# Patient Record
Sex: FEMALE | Race: BLACK OR AFRICAN AMERICAN | NOT HISPANIC OR LATINO
[De-identification: names, ages, dates, MRNs, and addresses within clinical notes are randomized per-mention and may not be internally consistent; named-entity substitution may affect disease eponyms.]

---

## 2017-01-02 ENCOUNTER — RESULT REVIEW (OUTPATIENT)
Age: 32
End: 2017-01-02

## 2017-01-03 ENCOUNTER — APPOINTMENT (OUTPATIENT)
Dept: HEMATOLOGY ONCOLOGY | Facility: CLINIC | Age: 32
End: 2017-01-03

## 2017-01-03 VITALS
DIASTOLIC BLOOD PRESSURE: 75 MMHG | SYSTOLIC BLOOD PRESSURE: 119 MMHG | WEIGHT: 187.39 LBS | BODY MASS INDEX: 33.62 KG/M2 | HEIGHT: 62.6 IN | OXYGEN SATURATION: 100 % | RESPIRATION RATE: 16 BRPM | TEMPERATURE: 98.3 F | HEART RATE: 98 BPM

## 2017-01-03 DIAGNOSIS — D47.3 ESSENTIAL (HEMORRHAGIC) THROMBOCYTHEMIA: ICD-10-CM

## 2017-01-25 ENCOUNTER — APPOINTMENT (OUTPATIENT)
Dept: OBGYN | Facility: CLINIC | Age: 32
End: 2017-01-25

## 2017-01-25 ENCOUNTER — APPOINTMENT (OUTPATIENT)
Dept: ANTEPARTUM | Facility: CLINIC | Age: 32
End: 2017-01-25

## 2017-01-25 ENCOUNTER — LABORATORY RESULT (OUTPATIENT)
Age: 32
End: 2017-01-25

## 2017-01-25 ENCOUNTER — ASOB RESULT (OUTPATIENT)
Age: 32
End: 2017-01-25

## 2017-01-25 VITALS
DIASTOLIC BLOOD PRESSURE: 70 MMHG | BODY MASS INDEX: 34.6 KG/M2 | SYSTOLIC BLOOD PRESSURE: 120 MMHG | HEIGHT: 62 IN | WEIGHT: 188 LBS

## 2017-02-01 ENCOUNTER — CHART COPY (OUTPATIENT)
Age: 32
End: 2017-02-01

## 2017-02-01 LAB — BACTERIA UR CULT: ABNORMAL

## 2017-02-02 ENCOUNTER — ASOB RESULT (OUTPATIENT)
Age: 32
End: 2017-02-02

## 2017-02-02 ENCOUNTER — APPOINTMENT (OUTPATIENT)
Dept: ANTEPARTUM | Facility: CLINIC | Age: 32
End: 2017-02-02

## 2017-02-08 ENCOUNTER — OUTPATIENT (OUTPATIENT)
Dept: OUTPATIENT SERVICES | Facility: HOSPITAL | Age: 32
LOS: 1 days | Discharge: HOME | End: 2017-02-08

## 2017-02-08 DIAGNOSIS — K08.499 PARTIAL LOSS OF TEETH DUE TO OTHER SPECIFIED CAUSE, UNSPECIFIED CLASS: Chronic | ICD-10-CM

## 2017-02-08 DIAGNOSIS — D47.3 ESSENTIAL (HEMORRHAGIC) THROMBOCYTHEMIA: ICD-10-CM

## 2017-02-09 ENCOUNTER — RESULT REVIEW (OUTPATIENT)
Age: 32
End: 2017-02-09

## 2017-02-10 ENCOUNTER — APPOINTMENT (OUTPATIENT)
Dept: HEMATOLOGY ONCOLOGY | Facility: CLINIC | Age: 32
End: 2017-02-10

## 2017-02-10 VITALS
TEMPERATURE: 98.3 F | SYSTOLIC BLOOD PRESSURE: 120 MMHG | BODY MASS INDEX: 34.88 KG/M2 | OXYGEN SATURATION: 100 % | RESPIRATION RATE: 16 BRPM | HEART RATE: 98 BPM | DIASTOLIC BLOOD PRESSURE: 76 MMHG | WEIGHT: 190.7 LBS

## 2017-02-10 LAB
BASOPHILS # BLD AUTO: 0.1 K/UL — SIGNIFICANT CHANGE UP (ref 0–0.2)
BASOPHILS NFR BLD AUTO: 0.7 % — SIGNIFICANT CHANGE UP (ref 0–2)
EOSINOPHIL # BLD AUTO: 0.1 K/UL — SIGNIFICANT CHANGE UP (ref 0–0.5)
EOSINOPHIL NFR BLD AUTO: 0.7 % — SIGNIFICANT CHANGE UP (ref 0–6)
HCT VFR BLD CALC: 32.5 % — LOW (ref 34.5–45)
HGB BLD-MCNC: 10.3 G/DL — LOW (ref 11.5–15.5)
LYMPHOCYTES # BLD AUTO: 28 % — SIGNIFICANT CHANGE UP (ref 13–44)
LYMPHOCYTES # BLD AUTO: 3.6 K/UL — HIGH (ref 1–3.3)
MCHC RBC-ENTMCNC: 21.9 PG — LOW (ref 27–34)
MCHC RBC-ENTMCNC: 31.7 G/DL — LOW (ref 32–36)
MCV RBC AUTO: 69.1 FL — LOW (ref 80–100)
MONOCYTES # BLD AUTO: 1.4 K/UL — HIGH (ref 0–0.9)
MONOCYTES NFR BLD AUTO: 10.8 % — SIGNIFICANT CHANGE UP (ref 2–14)
NEUTROPHILS # BLD AUTO: 7.7 K/UL — HIGH (ref 1.8–7.4)
NEUTROPHILS NFR BLD AUTO: 59.8 % — SIGNIFICANT CHANGE UP (ref 43–77)
PLATELET # BLD AUTO: 333 K/UL — SIGNIFICANT CHANGE UP (ref 150–400)
RBC # BLD: 4.71 M/UL — SIGNIFICANT CHANGE UP (ref 3.8–5.2)
RBC # FLD: 14.1 % — SIGNIFICANT CHANGE UP (ref 10.3–14.5)
WBC # BLD: 12.9 K/UL — HIGH (ref 3.8–10.5)
WBC # FLD AUTO: 12.9 K/UL — HIGH (ref 3.8–10.5)

## 2017-02-15 ENCOUNTER — APPOINTMENT (OUTPATIENT)
Dept: ENDOCRINOLOGY | Facility: CLINIC | Age: 32
End: 2017-02-15

## 2017-02-15 ENCOUNTER — LABORATORY RESULT (OUTPATIENT)
Age: 32
End: 2017-02-15

## 2017-02-15 ENCOUNTER — MEDICATION RENEWAL (OUTPATIENT)
Age: 32
End: 2017-02-15

## 2017-02-15 VITALS
HEIGHT: 62 IN | SYSTOLIC BLOOD PRESSURE: 102 MMHG | DIASTOLIC BLOOD PRESSURE: 64 MMHG | BODY MASS INDEX: 35 KG/M2 | WEIGHT: 190.2 LBS | OXYGEN SATURATION: 98 % | HEART RATE: 118 BPM

## 2017-02-15 PROBLEM — Z34.93 ENCOUNTER FOR SUPERVISION OF LOW-RISK PREGNANCY IN THIRD TRIMESTER: Status: RESOLVED | Noted: 2017-01-25 | Resolved: 2017-02-15

## 2017-02-15 LAB
FERRITIN SERPL-MCNC: 12.8 NG/ML
IRON SATN MFR SERPL: 11 %
IRON SERPL-MCNC: 48 UG/DL
TIBC SERPL-MCNC: 441 UG/DL
UIBC SERPL-MCNC: 393 UG/DL

## 2017-02-16 ENCOUNTER — APPOINTMENT (OUTPATIENT)
Dept: OBGYN | Facility: CLINIC | Age: 32
End: 2017-02-16

## 2017-02-16 VITALS
BODY MASS INDEX: 34.96 KG/M2 | SYSTOLIC BLOOD PRESSURE: 117 MMHG | HEIGHT: 62 IN | DIASTOLIC BLOOD PRESSURE: 71 MMHG | WEIGHT: 190 LBS

## 2017-02-16 DIAGNOSIS — Z34.93 ENCOUNTER FOR SUPERVISION OF NORMAL PREGNANCY, UNSPECIFIED, THIRD TRIMESTER: ICD-10-CM

## 2017-02-16 DIAGNOSIS — O20.0 THREATENED ABORTION: ICD-10-CM

## 2017-02-17 LAB
BASOPHILS # BLD AUTO: 0 K/UL
BASOPHILS NFR BLD AUTO: 0 %
EOSINOPHIL # BLD AUTO: 0.12 K/UL
EOSINOPHIL NFR BLD AUTO: 0.9 %
GLUCOSE 1H P 50 G GLC PO SERPL-MCNC: 98 MG/DL
HCT VFR BLD CALC: 30.7 %
HGB BLD-MCNC: 9.8 G/DL
HIV1+2 AB SPEC QL IA.RAPID: NONREACTIVE
HIVABINT: NORMAL
LYMPHOCYTES # BLD AUTO: 1.53 K/UL
LYMPHOCYTES NFR BLD AUTO: 11.8 %
MAN DIFF?: NORMAL
MCHC RBC-ENTMCNC: 21.4 PG
MCHC RBC-ENTMCNC: 31.9 GM/DL
MCV RBC AUTO: 67.2 FL
MONOCYTES # BLD AUTO: 1.06 K/UL
MONOCYTES NFR BLD AUTO: 8.2 %
NEUTROPHILS # BLD AUTO: 9.92 K/UL
NEUTROPHILS NFR BLD AUTO: 75.5 %
PLATELET # BLD AUTO: 360 K/UL
RBC # BLD: 4.57 M/UL
RBC # FLD: 15.6 %
WBC # FLD AUTO: 12.98 K/UL

## 2017-02-23 ENCOUNTER — OUTPATIENT (OUTPATIENT)
Dept: OUTPATIENT SERVICES | Facility: HOSPITAL | Age: 32
LOS: 1 days | End: 2017-02-23
Payer: COMMERCIAL

## 2017-02-23 ENCOUNTER — EMERGENCY (EMERGENCY)
Facility: HOSPITAL | Age: 32
LOS: 1 days | Discharge: HOME | End: 2017-02-23
Attending: EMERGENCY MEDICINE | Admitting: EMERGENCY MEDICINE
Payer: COMMERCIAL

## 2017-02-23 VITALS
DIASTOLIC BLOOD PRESSURE: 80 MMHG | TEMPERATURE: 98 F | RESPIRATION RATE: 20 BRPM | SYSTOLIC BLOOD PRESSURE: 130 MMHG | HEART RATE: 109 BPM | OXYGEN SATURATION: 98 %

## 2017-02-23 VITALS
HEART RATE: 96 BPM | OXYGEN SATURATION: 99 % | SYSTOLIC BLOOD PRESSURE: 114 MMHG | DIASTOLIC BLOOD PRESSURE: 67 MMHG | TEMPERATURE: 99 F | RESPIRATION RATE: 18 BRPM

## 2017-02-23 DIAGNOSIS — K08.499 PARTIAL LOSS OF TEETH DUE TO OTHER SPECIFIED CAUSE, UNSPECIFIED CLASS: Chronic | ICD-10-CM

## 2017-02-23 DIAGNOSIS — O47.1 FALSE LABOR AT OR AFTER 37 COMPLETED WEEKS OF GESTATION: ICD-10-CM

## 2017-02-23 DIAGNOSIS — O12.02 GESTATIONAL EDEMA, SECOND TRIMESTER: ICD-10-CM

## 2017-02-23 LAB
ALBUMIN SERPL ELPH-MCNC: 3.7 G/DL — SIGNIFICANT CHANGE UP (ref 3.3–5)
ALP SERPL-CCNC: 62 U/L — SIGNIFICANT CHANGE UP (ref 40–120)
ALT FLD-CCNC: 24 U/L RC — SIGNIFICANT CHANGE UP (ref 10–45)
ANION GAP SERPL CALC-SCNC: 14 MMOL/L — SIGNIFICANT CHANGE UP (ref 5–17)
APPEARANCE UR: ABNORMAL
APTT BLD: 28.3 SEC — SIGNIFICANT CHANGE UP (ref 27.5–37.4)
AST SERPL-CCNC: 44 U/L — HIGH (ref 10–40)
BACTERIA # UR AUTO: ABNORMAL /HPF
BASOPHILS # BLD AUTO: 0 K/UL — SIGNIFICANT CHANGE UP (ref 0–0.2)
BASOPHILS NFR BLD AUTO: 0.4 % — SIGNIFICANT CHANGE UP (ref 0–2)
BILIRUB SERPL-MCNC: 0.3 MG/DL — SIGNIFICANT CHANGE UP (ref 0.2–1.2)
BILIRUB UR-MCNC: NEGATIVE — SIGNIFICANT CHANGE UP
BUN SERPL-MCNC: 5 MG/DL — LOW (ref 7–23)
CALCIUM SERPL-MCNC: 9.1 MG/DL — SIGNIFICANT CHANGE UP (ref 8.4–10.5)
CHLORIDE SERPL-SCNC: 101 MMOL/L — SIGNIFICANT CHANGE UP (ref 96–108)
CO2 SERPL-SCNC: 23 MMOL/L — SIGNIFICANT CHANGE UP (ref 22–31)
COLOR SPEC: YELLOW — SIGNIFICANT CHANGE UP
CREAT SERPL-MCNC: 0.49 MG/DL — LOW (ref 0.5–1.3)
DIFF PNL FLD: NEGATIVE — SIGNIFICANT CHANGE UP
EOSINOPHIL # BLD AUTO: 0.1 K/UL — SIGNIFICANT CHANGE UP (ref 0–0.5)
EOSINOPHIL NFR BLD AUTO: 0.5 % — SIGNIFICANT CHANGE UP (ref 0–6)
EPI CELLS # UR: ABNORMAL /HPF
FSP PPP-MCNC: < 5 — SIGNIFICANT CHANGE UP
GLUCOSE SERPL-MCNC: 69 MG/DL — LOW (ref 70–99)
GLUCOSE UR QL: NEGATIVE — SIGNIFICANT CHANGE UP
HCT VFR BLD CALC: 32.4 % — LOW (ref 34.5–45)
HGB BLD-MCNC: 10.4 G/DL — LOW (ref 11.5–15.5)
INR BLD: 1.09 RATIO — SIGNIFICANT CHANGE UP (ref 0.88–1.16)
KETONES UR-MCNC: NEGATIVE — SIGNIFICANT CHANGE UP
LDH SERPL L TO P-CCNC: 302 U/L — HIGH (ref 50–242)
LEUKOCYTE ESTERASE UR-ACNC: NEGATIVE — SIGNIFICANT CHANGE UP
LYMPHOCYTES # BLD AUTO: 22.3 % — SIGNIFICANT CHANGE UP (ref 13–44)
LYMPHOCYTES # BLD AUTO: 3 K/UL — SIGNIFICANT CHANGE UP (ref 1–3.3)
MCHC RBC-ENTMCNC: 22.1 PG — LOW (ref 27–34)
MCHC RBC-ENTMCNC: 32 GM/DL — SIGNIFICANT CHANGE UP (ref 32–36)
MCV RBC AUTO: 68.9 FL — LOW (ref 80–100)
MONOCYTES # BLD AUTO: 1.2 K/UL — HIGH (ref 0–0.9)
MONOCYTES NFR BLD AUTO: 9 % — SIGNIFICANT CHANGE UP (ref 2–14)
NEUTROPHILS # BLD AUTO: 9.1 K/UL — HIGH (ref 1.8–7.4)
NEUTROPHILS NFR BLD AUTO: 67.8 % — SIGNIFICANT CHANGE UP (ref 43–77)
NITRITE UR-MCNC: NEGATIVE — SIGNIFICANT CHANGE UP
PH UR: 6.5 — SIGNIFICANT CHANGE UP (ref 4.8–8)
PLATELET # BLD AUTO: 338 K/UL — SIGNIFICANT CHANGE UP (ref 150–400)
POTASSIUM SERPL-MCNC: 4.6 MMOL/L — SIGNIFICANT CHANGE UP (ref 3.5–5.3)
POTASSIUM SERPL-SCNC: 4.6 MMOL/L — SIGNIFICANT CHANGE UP (ref 3.5–5.3)
PROT SERPL-MCNC: 7.2 G/DL — SIGNIFICANT CHANGE UP (ref 6–8.3)
PROT UR-MCNC: SIGNIFICANT CHANGE UP
PROTHROM AB SERPL-ACNC: 11.8 SEC — SIGNIFICANT CHANGE UP (ref 10–13.1)
RBC # BLD: 4.7 M/UL — SIGNIFICANT CHANGE UP (ref 3.8–5.2)
RBC # FLD: 15 % — HIGH (ref 10.3–14.5)
RBC CASTS # UR COMP ASSIST: SIGNIFICANT CHANGE UP /HPF (ref 0–2)
SODIUM SERPL-SCNC: 138 MMOL/L — SIGNIFICANT CHANGE UP (ref 135–145)
SP GR SPEC: 1.01 — SIGNIFICANT CHANGE UP (ref 1.01–1.02)
URATE SERPL-MCNC: 3.7 MG/DL — SIGNIFICANT CHANGE UP (ref 2.5–7)
UROBILINOGEN FLD QL: NEGATIVE — SIGNIFICANT CHANGE UP
WBC # BLD: 13.4 K/UL — HIGH (ref 3.8–10.5)
WBC # FLD AUTO: 13.4 K/UL — HIGH (ref 3.8–10.5)
WBC UR QL: SIGNIFICANT CHANGE UP /HPF (ref 0–5)

## 2017-02-23 PROCEDURE — 80053 COMPREHEN METABOLIC PANEL: CPT

## 2017-02-23 PROCEDURE — 85384 FIBRINOGEN ACTIVITY: CPT

## 2017-02-23 PROCEDURE — 83615 LACTATE (LD) (LDH) ENZYME: CPT

## 2017-02-23 PROCEDURE — 59025 FETAL NON-STRESS TEST: CPT | Mod: 26

## 2017-02-23 PROCEDURE — 85610 PROTHROMBIN TIME: CPT

## 2017-02-23 PROCEDURE — 85027 COMPLETE CBC AUTOMATED: CPT

## 2017-02-23 PROCEDURE — 85362 FIBRIN DEGRADATION PRODUCTS: CPT

## 2017-02-23 PROCEDURE — 84550 ASSAY OF BLOOD/URIC ACID: CPT

## 2017-02-23 PROCEDURE — 99283 EMERGENCY DEPT VISIT LOW MDM: CPT

## 2017-02-23 PROCEDURE — 81001 URINALYSIS AUTO W/SCOPE: CPT

## 2017-02-23 PROCEDURE — 99284 EMERGENCY DEPT VISIT MOD MDM: CPT

## 2017-02-23 PROCEDURE — 85730 THROMBOPLASTIN TIME PARTIAL: CPT

## 2017-02-23 PROCEDURE — G0463: CPT

## 2017-02-23 NOTE — ED ADULT NURSE NOTE - ADDITIONAL PRINTED INSTRUCTIONS GIVEN
Report given to labor and delivery RN Arcelia. One of the nurses in labor and delivery siaid to leave the IV in case they need it.

## 2017-02-23 NOTE — ED PROVIDER NOTE - MUSCULOSKELETAL, MLM
Spine appears normal, range of motion is not limited, no muscle or joint tenderness. No pitting edema noted.

## 2017-02-23 NOTE — ED PROVIDER NOTE - OBJECTIVE STATEMENT
31 year old pregnant female (, 25 weeks) presents with b/l pedal edema since last night. Pt called her OB today and was advised to present to the ED. Endorses HA yesterday and Monday described as 6/10 in severity. Denies any HA currently. Denies abdominal pain, vaginal bleeding, CP, palpitations, SOB. No recent travel or extended periods of immobilizations. Reports family hx of HTN.    OB: Dr. Mcguire       OB: Dr. Mcguire

## 2017-02-23 NOTE — ED PROVIDER NOTE - NS ED MD SCRIBE ATTENDING SCRIBE SECTIONS
INTAKE ASSESSMENT/SCREENINGS/PAST MEDICAL/SURGICAL/SOCIAL HISTORY/PHYSICAL EXAM/HIV/VITAL SIGNS( Pullset)/HISTORY OF PRESENT ILLNESS/REVIEW OF SYSTEMS

## 2017-02-23 NOTE — ED ADULT NURSE NOTE - OBJECTIVE STATEMENT
Received pt alert and orientedX4. No distress. Breathing easy and non labored. Pt c/o swelling non pitting edema of lower extremities. no other complaints. Pt stated no other complications from pregnancy which is the first pregnancy for pt. Mildly anxious. Emotional support offered.  at bedside.

## 2017-02-23 NOTE — ED PROVIDER NOTE - PROGRESS NOTE DETAILS
GYN resident aware, will speak w GYN attendnig D/w GYN resident, will monitor upstairs in L&D, will f/u on labs and VS for possible preeclampsia

## 2017-03-01 LAB
CREAT 24H UR-MCNC: 1.2 G/24 H
CREAT 24H UR-MCNC: 1.2 G/24 H
CREAT ?TM UR-MCNC: 83 MG/DL
CREAT ?TM UR-MCNC: 83 MG/DL
PROT 24H UR-MRATE: 7 MG/DL
PROT ?TM UR-MCNC: 24 HR
PROT UR-MCNC: 105 MG/24 H
SPECIMEN VOL 24H UR: 1500 ML

## 2017-03-15 ENCOUNTER — APPOINTMENT (OUTPATIENT)
Dept: ANTEPARTUM | Facility: CLINIC | Age: 32
End: 2017-03-15

## 2017-03-15 ENCOUNTER — ASOB RESULT (OUTPATIENT)
Age: 32
End: 2017-03-15

## 2017-03-15 ENCOUNTER — APPOINTMENT (OUTPATIENT)
Dept: OBGYN | Facility: CLINIC | Age: 32
End: 2017-03-15

## 2017-03-15 ENCOUNTER — LABORATORY RESULT (OUTPATIENT)
Age: 32
End: 2017-03-15

## 2017-03-15 VITALS
SYSTOLIC BLOOD PRESSURE: 138 MMHG | WEIGHT: 191 LBS | HEIGHT: 62 IN | DIASTOLIC BLOOD PRESSURE: 81 MMHG | BODY MASS INDEX: 35.15 KG/M2

## 2017-03-16 LAB
ALBUMIN SERPL ELPH-MCNC: 3.4 G/DL
ALP BLD-CCNC: 81 U/L
ALT SERPL-CCNC: 24 U/L
ANION GAP SERPL CALC-SCNC: 16 MMOL/L
AST SERPL-CCNC: 21 U/L
BASOPHILS # BLD AUTO: 0 K/UL
BASOPHILS NFR BLD AUTO: 0 %
BILIRUB SERPL-MCNC: 0.2 MG/DL
BUN SERPL-MCNC: 3 MG/DL
CALCIUM SERPL-MCNC: 9.9 MG/DL
CHLORIDE SERPL-SCNC: 104 MMOL/L
CO2 SERPL-SCNC: 19 MMOL/L
CREAT SERPL-MCNC: 0.42 MG/DL
CREAT SPEC-SCNC: 63 MG/DL
CREAT/PROT UR: 0.1 RATIO
EOSINOPHIL # BLD AUTO: 0.11 K/UL
EOSINOPHIL NFR BLD AUTO: 0.9 %
GLUCOSE SERPL-MCNC: 92 MG/DL
HCT VFR BLD CALC: 31.7 %
HGB BLD-MCNC: 10.1 G/DL
LYMPHOCYTES # BLD AUTO: 1.54 K/UL
LYMPHOCYTES NFR BLD AUTO: 12.6 %
MAN DIFF?: NORMAL
MCHC RBC-ENTMCNC: 21.5 PG
MCHC RBC-ENTMCNC: 31.9 GM/DL
MCV RBC AUTO: 67.4 FL
MONOCYTES # BLD AUTO: 0.88 K/UL
MONOCYTES NFR BLD AUTO: 7.2 %
NEUTROPHILS # BLD AUTO: 9.34 K/UL
NEUTROPHILS NFR BLD AUTO: 76.6 %
PLATELET # BLD AUTO: 377 K/UL
POTASSIUM SERPL-SCNC: 4.3 MMOL/L
PROT SERPL-MCNC: 6.3 G/DL
PROT UR-MCNC: 8 MG/DL
RBC # BLD: 4.7 M/UL
RBC # FLD: 15.6 %
SODIUM SERPL-SCNC: 139 MMOL/L
WBC # FLD AUTO: 12.19 K/UL

## 2017-03-17 LAB — BACTERIA UR CULT: NORMAL

## 2017-04-05 ENCOUNTER — APPOINTMENT (OUTPATIENT)
Dept: OBGYN | Facility: CLINIC | Age: 32
End: 2017-04-05

## 2017-04-11 ENCOUNTER — APPOINTMENT (OUTPATIENT)
Dept: OBGYN | Facility: CLINIC | Age: 32
End: 2017-04-11

## 2017-04-11 VITALS
DIASTOLIC BLOOD PRESSURE: 81 MMHG | SYSTOLIC BLOOD PRESSURE: 135 MMHG | HEIGHT: 62 IN | WEIGHT: 191 LBS | BODY MASS INDEX: 35.15 KG/M2

## 2017-04-11 RX ORDER — NITROFURANTOIN MACROCRYSTALS 100 MG/1
100 CAPSULE ORAL
Qty: 10 | Refills: 0 | Status: COMPLETED | COMMUNITY
Start: 2017-02-01 | End: 2017-04-11

## 2017-04-26 ENCOUNTER — APPOINTMENT (OUTPATIENT)
Dept: ANTEPARTUM | Facility: CLINIC | Age: 32
End: 2017-04-26

## 2017-04-26 ENCOUNTER — ASOB RESULT (OUTPATIENT)
Age: 32
End: 2017-04-26

## 2017-04-26 ENCOUNTER — APPOINTMENT (OUTPATIENT)
Dept: OBGYN | Facility: CLINIC | Age: 32
End: 2017-04-26

## 2017-04-26 VITALS
DIASTOLIC BLOOD PRESSURE: 75 MMHG | BODY MASS INDEX: 35.7 KG/M2 | HEIGHT: 62 IN | SYSTOLIC BLOOD PRESSURE: 125 MMHG | WEIGHT: 194 LBS

## 2017-05-10 ENCOUNTER — APPOINTMENT (OUTPATIENT)
Dept: OBGYN | Facility: CLINIC | Age: 32
End: 2017-05-10

## 2017-05-10 VITALS
BODY MASS INDEX: 35.51 KG/M2 | SYSTOLIC BLOOD PRESSURE: 112 MMHG | HEIGHT: 62 IN | DIASTOLIC BLOOD PRESSURE: 75 MMHG | WEIGHT: 193 LBS

## 2017-05-11 LAB
GP B STREP DNA SPEC QL NAA+PROBE: NORMAL
GP B STREP DNA SPEC QL NAA+PROBE: NOT DETECTED
SOURCE GBS: NORMAL

## 2017-05-17 PROBLEM — Z34.02 ENCOUNTER FOR SUPERVISION OF NORMAL FIRST PREGNANCY IN SECOND TRIMESTER: Status: RESOLVED | Noted: 2017-01-25 | Resolved: 2017-05-17

## 2017-05-18 ENCOUNTER — APPOINTMENT (OUTPATIENT)
Dept: ANTEPARTUM | Facility: CLINIC | Age: 32
End: 2017-05-18

## 2017-05-18 ENCOUNTER — APPOINTMENT (OUTPATIENT)
Dept: OBGYN | Facility: CLINIC | Age: 32
End: 2017-05-18

## 2017-05-18 ENCOUNTER — ASOB RESULT (OUTPATIENT)
Age: 32
End: 2017-05-18

## 2017-05-18 VITALS — WEIGHT: 191 LBS | DIASTOLIC BLOOD PRESSURE: 80 MMHG | SYSTOLIC BLOOD PRESSURE: 124 MMHG | BODY MASS INDEX: 34.93 KG/M2

## 2017-05-18 DIAGNOSIS — Z34.02 ENCOUNTER FOR SUPERVISION OF NORMAL FIRST PREGNANCY, SECOND TRIMESTER: ICD-10-CM

## 2017-05-18 DIAGNOSIS — Z34.01 ENCOUNTER FOR SUPERVISION OF NORMAL FIRST PREGNANCY, FIRST TRIMESTER: ICD-10-CM

## 2017-05-25 ENCOUNTER — APPOINTMENT (OUTPATIENT)
Dept: OBGYN | Facility: CLINIC | Age: 32
End: 2017-05-25

## 2017-05-25 VITALS
WEIGHT: 192 LBS | BODY MASS INDEX: 35.33 KG/M2 | DIASTOLIC BLOOD PRESSURE: 72 MMHG | HEIGHT: 62 IN | SYSTOLIC BLOOD PRESSURE: 130 MMHG

## 2017-06-01 ENCOUNTER — APPOINTMENT (OUTPATIENT)
Dept: OBGYN | Facility: CLINIC | Age: 32
End: 2017-06-01

## 2017-06-01 VITALS
HEIGHT: 62 IN | BODY MASS INDEX: 36.32 KG/M2 | WEIGHT: 197.38 LBS | DIASTOLIC BLOOD PRESSURE: 70 MMHG | SYSTOLIC BLOOD PRESSURE: 120 MMHG

## 2017-06-08 ENCOUNTER — INPATIENT (INPATIENT)
Facility: HOSPITAL | Age: 32
LOS: 2 days | Discharge: HOME | End: 2017-06-11
Attending: OBSTETRICS & GYNECOLOGY | Admitting: OBSTETRICS & GYNECOLOGY
Payer: COMMERCIAL

## 2017-06-08 ENCOUNTER — RESULT REVIEW (OUTPATIENT)
Age: 32
End: 2017-06-08

## 2017-06-08 ENCOUNTER — APPOINTMENT (OUTPATIENT)
Dept: ANTEPARTUM | Facility: CLINIC | Age: 32
End: 2017-06-08

## 2017-06-08 ENCOUNTER — TRANSCRIPTION ENCOUNTER (OUTPATIENT)
Age: 32
End: 2017-06-08

## 2017-06-08 ENCOUNTER — ASOB RESULT (OUTPATIENT)
Age: 32
End: 2017-06-08

## 2017-06-08 ENCOUNTER — APPOINTMENT (OUTPATIENT)
Dept: OBGYN | Facility: CLINIC | Age: 32
End: 2017-06-08

## 2017-06-08 VITALS
SYSTOLIC BLOOD PRESSURE: 125 MMHG | RESPIRATION RATE: 18 BRPM | DIASTOLIC BLOOD PRESSURE: 82 MMHG | TEMPERATURE: 99 F | OXYGEN SATURATION: 92 % | HEART RATE: 108 BPM

## 2017-06-08 VITALS — BODY MASS INDEX: 35.67 KG/M2 | DIASTOLIC BLOOD PRESSURE: 60 MMHG | WEIGHT: 195 LBS | SYSTOLIC BLOOD PRESSURE: 120 MMHG

## 2017-06-08 DIAGNOSIS — Z3A.00 WEEKS OF GESTATION OF PREGNANCY NOT SPECIFIED: ICD-10-CM

## 2017-06-08 DIAGNOSIS — O47.1 FALSE LABOR AT OR AFTER 37 COMPLETED WEEKS OF GESTATION: ICD-10-CM

## 2017-06-08 DIAGNOSIS — O26.899 OTHER SPECIFIED PREGNANCY RELATED CONDITIONS, UNSPECIFIED TRIMESTER: ICD-10-CM

## 2017-06-08 DIAGNOSIS — K08.499 PARTIAL LOSS OF TEETH DUE TO OTHER SPECIFIED CAUSE, UNSPECIFIED CLASS: Chronic | ICD-10-CM

## 2017-06-08 DIAGNOSIS — D56.3 THALASSEMIA MINOR: ICD-10-CM

## 2017-06-08 DIAGNOSIS — Z34.80 ENCOUNTER FOR SUPERVISION OF OTHER NORMAL PREGNANCY, UNSPECIFIED TRIMESTER: ICD-10-CM

## 2017-06-08 DIAGNOSIS — R76.12 NONSPECIFIC REACTION TO CELL MEDIATED IMMUNITY MEASUREMENT OF GAMMA INTERFERON ANTIGEN RESPONSE W/OUT ACTIVE TUBERCULOSIS: ICD-10-CM

## 2017-06-08 LAB
BASOPHILS # BLD AUTO: 0 K/UL — SIGNIFICANT CHANGE UP (ref 0–0.2)
BASOPHILS NFR BLD AUTO: 0.5 % — SIGNIFICANT CHANGE UP (ref 0–2)
BLD GP AB SCN SERPL QL: NEGATIVE — SIGNIFICANT CHANGE UP
EOSINOPHIL # BLD AUTO: 0.1 K/UL — SIGNIFICANT CHANGE UP (ref 0–0.5)
EOSINOPHIL NFR BLD AUTO: 0.7 % — SIGNIFICANT CHANGE UP (ref 0–6)
HCT VFR BLD CALC: 37.6 % — SIGNIFICANT CHANGE UP (ref 34.5–45)
HGB BLD-MCNC: 12.2 G/DL — SIGNIFICANT CHANGE UP (ref 11.5–15.5)
LYMPHOCYTES # BLD AUTO: 2.4 K/UL — SIGNIFICANT CHANGE UP (ref 1–3.3)
LYMPHOCYTES # BLD AUTO: 22.7 % — SIGNIFICANT CHANGE UP (ref 13–44)
MCHC RBC-ENTMCNC: 22.9 PG — LOW (ref 27–34)
MCHC RBC-ENTMCNC: 32.5 GM/DL — SIGNIFICANT CHANGE UP (ref 32–36)
MCV RBC AUTO: 70.5 FL — LOW (ref 80–100)
MONOCYTES # BLD AUTO: 0.9 K/UL — SIGNIFICANT CHANGE UP (ref 0–0.9)
MONOCYTES NFR BLD AUTO: 8.9 % — SIGNIFICANT CHANGE UP (ref 2–14)
NEUTROPHILS # BLD AUTO: 7.1 K/UL — SIGNIFICANT CHANGE UP (ref 1.8–7.4)
NEUTROPHILS NFR BLD AUTO: 67.2 % — SIGNIFICANT CHANGE UP (ref 43–77)
PLATELET # BLD AUTO: 325 K/UL — SIGNIFICANT CHANGE UP (ref 150–400)
RBC # BLD: 5.34 M/UL — HIGH (ref 3.8–5.2)
RBC # FLD: 14.8 % — HIGH (ref 10.3–14.5)
RH IG SCN BLD-IMP: POSITIVE — SIGNIFICANT CHANGE UP
WBC # BLD: 10.6 K/UL — HIGH (ref 3.8–10.5)
WBC # FLD AUTO: 10.6 K/UL — HIGH (ref 3.8–10.5)

## 2017-06-08 PROCEDURE — 59510 CESAREAN DELIVERY: CPT

## 2017-06-08 PROCEDURE — 88307 TISSUE EXAM BY PATHOLOGIST: CPT | Mod: 26

## 2017-06-08 PROCEDURE — 93010 ELECTROCARDIOGRAM REPORT: CPT

## 2017-06-08 RX ORDER — OXYCODONE HYDROCHLORIDE 5 MG/1
5 TABLET ORAL
Qty: 0 | Refills: 0 | Status: COMPLETED | OUTPATIENT
Start: 2017-06-10 | End: 2017-06-17

## 2017-06-08 RX ORDER — CITRIC ACID/SODIUM CITRATE 300-500 MG
15 SOLUTION, ORAL ORAL EVERY 4 HOURS
Qty: 0 | Refills: 0 | Status: DISCONTINUED | OUTPATIENT
Start: 2017-06-08 | End: 2017-06-08

## 2017-06-08 RX ORDER — SODIUM CHLORIDE 9 MG/ML
1000 INJECTION, SOLUTION INTRAVENOUS
Qty: 0 | Refills: 0 | Status: DISCONTINUED | OUTPATIENT
Start: 2017-06-08 | End: 2017-06-08

## 2017-06-08 RX ORDER — ONDANSETRON 8 MG/1
4 TABLET, FILM COATED ORAL EVERY 6 HOURS
Qty: 0 | Refills: 0 | Status: DISCONTINUED | OUTPATIENT
Start: 2017-06-08 | End: 2017-06-10

## 2017-06-08 RX ORDER — OXYTOCIN 10 UNIT/ML
41.67 VIAL (ML) INJECTION
Qty: 20 | Refills: 0 | Status: DISCONTINUED | OUTPATIENT
Start: 2017-06-09 | End: 2017-06-11

## 2017-06-08 RX ORDER — GLYCERIN ADULT
1 SUPPOSITORY, RECTAL RECTAL AT BEDTIME
Qty: 0 | Refills: 0 | Status: DISCONTINUED | OUTPATIENT
Start: 2017-06-09 | End: 2017-06-11

## 2017-06-08 RX ORDER — KETOROLAC TROMETHAMINE 30 MG/ML
30 SYRINGE (ML) INJECTION EVERY 6 HOURS
Qty: 0 | Refills: 0 | Status: DISCONTINUED | OUTPATIENT
Start: 2017-06-08 | End: 2017-06-10

## 2017-06-08 RX ORDER — NALOXONE HYDROCHLORIDE 4 MG/.1ML
0.1 SPRAY NASAL
Qty: 0 | Refills: 0 | Status: DISCONTINUED | OUTPATIENT
Start: 2017-06-08 | End: 2017-06-10

## 2017-06-08 RX ORDER — DEXAMETHASONE 0.5 MG/5ML
4 ELIXIR ORAL EVERY 6 HOURS
Qty: 0 | Refills: 0 | Status: DISCONTINUED | OUTPATIENT
Start: 2017-06-08 | End: 2017-06-10

## 2017-06-08 RX ORDER — ACETAMINOPHEN 500 MG
975 TABLET ORAL EVERY 6 HOURS
Qty: 0 | Refills: 0 | Status: COMPLETED | OUTPATIENT
Start: 2017-06-09 | End: 2018-05-08

## 2017-06-08 RX ORDER — TETANUS TOXOID, REDUCED DIPHTHERIA TOXOID AND ACELLULAR PERTUSSIS VACCINE, ADSORBED 5; 2.5; 8; 8; 2.5 [IU]/.5ML; [IU]/.5ML; UG/.5ML; UG/.5ML; UG/.5ML
0.5 SUSPENSION INTRAMUSCULAR ONCE
Qty: 0 | Refills: 0 | Status: DISCONTINUED | OUTPATIENT
Start: 2017-06-09 | End: 2017-06-11

## 2017-06-08 RX ORDER — LANOLIN
1 OINTMENT (GRAM) TOPICAL
Qty: 0 | Refills: 0 | Status: DISCONTINUED | OUTPATIENT
Start: 2017-06-09 | End: 2017-06-11

## 2017-06-08 RX ORDER — SIMETHICONE 80 MG/1
80 TABLET, CHEWABLE ORAL EVERY 4 HOURS
Qty: 0 | Refills: 0 | Status: DISCONTINUED | OUTPATIENT
Start: 2017-06-09 | End: 2017-06-11

## 2017-06-08 RX ORDER — IBUPROFEN 200 MG
600 TABLET ORAL EVERY 6 HOURS
Qty: 0 | Refills: 0 | Status: DISCONTINUED | OUTPATIENT
Start: 2017-06-10 | End: 2017-06-10

## 2017-06-08 RX ORDER — FAMOTIDINE 10 MG/ML
20 INJECTION INTRAVENOUS ONCE
Qty: 0 | Refills: 0 | Status: COMPLETED | OUTPATIENT
Start: 2017-06-08 | End: 2017-06-08

## 2017-06-08 RX ORDER — DIPHENHYDRAMINE HCL 50 MG
25 CAPSULE ORAL EVERY 6 HOURS
Qty: 0 | Refills: 0 | Status: DISCONTINUED | OUTPATIENT
Start: 2017-06-09 | End: 2017-06-11

## 2017-06-08 RX ORDER — HEPARIN SODIUM 5000 [USP'U]/ML
5000 INJECTION INTRAVENOUS; SUBCUTANEOUS EVERY 12 HOURS
Qty: 0 | Refills: 0 | Status: DISCONTINUED | OUTPATIENT
Start: 2017-06-09 | End: 2017-06-11

## 2017-06-08 RX ORDER — OXYTOCIN 10 UNIT/ML
333.33 VIAL (ML) INJECTION
Qty: 20 | Refills: 0 | Status: DISCONTINUED | OUTPATIENT
Start: 2017-06-08 | End: 2017-06-08

## 2017-06-08 RX ORDER — OXYCODONE HYDROCHLORIDE 5 MG/1
5 TABLET ORAL EVERY 4 HOURS
Qty: 0 | Refills: 0 | Status: DISCONTINUED | OUTPATIENT
Start: 2017-06-10 | End: 2017-06-11

## 2017-06-08 RX ORDER — SODIUM CHLORIDE 9 MG/ML
1000 INJECTION, SOLUTION INTRAVENOUS
Qty: 0 | Refills: 0 | Status: DISCONTINUED | OUTPATIENT
Start: 2017-06-09 | End: 2017-06-11

## 2017-06-08 RX ORDER — SODIUM CHLORIDE 9 MG/ML
500 INJECTION, SOLUTION INTRAVENOUS ONCE
Qty: 0 | Refills: 0 | Status: DISCONTINUED | OUTPATIENT
Start: 2017-06-08 | End: 2017-06-08

## 2017-06-08 RX ORDER — METOCLOPRAMIDE HCL 10 MG
10 TABLET ORAL ONCE
Qty: 0 | Refills: 0 | Status: DISCONTINUED | OUTPATIENT
Start: 2017-06-08 | End: 2017-06-11

## 2017-06-08 RX ORDER — OXYTOCIN 10 UNIT/ML
41.67 VIAL (ML) INJECTION
Qty: 20 | Refills: 0 | Status: DISCONTINUED | OUTPATIENT
Start: 2017-06-08 | End: 2017-06-11

## 2017-06-08 RX ADMIN — Medication 30 MILLIGRAM(S): at 19:20

## 2017-06-08 RX ADMIN — FAMOTIDINE 20 MILLIGRAM(S): 10 INJECTION INTRAVENOUS at 14:51

## 2017-06-08 RX ADMIN — Medication 15 MILLILITER(S): at 14:52

## 2017-06-08 NOTE — PROVIDER CONTACT NOTE (OTHER) - ACTION/TREATMENT ORDERED:
MD mcdaniel and dr. hebert at bedside @ 2043; dr hebert performing bimanual exam; as per dr. hebert, bous pitocin and perform EKG, followed by pt to be transferred to Postpartum unit as per dr hebert.
continue to monitor at this time as per MD Cornejo, no further action needed at this time as per dr. cornejo.

## 2017-06-08 NOTE — PROVIDER CONTACT NOTE (OTHER) - ASSESSMENT
handful of grape sized clots expressed; fundus firm, light to moderate bleeding noted. bp 137/68, pulse 116, o2 sat 985, 22bpm resp. pt states 5/10 pain and "it has gotten better since toradol."
pt states pain 8/10 in incision area; toradol 30mg IVP given @ 1922. pulse 114; bp 139/71; resp 21 bpm, o2 sat 98%; fundus firm, midline, light bleeding noted.

## 2017-06-09 LAB
HCT VFR BLD CALC: 28.3 % — LOW (ref 34.5–45)
HGB BLD-MCNC: 9 G/DL — LOW (ref 11.5–15.5)
MCHC RBC-ENTMCNC: 22.5 PG — LOW (ref 27–34)
MCHC RBC-ENTMCNC: 31.7 GM/DL — LOW (ref 32–36)
MCV RBC AUTO: 71 FL — LOW (ref 80–100)
PLATELET # BLD AUTO: 264 K/UL — SIGNIFICANT CHANGE UP (ref 150–400)
RBC # BLD: 3.99 M/UL — SIGNIFICANT CHANGE UP (ref 3.8–5.2)
RBC # FLD: 14.5 % — SIGNIFICANT CHANGE UP (ref 10.3–14.5)
T PALLIDUM AB TITR SER: NEGATIVE — SIGNIFICANT CHANGE UP
WBC # BLD: 15.8 K/UL — HIGH (ref 3.8–10.5)
WBC # FLD AUTO: 15.8 K/UL — HIGH (ref 3.8–10.5)

## 2017-06-09 RX ORDER — DOCUSATE SODIUM 100 MG
100 CAPSULE ORAL THREE TIMES A DAY
Qty: 0 | Refills: 0 | Status: DISCONTINUED | OUTPATIENT
Start: 2017-06-09 | End: 2017-06-11

## 2017-06-09 RX ORDER — DOCUSATE SODIUM 100 MG
100 CAPSULE ORAL
Qty: 0 | Refills: 0 | Status: DISCONTINUED | OUTPATIENT
Start: 2017-06-09 | End: 2017-06-09

## 2017-06-09 RX ORDER — FERROUS SULFATE 325(65) MG
325 TABLET ORAL
Qty: 0 | Refills: 0 | Status: DISCONTINUED | OUTPATIENT
Start: 2017-06-09 | End: 2017-06-11

## 2017-06-09 RX ORDER — FERROUS SULFATE 325(65) MG
325 TABLET ORAL DAILY
Qty: 0 | Refills: 0 | Status: DISCONTINUED | OUTPATIENT
Start: 2017-06-09 | End: 2017-06-09

## 2017-06-09 RX ORDER — ASCORBIC ACID 60 MG
250 TABLET,CHEWABLE ORAL THREE TIMES A DAY
Qty: 0 | Refills: 0 | Status: DISCONTINUED | OUTPATIENT
Start: 2017-06-09 | End: 2017-06-11

## 2017-06-09 RX ORDER — ACETAMINOPHEN 500 MG
975 TABLET ORAL EVERY 6 HOURS
Qty: 0 | Refills: 0 | Status: DISCONTINUED | OUTPATIENT
Start: 2017-06-09 | End: 2017-06-11

## 2017-06-09 RX ADMIN — HEPARIN SODIUM 5000 UNIT(S): 5000 INJECTION INTRAVENOUS; SUBCUTANEOUS at 18:02

## 2017-06-09 RX ADMIN — Medication 30 MILLIGRAM(S): at 23:49

## 2017-06-09 RX ADMIN — Medication 30 MILLIGRAM(S): at 05:28

## 2017-06-09 RX ADMIN — Medication 30 MILLIGRAM(S): at 12:25

## 2017-06-09 RX ADMIN — Medication 30 MILLIGRAM(S): at 04:58

## 2017-06-09 RX ADMIN — Medication 100 MILLIGRAM(S): at 11:55

## 2017-06-09 RX ADMIN — Medication 30 MILLIGRAM(S): at 18:32

## 2017-06-09 RX ADMIN — Medication 250 MILLIGRAM(S): at 18:02

## 2017-06-09 RX ADMIN — Medication 30 MILLIGRAM(S): at 11:55

## 2017-06-09 RX ADMIN — HEPARIN SODIUM 5000 UNIT(S): 5000 INJECTION INTRAVENOUS; SUBCUTANEOUS at 04:57

## 2017-06-09 RX ADMIN — SODIUM CHLORIDE 125 MILLILITER(S): 9 INJECTION, SOLUTION INTRAVENOUS at 07:50

## 2017-06-09 RX ADMIN — Medication 1 TABLET(S): at 11:55

## 2017-06-09 RX ADMIN — Medication 325 MILLIGRAM(S): at 11:55

## 2017-06-09 RX ADMIN — Medication 250 MILLIGRAM(S): at 11:55

## 2017-06-09 RX ADMIN — Medication 30 MILLIGRAM(S): at 18:02

## 2017-06-09 RX ADMIN — Medication 325 MILLIGRAM(S): at 18:02

## 2017-06-09 NOTE — PROGRESS NOTE ADULT - SUBJECTIVE AND OBJECTIVE BOX
PA NOTE    POD#1     Vital Signs Last 24 Hrs  T(C): 36.8, Max: 37.1 ( @ 17:10)  T(F): 98.2, Max: 98.8 ( @ 17:10)  HR: 117 (108 - 128)  BP: 128/76 (105/59 - 150/65)  BP(mean): 91 (85 - 99)  RR: 18 (18 - 27)  SpO2: 99% (92% - 100%)                          9.0    15.8  )-----------( 264      ( 2017 06:39 )             28.3     9.0/28.3  12.2/37.6      Plan:  - Ferrous Sulfate, Colace, Vitamin C supplementation.  - Monitor for signs/symptoms of anemia.

## 2017-06-09 NOTE — PROGRESS NOTE ADULT - SUBJECTIVE AND OBJECTIVE BOX
30y/o  POD#1 from pLTCS for NRFHT in stable condition. EBL: 1L    Patient seen and examined at bedside, no acute overnight events. No acute complaints, pain well controlled.  Patient is ambulating and tolerating regular diet. Has not yet passed flatus. Simpson is still in place.     Vital Signs Last 24 Hours  T(C): 36.6, Max: 37.1 (06-08 @ 17:10)  HR: 127 (108 - 128)  BP: 130/80 (105/59 - 150/65)  RR: 18 (18 - 27)  SpO2: 99% (92% - 100%)  Wt(kg): --    I&O's Summary    I & Os for current day (as of 2017 02:24)  =============================================  IN: 2000 ml / OUT: 1975 ml / NET: 25 ml      Physical Exam:  General: NAD  Abdomen: Soft, non-tender, non-distended, fundus firm  Incision: Pfannenstiel incision CDI, subcuticular suture closure  Pelvic: Lochia wnl    Labs:    Blood Type: O Positive  RPR: Negative  Rubella immune               12.2   10.6  )-----------( 325      ( -08 @ 13:34 )             37.6         MEDICATIONS  (STANDING):  metoclopramide Injectable 10milliGRAM(s) IV Push once  oxytocin Infusion 41.667milliUNIT(s)/Min IV Continuous <Continuous>  ketorolac   Injectable 30milliGRAM(s) IV Push every 6 hours  fentaNYL (3 MICROgram(s)/mL) + BUpivacaine 0.01% in 0.9% Sodium Chloride PCEA 250milliLiter(s) Epidural PCA Continuous    MEDICATIONS  (PRN):  fentaNYL (3 MICROgram(s)/mL) + BUpivacaine 0.01% in 0.9% Sodium Chloride PCEA Rescue Clinician Bolus 5milliLiter(s) Epidural every 15 minutes PRN Moderate Pain (4 - 6)  naloxone Injectable 0.1milliGRAM(s) IV Push every 3 minutes PRN For ANY of the following changes in patient status:  A. RR LESS THAN 10 breaths per minute, B. Oxygen saturation LESS THAN 90%, C. Sedation score of 6  ondansetron Injectable 4milliGRAM(s) IV Push every 6 hours PRN Nausea  dexamethasone  Injectable 4milliGRAM(s) IV Push every 6 hours PRN Nausea, IF ondansetron is ineffective after 30 - 60 minutes

## 2017-06-09 NOTE — PROGRESS NOTE ADULT - SUBJECTIVE AND OBJECTIVE BOX
Pain Management Attending Addendum    SUBJECTIVE:    Therapy:	  [ ] IV PCA	   [x ] Epidural           [ ] s/p Spinal Opoid              [ ] Postpartum infusion	  [ ] Patient controlled regional anesthesia (PCRA)    [ ] prn Analgesics    OBJECTIVE:   [x ] No new signs     [ ] Other:    Side Effects:  [x ] None			[ ] Other:    Assessment of Catheter Site:		[x ] Intact		[ ] Other:    ASSESSMENT/PLAN  [ x] Continue current therapy    [ ] Therapy changed to:    [ ] IV PCA       [ ] Epidural     [ ] prn Analgesics     [ ] post partum infusion    Comments:

## 2017-06-09 NOTE — PROGRESS NOTE ADULT - SUBJECTIVE AND OBJECTIVE BOX
Day 1 of Anesthesia Pain Management Service    SUBJECTIVE:  Pain Scale Score	At rest: ___ 	With Activity: ___ 	[ X ] Refer to charted pain scores    THERAPY:  [ ] Epidural Bupivacaine 0.0625% and Hydromorphone 10 micrograms/mL  [ ] Epidural Ropivacaine 0.2% plain   [X ] Epidural Bupivacaine 0.01 % and Fentanyl 3 micrograms/mL  (OB)    Demand dose 3mL lockout 15 (minutes) 10mL Continuous Rate       MEDICATIONS  (STANDING):  metoclopramide Injectable 10milliGRAM(s) IV Push once  lactated ringers. 1000milliLiter(s) IV Continuous <Continuous>  oxytocin Infusion 41.667milliUNIT(s)/Min IV Continuous <Continuous>  lactated ringers. 1000milliLiter(s) IV Continuous <Continuous>  diphtheria/tetanus/pertussis (acellular) Vaccine (ADAcel) 0.5milliLiter(s) IntraMuscular once  oxytocin Infusion 41.667milliUNIT(s)/Min IV Continuous <Continuous>  prenatal multivitamin 1Tablet(s) Oral daily  heparin  Injectable 5000Unit(s) SubCutaneous every 12 hours  ketorolac   Injectable 30milliGRAM(s) IV Push every 6 hours  acetaminophen   Tablet. 975milliGRAM(s) Oral every 6 hours  fentaNYL (3 MICROgram(s)/mL) + BUpivacaine 0.01% in 0.9% Sodium Chloride PCEA 250milliLiter(s) Epidural PCA Continuous  ferrous    sulfate 325milliGRAM(s) Oral three times a day with meals  ascorbic acid 250milliGRAM(s) Oral three times a day    MEDICATIONS  (PRN):  simethicone 80milliGRAM(s) Chew every 4 hours PRN Gas  diphenhydrAMINE   Capsule 25milliGRAM(s) Oral every 6 hours PRN Itching  glycerin Suppository - Adult 1Suppository(s) Rectal at bedtime PRN Constipation  lanolin Ointment 1Application(s) Topical every 3 hours PRN Sore Nipples  fentaNYL (3 MICROgram(s)/mL) + BUpivacaine 0.01% in 0.9% Sodium Chloride PCEA Rescue Clinician Bolus 5milliLiter(s) Epidural every 15 minutes PRN Moderate Pain (4 - 6)  naloxone Injectable 0.1milliGRAM(s) IV Push every 3 minutes PRN For ANY of the following changes in patient status:  A. RR LESS THAN 10 breaths per minute, B. Oxygen saturation LESS THAN 90%, C. Sedation score of 6  ondansetron Injectable 4milliGRAM(s) IV Push every 6 hours PRN Nausea  dexamethasone  Injectable 4milliGRAM(s) IV Push every 6 hours PRN Nausea, IF ondansetron is ineffective after 30 - 60 minutes  docusate sodium 100milliGRAM(s) Oral three times a day PRN Stool Softening      OBJECTIVE:    Assessment of Epidural Catheter Site: 	    [x ] Dressing intact	[ x] Site non-tender	[x ] Site without erythema, discharge, edema  [ ] Epidural tubing and connection checked	[ x] Gross neurological exam within normal limits  [ ] Catheter removed – tip intact		                          9.0    15.8  )-----------( 264      ( 09 Jun 2017 06:39 )             28.3     Vital Signs Last 24 Hrs  T(C): 36.8, Max: 37.1 (06-08 @ 17:10)  T(F): 98.2, Max: 98.8 (06-08 @ 17:10)  HR: 117 (108 - 128)  BP: 128/76 (105/59 - 150/65)  BP(mean): 91 (85 - 99)  RR: 18 (18 - 27)  SpO2: 99% (92% - 100%)      Sedation Score:	[x ] Alert	[ ] Drowsy	[ ] Arousable  [ ] Asleep  [ ] Unresponsive    Side Effects:	[x ] None	[ ] Nausea	[ ] Vomiting  [ ] Pruritus  		[ ] Weakness  [ ] Numbness  [ ] Other:    ASSESSMENT/ PLAN:    Therapy to  be:	[x ] Continue   [ ] Discontinued   [ ] Change to prn Analgesics    Documentation and Verification of current medications:  [ X ] Done	[ ] Not done, not eligible, reason:    Comments:

## 2017-06-10 RX ORDER — OXYCODONE HYDROCHLORIDE 5 MG/1
5 TABLET ORAL
Qty: 0 | Refills: 0 | Status: DISCONTINUED | OUTPATIENT
Start: 2017-06-10 | End: 2017-06-11

## 2017-06-10 RX ORDER — IBUPROFEN 200 MG
600 TABLET ORAL EVERY 6 HOURS
Qty: 0 | Refills: 0 | Status: DISCONTINUED | OUTPATIENT
Start: 2017-06-10 | End: 2017-06-11

## 2017-06-10 RX ORDER — HYDROMORPHONE HYDROCHLORIDE 2 MG/ML
2 INJECTION INTRAMUSCULAR; INTRAVENOUS; SUBCUTANEOUS ONCE
Qty: 0 | Refills: 0 | Status: DISCONTINUED | OUTPATIENT
Start: 2017-06-10 | End: 2017-06-10

## 2017-06-10 RX ADMIN — Medication 30 MILLIGRAM(S): at 00:15

## 2017-06-10 RX ADMIN — Medication 250 MILLIGRAM(S): at 12:33

## 2017-06-10 RX ADMIN — Medication 30 MILLIGRAM(S): at 05:06

## 2017-06-10 RX ADMIN — Medication 600 MILLIGRAM(S): at 20:20

## 2017-06-10 RX ADMIN — HEPARIN SODIUM 5000 UNIT(S): 5000 INJECTION INTRAVENOUS; SUBCUTANEOUS at 05:07

## 2017-06-10 RX ADMIN — Medication 600 MILLIGRAM(S): at 19:49

## 2017-06-10 RX ADMIN — Medication 250 MILLIGRAM(S): at 17:40

## 2017-06-10 RX ADMIN — HEPARIN SODIUM 5000 UNIT(S): 5000 INJECTION INTRAVENOUS; SUBCUTANEOUS at 17:39

## 2017-06-10 RX ADMIN — ONDANSETRON 4 MILLIGRAM(S): 8 TABLET, FILM COATED ORAL at 05:20

## 2017-06-10 RX ADMIN — Medication 975 MILLIGRAM(S): at 20:20

## 2017-06-10 RX ADMIN — Medication 975 MILLIGRAM(S): at 13:35

## 2017-06-10 RX ADMIN — Medication 975 MILLIGRAM(S): at 09:33

## 2017-06-10 RX ADMIN — Medication 325 MILLIGRAM(S): at 08:42

## 2017-06-10 RX ADMIN — Medication 975 MILLIGRAM(S): at 14:10

## 2017-06-10 RX ADMIN — Medication 1 TABLET(S): at 12:33

## 2017-06-10 RX ADMIN — Medication 600 MILLIGRAM(S): at 14:10

## 2017-06-10 RX ADMIN — Medication 30 MILLIGRAM(S): at 05:56

## 2017-06-10 RX ADMIN — Medication 600 MILLIGRAM(S): at 09:33

## 2017-06-10 RX ADMIN — Medication 325 MILLIGRAM(S): at 12:33

## 2017-06-10 RX ADMIN — Medication 975 MILLIGRAM(S): at 08:43

## 2017-06-10 RX ADMIN — Medication 325 MILLIGRAM(S): at 17:40

## 2017-06-10 RX ADMIN — Medication 975 MILLIGRAM(S): at 19:49

## 2017-06-10 RX ADMIN — Medication 600 MILLIGRAM(S): at 08:42

## 2017-06-10 RX ADMIN — Medication 600 MILLIGRAM(S): at 13:34

## 2017-06-10 NOTE — PROGRESS NOTE ADULT - SUBJECTIVE AND OBJECTIVE BOX
OB Progress Note: pTLCS, POD#2    S: 32yo  POD#2 s/p pLTCS. Her pain is well controlled. She is tolerating a regular diet and passing flatus. Voiding spontaneously. Denies N/V. Denies CP/SOB/lightheadedness/dizziness.     O:  Vitals:  Vital Signs Last 24 Hrs  T(C): 36.6, Max: 36.8 (06-09 @ 10:00)  T(F): 97.9, Max: 98.2 (06-09 @ 10:00)  HR: 105 (98 - 109)  BP: 117/76 (116/70 - 135/74)  BP(mean): --  RR: 18 (18 - 18)  SpO2: 100% (98% - 100%)    MEDICATIONS  (STANDING):  metoclopramide Injectable 10milliGRAM(s) IV Push once  lactated ringers. 1000milliLiter(s) IV Continuous <Continuous>  oxytocin Infusion 41.667milliUNIT(s)/Min IV Continuous <Continuous>  lactated ringers. 1000milliLiter(s) IV Continuous <Continuous>  diphtheria/tetanus/pertussis (acellular) Vaccine (ADAcel) 0.5milliLiter(s) IntraMuscular once  oxytocin Infusion 41.667milliUNIT(s)/Min IV Continuous <Continuous>  prenatal multivitamin 1Tablet(s) Oral daily  heparin  Injectable 5000Unit(s) SubCutaneous every 12 hours  ketorolac   Injectable 30milliGRAM(s) IV Push every 6 hours  oxyCODONE IR 5milliGRAM(s) Oral every 3 hours  ibuprofen  Tablet 600milliGRAM(s) Oral every 6 hours  fentaNYL (3 MICROgram(s)/mL) + BUpivacaine 0.01% in 0.9% Sodium Chloride PCEA 250milliLiter(s) Epidural PCA Continuous  ferrous    sulfate 325milliGRAM(s) Oral three times a day with meals  ascorbic acid 250milliGRAM(s) Oral three times a day  acetaminophen   Tablet. 975milliGRAM(s) Oral every 6 hours      MEDICATIONS  (PRN):  simethicone 80milliGRAM(s) Chew every 4 hours PRN Gas  diphenhydrAMINE   Capsule 25milliGRAM(s) Oral every 6 hours PRN Itching  glycerin Suppository - Adult 1Suppository(s) Rectal at bedtime PRN Constipation  lanolin Ointment 1Application(s) Topical every 3 hours PRN Sore Nipples  oxyCODONE IR 5milliGRAM(s) Oral every 4 hours PRN Severe Pain (7 - 10)  fentaNYL (3 MICROgram(s)/mL) + BUpivacaine 0.01% in 0.9% Sodium Chloride PCEA Rescue Clinician Bolus 5milliLiter(s) Epidural every 15 minutes PRN Moderate Pain (4 - 6)  naloxone Injectable 0.1milliGRAM(s) IV Push every 3 minutes PRN For ANY of the following changes in patient status:  A. RR LESS THAN 10 breaths per minute, B. Oxygen saturation LESS THAN 90%, C. Sedation score of 6  ondansetron Injectable 4milliGRAM(s) IV Push every 6 hours PRN Nausea  dexamethasone  Injectable 4milliGRAM(s) IV Push every 6 hours PRN Nausea, IF ondansetron is ineffective after 30 - 60 minutes  docusate sodium 100milliGRAM(s) Oral three times a day PRN Stool Softening      Labs:  Blood type: O Positive  Rubella IgG: RPR: Negative                          9.0<L>   15.8<H> >-----------< 264    (  @ 06:39 )             28.3<L>                        12.2   10.6<H> >-----------< 325    (  @ 13:34 )             37.6                  PE:  General: NAD  Abdomen: Soft, appropriately tender, incision c/d/i.  Extremities: No erythema, no pitting edema    A/P: 32yo G 1 P 1  POD#2 s/p pLTCS.   - Continue regular diet.  - Increase ambulation.  - Continue motrin, tylenol, oxycodone PRN for pain control.    Opal Matamoros PGY-2

## 2017-06-10 NOTE — PROGRESS NOTE ADULT - SUBJECTIVE AND OBJECTIVE BOX
Postpartum Note,  Section   ATTENDING NOTE Post-operative day 2    Subjective:  The patient feels well.  She is ambulating.   She is tolerating regular diet.  She denies nausea and vomiting.  She is voiding.  Her pain is controlled.  She reports normal postpartum bleeding    Physical exam:    Vital Signs Last 24 Hrs  T(C): 36.6, Max: 36.8 (-09 @ 10:00)  T(F): 97.9, Max: 98.2 (06-09 @ 10:00)  HR: 105 (98 - 109)  BP: 117/76 (116/70 - 135/74)  BP(mean): --  RR: 18 (18 - 18)  SpO2: 100% (98% - 100%)    Gen: NAD  Breast: Soft, nontender, not engorged.  Abdomen: Soft, nontender, no distension , firm uterine fundus at umbilicus.  Incision: Clean, dry, and intact  Pelvic: Normal lochia noted  Ext: No calf tenderness    LABS:                        9.0    15.8  )-----------( 264      ( 2017 06:39 )             28.3                         12.2   10.6  )-----------( 325      ( 2017 13:34 )             37.6                   Allergies    No Known Allergies    Intolerances      MEDICATIONS  (STANDING):  metoclopramide Injectable 10milliGRAM(s) IV Push once  lactated ringers. 1000milliLiter(s) IV Continuous <Continuous>  oxytocin Infusion 41.667milliUNIT(s)/Min IV Continuous <Continuous>  lactated ringers. 1000milliLiter(s) IV Continuous <Continuous>  diphtheria/tetanus/pertussis (acellular) Vaccine (ADAcel) 0.5milliLiter(s) IntraMuscular once  oxytocin Infusion 41.667milliUNIT(s)/Min IV Continuous <Continuous>  prenatal multivitamin 1Tablet(s) Oral daily  heparin  Injectable 5000Unit(s) SubCutaneous every 12 hours  oxyCODONE IR 5milliGRAM(s) Oral every 3 hours  fentaNYL (3 MICROgram(s)/mL) + BUpivacaine 0.01% in 0.9% Sodium Chloride PCEA 250milliLiter(s) Epidural PCA Continuous  ferrous    sulfate 325milliGRAM(s) Oral three times a day with meals  ascorbic acid 250milliGRAM(s) Oral three times a day  acetaminophen   Tablet. 975milliGRAM(s) Oral every 6 hours  ibuprofen  Tablet 600milliGRAM(s) Oral every 6 hours    MEDICATIONS  (PRN):  simethicone 80milliGRAM(s) Chew every 4 hours PRN Gas  diphenhydrAMINE   Capsule 25milliGRAM(s) Oral every 6 hours PRN Itching  glycerin Suppository - Adult 1Suppository(s) Rectal at bedtime PRN Constipation  lanolin Ointment 1Application(s) Topical every 3 hours PRN Sore Nipples  oxyCODONE IR 5milliGRAM(s) Oral every 4 hours PRN Severe Pain (7 - 10)  fentaNYL (3 MICROgram(s)/mL) + BUpivacaine 0.01% in 0.9% Sodium Chloride PCEA Rescue Clinician Bolus 5milliLiter(s) Epidural every 15 minutes PRN Moderate Pain (4 - 6)  naloxone Injectable 0.1milliGRAM(s) IV Push every 3 minutes PRN For ANY of the following changes in patient status:  A. RR LESS THAN 10 breaths per minute, B. Oxygen saturation LESS THAN 90%, C. Sedation score of 6  ondansetron Injectable 4milliGRAM(s) IV Push every 6 hours PRN Nausea  dexamethasone  Injectable 4milliGRAM(s) IV Push every 6 hours PRN Nausea, IF ondansetron is ineffective after 30 - 60 minutes  docusate sodium 100milliGRAM(s) Oral three times a day PRN Stool Softening        Assessment and Plan  POD # 2  s/p  section. Stable.  Encourage ambulation  Analgesia prn  Regular diet as tolerated    Physician contact/office  number [phone]

## 2017-06-10 NOTE — PROGRESS NOTE ADULT - SUBJECTIVE AND OBJECTIVE BOX
Day __2_ of Anesthesia Pain Management Service    SUBJECTIVE:  Pain Scale Score	At rest: __2_ 	With Activity: _2__ 	[  ] Refer to charted pain scores    THERAPY:  [ ] Epidural Bupivacaine 0.0625% and Hydromorphone 10 micrograms/mL  [ ] Epidural Ropivacaine 0.2% plain   [x ] Epidural Bupivacaine 0.01 % and Fentanyl 3 micrograms/mL  (OB)    Demand dose ___ lockout ___ (minutes) Continuous Rate ___       MEDICATIONS  (STANDING):  metoclopramide Injectable 10milliGRAM(s) IV Push once  lactated ringers. 1000milliLiter(s) IV Continuous <Continuous>  oxytocin Infusion 41.667milliUNIT(s)/Min IV Continuous <Continuous>  lactated ringers. 1000milliLiter(s) IV Continuous <Continuous>  diphtheria/tetanus/pertussis (acellular) Vaccine (ADAcel) 0.5milliLiter(s) IntraMuscular once  oxytocin Infusion 41.667milliUNIT(s)/Min IV Continuous <Continuous>  prenatal multivitamin 1Tablet(s) Oral daily  heparin  Injectable 5000Unit(s) SubCutaneous every 12 hours  oxyCODONE IR 5milliGRAM(s) Oral every 3 hours  fentaNYL (3 MICROgram(s)/mL) + BUpivacaine 0.01% in 0.9% Sodium Chloride PCEA 250milliLiter(s) Epidural PCA Continuous  ferrous    sulfate 325milliGRAM(s) Oral three times a day with meals  ascorbic acid 250milliGRAM(s) Oral three times a day  acetaminophen   Tablet. 975milliGRAM(s) Oral every 6 hours  ibuprofen  Tablet 600milliGRAM(s) Oral every 6 hours    MEDICATIONS  (PRN):  simethicone 80milliGRAM(s) Chew every 4 hours PRN Gas  diphenhydrAMINE   Capsule 25milliGRAM(s) Oral every 6 hours PRN Itching  glycerin Suppository - Adult 1Suppository(s) Rectal at bedtime PRN Constipation  lanolin Ointment 1Application(s) Topical every 3 hours PRN Sore Nipples  oxyCODONE IR 5milliGRAM(s) Oral every 4 hours PRN Severe Pain (7 - 10)  fentaNYL (3 MICROgram(s)/mL) + BUpivacaine 0.01% in 0.9% Sodium Chloride PCEA Rescue Clinician Bolus 5milliLiter(s) Epidural every 15 minutes PRN Moderate Pain (4 - 6)  naloxone Injectable 0.1milliGRAM(s) IV Push every 3 minutes PRN For ANY of the following changes in patient status:  A. RR LESS THAN 10 breaths per minute, B. Oxygen saturation LESS THAN 90%, C. Sedation score of 6  ondansetron Injectable 4milliGRAM(s) IV Push every 6 hours PRN Nausea  dexamethasone  Injectable 4milliGRAM(s) IV Push every 6 hours PRN Nausea, IF ondansetron is ineffective after 30 - 60 minutes  docusate sodium 100milliGRAM(s) Oral three times a day PRN Stool Softening      OBJECTIVE:    Assessment of Epidural Catheter Site: 	    [ x] Dressing intact	[ ] Site non-tender	[ ] Site without erythema, discharge, edema  [ ] Epidural tubing and connection checked	[ [ Gross neurological exam within normal limits  [ ] Catheter removed – tip intact		                          9.0    15.8  )-----------( 264      ( 09 Jun 2017 06:39 )             28.3     Vital Signs Last 24 Hrs  T(C): 36.6, Max: 36.8 (06-09 @ 13:00)  T(F): 97.9, Max: 98.2 (06-09 @ 13:00)  HR: 105 (98 - 109)  BP: 117/76 (116/70 - 135/74)  BP(mean): --  RR: 18 (18 - 18)  SpO2: 100% (99% - 100%)      Sedation Score:	[x ] Alert	[ ] Drowsy	[ ] Arousable  [ ] Asleep  [ ] Unresponsive    Side Effects:	[x ] None	[ ] Nausea	[ ] Vomiting  [ ] Pruritus  		[ ] Weakness  [ ] Numbness  [ ] Other:    ASSESSMENT/ PLAN:    Therapy to  be:	[ ] Continue   [x ] Discontinued   [ ] Change to prn Analgesics    Documentation and Verification of current medications:  [ X ] Done	[ ] Not done, not eligible, reason:    Comments:

## 2017-06-11 VITALS
OXYGEN SATURATION: 99 % | DIASTOLIC BLOOD PRESSURE: 58 MMHG | TEMPERATURE: 98 F | RESPIRATION RATE: 18 BRPM | SYSTOLIC BLOOD PRESSURE: 111 MMHG | HEART RATE: 88 BPM

## 2017-06-11 LAB
HCT VFR BLD CALC: 28.2 % — LOW (ref 34.5–45)
HGB BLD-MCNC: 8.8 G/DL — LOW (ref 11.5–15.5)
MCHC RBC-ENTMCNC: 22.5 PG — LOW (ref 27–34)
MCHC RBC-ENTMCNC: 31.2 GM/DL — LOW (ref 32–36)
MCV RBC AUTO: 72.1 FL — LOW (ref 80–100)
PLATELET # BLD AUTO: 289 K/UL — SIGNIFICANT CHANGE UP (ref 150–400)
RBC # BLD: 3.91 M/UL — SIGNIFICANT CHANGE UP (ref 3.8–5.2)
RBC # FLD: 14.3 % — SIGNIFICANT CHANGE UP (ref 10.3–14.5)
WBC # BLD: 10.8 K/UL — HIGH (ref 3.8–10.5)
WBC # FLD AUTO: 10.8 K/UL — HIGH (ref 3.8–10.5)

## 2017-06-11 PROCEDURE — 86850 RBC ANTIBODY SCREEN: CPT

## 2017-06-11 PROCEDURE — G0463: CPT

## 2017-06-11 PROCEDURE — 86900 BLOOD TYPING SEROLOGIC ABO: CPT

## 2017-06-11 PROCEDURE — 85027 COMPLETE CBC AUTOMATED: CPT

## 2017-06-11 PROCEDURE — 88307 TISSUE EXAM BY PATHOLOGIST: CPT

## 2017-06-11 PROCEDURE — 93005 ELECTROCARDIOGRAM TRACING: CPT

## 2017-06-11 PROCEDURE — 59025 FETAL NON-STRESS TEST: CPT

## 2017-06-11 PROCEDURE — 86901 BLOOD TYPING SEROLOGIC RH(D): CPT

## 2017-06-11 PROCEDURE — 59050 FETAL MONITOR W/REPORT: CPT

## 2017-06-11 PROCEDURE — 86780 TREPONEMA PALLIDUM: CPT

## 2017-06-11 RX ORDER — DOCUSATE SODIUM 100 MG
1 CAPSULE ORAL
Qty: 0 | Refills: 0 | DISCHARGE
Start: 2017-06-11

## 2017-06-11 RX ORDER — ACETAMINOPHEN 500 MG
3 TABLET ORAL
Qty: 0 | Refills: 0 | DISCHARGE
Start: 2017-06-11

## 2017-06-11 RX ORDER — IBUPROFEN 200 MG
1 TABLET ORAL
Qty: 0 | Refills: 0 | DISCHARGE
Start: 2017-06-11

## 2017-06-11 RX ORDER — ASCORBIC ACID 60 MG
1 TABLET,CHEWABLE ORAL
Qty: 0 | Refills: 0 | DISCHARGE
Start: 2017-06-11

## 2017-06-11 RX ORDER — LANOLIN
1 OINTMENT (GRAM) TOPICAL
Qty: 0 | Refills: 0 | DISCHARGE
Start: 2017-06-11

## 2017-06-11 RX ADMIN — Medication 600 MILLIGRAM(S): at 08:09

## 2017-06-11 RX ADMIN — HEPARIN SODIUM 5000 UNIT(S): 5000 INJECTION INTRAVENOUS; SUBCUTANEOUS at 06:11

## 2017-06-11 RX ADMIN — Medication 975 MILLIGRAM(S): at 08:30

## 2017-06-11 RX ADMIN — Medication 250 MILLIGRAM(S): at 08:09

## 2017-06-11 RX ADMIN — Medication 600 MILLIGRAM(S): at 08:40

## 2017-06-11 RX ADMIN — Medication 975 MILLIGRAM(S): at 01:51

## 2017-06-11 RX ADMIN — Medication 600 MILLIGRAM(S): at 02:20

## 2017-06-11 RX ADMIN — Medication 975 MILLIGRAM(S): at 02:20

## 2017-06-11 RX ADMIN — Medication 975 MILLIGRAM(S): at 08:09

## 2017-06-11 RX ADMIN — Medication 325 MILLIGRAM(S): at 08:09

## 2017-06-11 RX ADMIN — Medication 600 MILLIGRAM(S): at 01:51

## 2017-06-11 NOTE — DISCHARGE NOTE OB - CARE PROVIDER_API CALL
Chana Mcguire), OBSGYN  General  [address] [place] [id number]  Phone: [phone]  Fax: [phone]

## 2017-06-11 NOTE — DISCHARGE NOTE OB - MATERIALS PROVIDED
Breastfeeding Guide and Packet/Breastfeeding Mother’s Support Group Information/Guide to Postpartum Care/Back To Sleep Handout/Breastfeeding Log/[facility]  Screening Program/Bottle Feeding Log/Shaken Baby Prevention Handout/[facility] Hearing Screen Program

## 2017-06-11 NOTE — DISCHARGE NOTE OB - HOSPITAL COURSE
32 y/o G1 at 40wks 3 days presented to L/D for prolonged monitoring due to a NRNST. S/P Repeat C/S, Patient delivered a live female infant. Postpartum course was unremarkable. Pt stable and discharged home

## 2017-06-11 NOTE — DISCHARGE NOTE OB - CARE PLAN
Principal Discharge DX:	 delivery, delivered, current hospitalization  Goal:	postpartum care  Instructions for follow-up, activity and diet:	pelvic rest

## 2017-06-11 NOTE — PROGRESS NOTE ADULT - SUBJECTIVE AND OBJECTIVE BOX
OB Postpartum Note: Primary  Delivery, POD#3    S: 32yo  POD#3 s/p pLTCS. The patient feels well. Her pain is controlled. She is ambulating, tolerating a regular diet, denies nausea and vomiting.  +voiding. +flatus.      O:  Vitals:  Vital Signs Last 24 Hrs  T(C): 36.9, Max: 37 (06-10 @ 18:00)  T(F): 98.4, Max: 98.6 (06-10 @ 18:00)  HR: 88 (88 - 100)  BP: 111/58 (111/58 - 129/84)  BP(mean): --  RR: 18 (18 - 18)  SpO2: 99% (97% - 99%)    MEDICATIONS  (STANDING):  metoclopramide Injectable 10milliGRAM(s) IV Push once  lactated ringers. 1000milliLiter(s) IV Continuous <Continuous>  oxytocin Infusion 41.667milliUNIT(s)/Min IV Continuous <Continuous>  lactated ringers. 1000milliLiter(s) IV Continuous <Continuous>  diphtheria/tetanus/pertussis (acellular) Vaccine (ADAcel) 0.5milliLiter(s) IntraMuscular once  oxytocin Infusion 41.667milliUNIT(s)/Min IV Continuous <Continuous>  prenatal multivitamin 1Tablet(s) Oral daily  heparin  Injectable 5000Unit(s) SubCutaneous every 12 hours  ferrous    sulfate 325milliGRAM(s) Oral three times a day with meals  ascorbic acid 250milliGRAM(s) Oral three times a day  acetaminophen   Tablet. 975milliGRAM(s) Oral every 6 hours  ibuprofen  Tablet 600milliGRAM(s) Oral every 6 hours  oxyCODONE IR 5milliGRAM(s) Oral every 3 hours    MEDICATIONS  (PRN):  simethicone 80milliGRAM(s) Chew every 4 hours PRN Gas  diphenhydrAMINE   Capsule 25milliGRAM(s) Oral every 6 hours PRN Itching  glycerin Suppository - Adult 1Suppository(s) Rectal at bedtime PRN Constipation  lanolin Ointment 1Application(s) Topical every 3 hours PRN Sore Nipples  oxyCODONE IR 5milliGRAM(s) Oral every 4 hours PRN Severe Pain (7 - 10)  docusate sodium 100milliGRAM(s) Oral three times a day PRN Stool Softening      LABS:  Blood type: O Positive  RPR: Negative                          8.8<L>   10.8<H> >-----------< 289    (  @ 06:21 )             28.2<L>                        9.0<L>   15.8<H> >-----------< 264    (  @ 06:39 )             28.3<L>                        12.2   10.6<H> >-----------< 325    (  @ 13:34 )             37.6                  Physical exam:  Gen: NAD  Abdomen: Soft, nontender, no distension , firm uterine fundus at umbilicus.  Incision: Clean, dry, and intact   Pelvic: Normal lochia noted  Ext: No calf tenderness    A/P: 32yo  POD#3 s/p pLTCS.  - Continue motrin, tylenol, oxycodone PRN for pain control.  - Increase ambulation  - Continue regular diet  -Fe/Colace/Vit C TID for anemia  - Discharge planning    Opal [place] PGY-1 OB Postpartum Note: Primary  Delivery, POD#3    S: 32yo  POD#3 s/p pLTCS. The patient feels well. Her pain is controlled. She is ambulating, tolerating a regular diet, denies nausea and vomiting.  +voiding. +flatus.      O:  Vitals:  Vital Signs Last 24 Hrs  T(C): 36.9, Max: 37 (06-10 @ 18:00)  T(F): 98.4, Max: 98.6 (06-10 @ 18:00)  HR: 88 (88 - 100)  BP: 111/58 (111/58 - 129/84)  BP(mean): --  RR: 18 (18 - 18)  SpO2: 99% (97% - 99%)    MEDICATIONS  (STANDING):  metoclopramide Injectable 10milliGRAM(s) IV Push once  lactated ringers. 1000milliLiter(s) IV Continuous <Continuous>  oxytocin Infusion 41.667milliUNIT(s)/Min IV Continuous <Continuous>  lactated ringers. 1000milliLiter(s) IV Continuous <Continuous>  diphtheria/tetanus/pertussis (acellular) Vaccine (ADAcel) 0.5milliLiter(s) IntraMuscular once  oxytocin Infusion 41.667milliUNIT(s)/Min IV Continuous <Continuous>  prenatal multivitamin 1Tablet(s) Oral daily  heparin  Injectable 5000Unit(s) SubCutaneous every 12 hours  ferrous    sulfate 325milliGRAM(s) Oral three times a day with meals  ascorbic acid 250milliGRAM(s) Oral three times a day  acetaminophen   Tablet. 975milliGRAM(s) Oral every 6 hours  ibuprofen  Tablet 600milliGRAM(s) Oral every 6 hours  oxyCODONE IR 5milliGRAM(s) Oral every 3 hours    MEDICATIONS  (PRN):  simethicone 80milliGRAM(s) Chew every 4 hours PRN Gas  diphenhydrAMINE   Capsule 25milliGRAM(s) Oral every 6 hours PRN Itching  glycerin Suppository - Adult 1Suppository(s) Rectal at bedtime PRN Constipation  lanolin Ointment 1Application(s) Topical every 3 hours PRN Sore Nipples  oxyCODONE IR 5milliGRAM(s) Oral every 4 hours PRN Severe Pain (7 - 10)  docusate sodium 100milliGRAM(s) Oral three times a day PRN Stool Softening      LABS:  Blood type: O Positive  RPR: Negative                          8.8<L>   10.8<H> >-----------< 289    (  @ 06:21 )             28.2<L>                        9.0<L>   15.8<H> >-----------< 264    (  @ 06:39 )             28.3<L>                        12.2   10.6<H> >-----------< 325    (  @ 13:34 )             37.6                  Physical exam:  Gen: NAD  Abdomen: Soft, nontender, no distension , firm uterine fundus at umbilicus.  Incision: Clean, dry, and intact   Pelvic: Normal lochia noted  Ext: No calf tenderness

## 2017-06-11 NOTE — DISCHARGE NOTE OB - PATIENT PORTAL LINK FT
“You can access the [employer] Patient Portal, offered by [facility], by registering with the following website: [email or web address]”

## 2017-06-11 NOTE — PROGRESS NOTE ADULT - SUBJECTIVE AND OBJECTIVE BOX
Postpartum Note,  Section    ATTENDING NOTE Post-operative day 3    Subjective:  The patient feels well.  She is ambulating.   She is tolerating regular diet.  She denies nausea and vomiting.  She is voiding.  Her pain is controlled.  She reports normal postpartum bleeding    Physical exam:    Vital Signs Last 24 Hrs  T(C): 36.9, Max: 37 (06-10 @ 18:00)  T(F): 98.4, Max: 98.6 (06-10 @ 18:00)  HR: 88 (88 - 100)  BP: 111/58 (111/58 - 129/84)  BP(mean): --  RR: 18 (18 - 18)  SpO2: 99% (98% - 99%)    Gen: NAD  Breast: Soft, nontender, not engorged.  Abdomen: Soft, nontender, no distension , firm uterine fundus at umbilicus.  Incision: Clean, dry, and intact  Pelvic: Normal lochia noted  Ext: No calf tenderness    LABS:                        8.8    10.8  )-----------( 289      ( 2017 06:21 )             28.2                   Allergies    No Known Allergies    Intolerances      MEDICATIONS  (STANDING):  metoclopramide Injectable 10milliGRAM(s) IV Push once  lactated ringers. 1000milliLiter(s) IV Continuous <Continuous>  oxytocin Infusion 41.667milliUNIT(s)/Min IV Continuous <Continuous>  lactated ringers. 1000milliLiter(s) IV Continuous <Continuous>  diphtheria/tetanus/pertussis (acellular) Vaccine (ADAcel) 0.5milliLiter(s) IntraMuscular once  oxytocin Infusion 41.667milliUNIT(s)/Min IV Continuous <Continuous>  prenatal multivitamin 1Tablet(s) Oral daily  heparin  Injectable 5000Unit(s) SubCutaneous every 12 hours  ferrous    sulfate 325milliGRAM(s) Oral three times a day with meals  ascorbic acid 250milliGRAM(s) Oral three times a day  acetaminophen   Tablet. 975milliGRAM(s) Oral every 6 hours  ibuprofen  Tablet 600milliGRAM(s) Oral every 6 hours  oxyCODONE IR 5milliGRAM(s) Oral every 3 hours    MEDICATIONS  (PRN):  simethicone 80milliGRAM(s) Chew every 4 hours PRN Gas  diphenhydrAMINE   Capsule 25milliGRAM(s) Oral every 6 hours PRN Itching  glycerin Suppository - Adult 1Suppository(s) Rectal at bedtime PRN Constipation  lanolin Ointment 1Application(s) Topical every 3 hours PRN Sore Nipples  oxyCODONE IR 5milliGRAM(s) Oral every 4 hours PRN Severe Pain (7 - 10)  docusate sodium 100milliGRAM(s) Oral three times a day PRN Stool Softening        Assessment and Plan  POD # 3  s/p  section. Stable.  Encourage ambulation  Analgesia prn< Motrin and Tylenol  Regular diet   Discharge instructions given  F/U in office in 2 weeks with Dr Mcguire for incision check      Physician contact/office  number [phone]

## 2017-06-12 ENCOUNTER — APPOINTMENT (OUTPATIENT)
Dept: ANTEPARTUM | Facility: CLINIC | Age: 32
End: 2017-06-12

## 2017-06-13 LAB — SURGICAL PATHOLOGY STUDY: SIGNIFICANT CHANGE UP

## 2017-06-22 ENCOUNTER — APPOINTMENT (OUTPATIENT)
Dept: OBGYN | Facility: CLINIC | Age: 32
End: 2017-06-22

## 2017-06-22 VITALS
SYSTOLIC BLOOD PRESSURE: 120 MMHG | HEIGHT: 62 IN | BODY MASS INDEX: 32.94 KG/M2 | DIASTOLIC BLOOD PRESSURE: 80 MMHG | WEIGHT: 179 LBS

## 2017-06-22 DIAGNOSIS — O13.9 GESTATIONAL [PREGNANCY-INDUCED] HYPERTENSION W/OUT SIGNIFICANT PROTEINURIA, UNSPECIFIED TRIMESTER: ICD-10-CM

## 2017-06-22 DIAGNOSIS — Z34.03 ENCOUNTER FOR SUPERVISION OF NORMAL FIRST PREGNANCY, THIRD TRIMESTER: ICD-10-CM

## 2017-06-22 DIAGNOSIS — Z86.2 PERSONAL HISTORY OF DISEASES OF THE BLOOD AND BLOOD-FORMING ORGANS AND CERTAIN DISORDERS INVOLVING THE IMMUNE MECHANISM: ICD-10-CM

## 2017-06-22 DIAGNOSIS — O23.40 UNSPECIFIED INFECTION OF URINARY TRACT IN PREGNANCY, UNSPECIFIED TRIMESTER: ICD-10-CM

## 2017-06-23 PROBLEM — O13.9 ELEVATED BLOOD PRESSURE AFFECTING PREGNANCY, ANTEPARTUM: Status: RESOLVED | Noted: 2017-02-27 | Resolved: 2017-06-23

## 2017-06-23 PROBLEM — Z34.03 ENCOUNTER FOR PRENATAL CARE IN THIRD TRIMESTER OF FIRST PREGNANCY: Status: RESOLVED | Noted: 2017-05-17 | Resolved: 2017-06-23

## 2017-06-23 PROBLEM — O23.40 UTI IN PREGNANCY, ANTEPARTUM: Status: RESOLVED | Noted: 2017-02-01 | Resolved: 2017-06-23

## 2017-07-20 ENCOUNTER — APPOINTMENT (OUTPATIENT)
Dept: OBGYN | Facility: CLINIC | Age: 32
End: 2017-07-20

## 2017-07-20 VITALS
HEART RATE: 78 BPM | SYSTOLIC BLOOD PRESSURE: 132 MMHG | DIASTOLIC BLOOD PRESSURE: 78 MMHG | WEIGHT: 179 LBS | HEIGHT: 62 IN | BODY MASS INDEX: 32.94 KG/M2

## 2017-07-24 ENCOUNTER — RX RENEWAL (OUTPATIENT)
Age: 32
End: 2017-07-24

## 2017-08-15 ENCOUNTER — APPOINTMENT (OUTPATIENT)
Dept: ORTHOPEDIC SURGERY | Facility: CLINIC | Age: 32
End: 2017-08-15
Payer: COMMERCIAL

## 2017-08-15 VITALS
HEART RATE: 88 BPM | BODY MASS INDEX: 32.94 KG/M2 | SYSTOLIC BLOOD PRESSURE: 106 MMHG | DIASTOLIC BLOOD PRESSURE: 68 MMHG | WEIGHT: 179 LBS | HEIGHT: 62 IN

## 2017-08-15 PROCEDURE — 99214 OFFICE O/P EST MOD 30 MIN: CPT

## 2017-08-15 PROCEDURE — 73110 X-RAY EXAM OF WRIST: CPT | Mod: RT

## 2017-11-27 ENCOUNTER — APPOINTMENT (OUTPATIENT)
Dept: ORTHOPEDIC SURGERY | Facility: CLINIC | Age: 32
End: 2017-11-27
Payer: COMMERCIAL

## 2017-11-27 VITALS
DIASTOLIC BLOOD PRESSURE: 84 MMHG | BODY MASS INDEX: 32.94 KG/M2 | WEIGHT: 179 LBS | HEART RATE: 96 BPM | SYSTOLIC BLOOD PRESSURE: 130 MMHG | HEIGHT: 62 IN

## 2017-11-27 DIAGNOSIS — M65.4 RADIAL STYLOID TENOSYNOVITIS [DE QUERVAIN]: ICD-10-CM

## 2017-11-27 PROCEDURE — 99213 OFFICE O/P EST LOW 20 MIN: CPT

## 2018-04-11 ENCOUNTER — APPOINTMENT (OUTPATIENT)
Dept: ENDOCRINOLOGY | Facility: CLINIC | Age: 33
End: 2018-04-11
Payer: COMMERCIAL

## 2018-04-11 ENCOUNTER — LABORATORY RESULT (OUTPATIENT)
Age: 33
End: 2018-04-11

## 2018-04-11 VITALS
HEIGHT: 62 IN | DIASTOLIC BLOOD PRESSURE: 72 MMHG | SYSTOLIC BLOOD PRESSURE: 115 MMHG | HEART RATE: 101 BPM | BODY MASS INDEX: 35.33 KG/M2 | WEIGHT: 192 LBS | OXYGEN SATURATION: 98 %

## 2018-04-11 PROCEDURE — 99214 OFFICE O/P EST MOD 30 MIN: CPT | Mod: GC

## 2018-04-12 LAB
25(OH)D3 SERPL-MCNC: 16.6 NG/ML
ALBUMIN SERPL ELPH-MCNC: 4.2 G/DL
ALP BLD-CCNC: 77 U/L
ALT SERPL-CCNC: 16 U/L
ANION GAP SERPL CALC-SCNC: 13 MMOL/L
AST SERPL-CCNC: 18 U/L
BASOPHILS # BLD AUTO: 0.07 K/UL
BASOPHILS NFR BLD AUTO: 0.9 %
BILIRUB SERPL-MCNC: 0.5 MG/DL
BUN SERPL-MCNC: 12 MG/DL
CALCIUM SERPL-MCNC: 9.6 MG/DL
CHLORIDE SERPL-SCNC: 105 MMOL/L
CHOLEST SERPL-MCNC: 170 MG/DL
CHOLEST/HDLC SERPL: 2.6 RATIO
CO2 SERPL-SCNC: 22 MMOL/L
CREAT SERPL-MCNC: 0.72 MG/DL
EOSINOPHIL # BLD AUTO: 0 K/UL
EOSINOPHIL NFR BLD AUTO: 0 %
GLUCOSE SERPL-MCNC: 82 MG/DL
HBA1C MFR BLD HPLC: 5.7 %
HCT VFR BLD CALC: 31.3 %
HDLC SERPL-MCNC: 65 MG/DL
HGB BLD-MCNC: 9.3 G/DL
LDLC SERPL CALC-MCNC: 93 MG/DL
LYMPHOCYTES # BLD AUTO: 2.59 K/UL
LYMPHOCYTES NFR BLD AUTO: 32.4 %
MAN DIFF?: NORMAL
MCHC RBC-ENTMCNC: 17.9 PG
MCHC RBC-ENTMCNC: 29.7 GM/DL
MCV RBC AUTO: 60.1 FL
MONOCYTES # BLD AUTO: 0.72 K/UL
MONOCYTES NFR BLD AUTO: 9 %
NEUTROPHILS # BLD AUTO: 4.61 K/UL
NEUTROPHILS NFR BLD AUTO: 57.7 %
PLATELET # BLD AUTO: 524 K/UL
POTASSIUM SERPL-SCNC: 4.2 MMOL/L
PROT SERPL-MCNC: 7.5 G/DL
RBC # BLD: 5.21 M/UL
RBC # FLD: 18.9 %
SODIUM SERPL-SCNC: 140 MMOL/L
T4 FREE SERPL-MCNC: 1.4 NG/DL
TRIGL SERPL-MCNC: 60 MG/DL
TSH SERPL-ACNC: 1.21 UIU/ML
WBC # FLD AUTO: 7.99 K/UL

## 2018-05-08 ENCOUNTER — APPOINTMENT (OUTPATIENT)
Dept: OBGYN | Facility: CLINIC | Age: 33
End: 2018-05-08
Payer: COMMERCIAL

## 2018-05-08 VITALS
SYSTOLIC BLOOD PRESSURE: 111 MMHG | WEIGHT: 187 LBS | HEIGHT: 62 IN | BODY MASS INDEX: 34.41 KG/M2 | DIASTOLIC BLOOD PRESSURE: 75 MMHG

## 2018-05-08 DIAGNOSIS — O09.899 OTHER ABNORMAL FINDINGS ON ANTENATAL SCREENING OF MOTHER: ICD-10-CM

## 2018-05-08 DIAGNOSIS — O28.8 OTHER ABNORMAL FINDINGS ON ANTENATAL SCREENING OF MOTHER: ICD-10-CM

## 2018-05-08 PROCEDURE — 99395 PREV VISIT EST AGE 18-39: CPT

## 2018-05-08 RX ORDER — CHLORHEXIDINE GLUCONATE 4 %
325 (65 FE) LIQUID (ML) TOPICAL TWICE DAILY
Qty: 60 | Refills: 1 | Status: COMPLETED | COMMUNITY
Start: 2017-02-15 | End: 2018-05-08

## 2018-05-08 RX ORDER — DICLOFENAC SODIUM 75 MG/1
75 TABLET, DELAYED RELEASE ORAL
Qty: 60 | Refills: 0 | Status: COMPLETED | COMMUNITY
Start: 2017-11-27 | End: 2018-05-08

## 2018-05-09 LAB — HPV HIGH+LOW RISK DNA PNL CVX: NOT DETECTED

## 2018-05-14 LAB — CYTOLOGY CVX/VAG DOC THIN PREP: NORMAL

## 2018-05-15 LAB
ESTRADIOL SERPL-MCNC: 26 PG/ML
FSH SERPL-MCNC: 6.1 IU/L
PROLACTIN SERPL-MCNC: 23.5 NG/ML

## 2018-05-17 ENCOUNTER — CLINICAL ADVICE (OUTPATIENT)
Age: 33
End: 2018-05-17

## 2018-06-05 DIAGNOSIS — N91.2 AMENORRHEA, UNSPECIFIED: ICD-10-CM

## 2018-06-18 PROBLEM — N91.2 AMENORRHEA: Status: ACTIVE | Noted: 2018-06-18

## 2018-06-19 ENCOUNTER — CLINICAL ADVICE (OUTPATIENT)
Age: 33
End: 2018-06-19

## 2018-06-19 LAB
HCG SERPL-MCNC: 4 MIU/ML
PROGEST SERPL-MCNC: 0.2 NG/ML

## 2018-06-26 LAB
HCG SERPL-MCNC: <1 MIU/ML
PROGEST SERPL-MCNC: 0.2 NG/ML

## 2018-07-18 ENCOUNTER — APPOINTMENT (OUTPATIENT)
Dept: ORTHOPEDIC SURGERY | Facility: CLINIC | Age: 33
End: 2018-07-18
Payer: COMMERCIAL

## 2018-07-18 VITALS
HEART RATE: 89 BPM | BODY MASS INDEX: 35.87 KG/M2 | WEIGHT: 190 LBS | DIASTOLIC BLOOD PRESSURE: 72 MMHG | HEIGHT: 61 IN | SYSTOLIC BLOOD PRESSURE: 111 MMHG

## 2018-07-18 DIAGNOSIS — M54.5 LOW BACK PAIN: ICD-10-CM

## 2018-07-18 PROCEDURE — 99214 OFFICE O/P EST MOD 30 MIN: CPT

## 2018-07-18 PROCEDURE — 72100 X-RAY EXAM L-S SPINE 2/3 VWS: CPT

## 2018-09-04 NOTE — DISCHARGE NOTE OB - MEDICATION SUMMARY - MEDICATIONS TO TAKE
Department of Anesthesiology  Postprocedure Note    Patient: Juan C Lynch  MRN: [id number]  YOB: 1948  Date of evaluation: 9/4/2018  Time:  12:22 PM     Procedure Summary     Date:  09/04/18 Room / Location:  [facility] OR  / [facility] OR    Anesthesia Start:  2784 Anesthesia Stop:  8002    Procedure:  PARATHYROIDECTOMY LOWER, NIM MONITOR (Left Neck) Diagnosis:  (PARATHYROID ADENOMA )    Surgeon:  Osmin Stoll MD Responsible Provider:  Marie Canas MD    Anesthesia Type:  general ASA Status:  2          Anesthesia Type: general    Rich Phase I: Rich Score: 8    Rich Phase II:      Last vitals: Reviewed and per EMR flowsheets.        Anesthesia Post Evaluation    Patient location during evaluation: PACU  Patient participation: complete - patient participated  Level of consciousness: awake and alert  Pain score: 1  Airway patency: patent  Nausea & Vomiting: no nausea and no vomiting  Complications: no  Cardiovascular status: hemodynamically stable  Respiratory status: acceptable, room air and nasal cannula  Hydration status: stable I will START or STAY ON the medications listed below when I get home from the hospital:    ibuprofen 600 mg oral tablet  -- 1 tab(s) by mouth every 6 hours  -- Indication: For pain    acetaminophen 325 mg oral tablet  -- 3 tab(s) by mouth every 6 hours  -- Indication: For pain    lanolin topical ointment  -- 1 application on skin every 3 hours, As needed, Sore Nipples  -- Indication: For breastfeeding    Prenatal Multivitamins with Folic Acid 1 mg oral tablet  -- 1 tab(s) by mouth once a day  -- Indication: For breastfeeding    docusate sodium 100 mg oral capsule  -- 1 cap(s) by mouth 3 times a day, As needed, Stool Softening  -- Indication: For Constipation    ascorbic acid 250 mg oral tablet  -- 1 tab(s) by mouth 3 times a day  -- Indication: For anemia, iron supplements

## 2019-01-18 ENCOUNTER — EMERGENCY (EMERGENCY)
Facility: HOSPITAL | Age: 34
LOS: 1 days | Discharge: HOME | End: 2019-01-18
Attending: EMERGENCY MEDICINE | Admitting: EMERGENCY MEDICINE
Payer: COMMERCIAL

## 2019-01-18 VITALS
OXYGEN SATURATION: 100 % | SYSTOLIC BLOOD PRESSURE: 105 MMHG | TEMPERATURE: 100 F | DIASTOLIC BLOOD PRESSURE: 45 MMHG | HEART RATE: 111 BPM | RESPIRATION RATE: 18 BRPM

## 2019-01-18 VITALS
OXYGEN SATURATION: 100 % | DIASTOLIC BLOOD PRESSURE: 65 MMHG | SYSTOLIC BLOOD PRESSURE: 109 MMHG | RESPIRATION RATE: 17 BRPM | HEART RATE: 105 BPM | TEMPERATURE: 98 F

## 2019-01-18 DIAGNOSIS — K08.499 PARTIAL LOSS OF TEETH DUE TO OTHER SPECIFIED CAUSE, UNSPECIFIED CLASS: Chronic | ICD-10-CM

## 2019-01-18 LAB
ALBUMIN SERPL ELPH-MCNC: 4.3 G/DL — SIGNIFICANT CHANGE UP (ref 3.3–5)
ALP SERPL-CCNC: 72 U/L — SIGNIFICANT CHANGE UP (ref 40–120)
ALT FLD-CCNC: 11 U/L — SIGNIFICANT CHANGE UP (ref 4–33)
ANION GAP SERPL CALC-SCNC: 13 MMO/L — SIGNIFICANT CHANGE UP (ref 7–14)
ANISOCYTOSIS BLD QL: SIGNIFICANT CHANGE UP
AST SERPL-CCNC: 13 U/L — SIGNIFICANT CHANGE UP (ref 4–32)
BASOPHILS # BLD AUTO: 0.01 K/UL — SIGNIFICANT CHANGE UP (ref 0–0.2)
BASOPHILS NFR BLD AUTO: 0.1 % — SIGNIFICANT CHANGE UP (ref 0–2)
BASOPHILS NFR SPEC: 0.9 % — SIGNIFICANT CHANGE UP (ref 0–2)
BILIRUB SERPL-MCNC: 0.6 MG/DL — SIGNIFICANT CHANGE UP (ref 0.2–1.2)
BLASTS # FLD: 0 % — SIGNIFICANT CHANGE UP (ref 0–0)
BUN SERPL-MCNC: 13 MG/DL — SIGNIFICANT CHANGE UP (ref 7–23)
CALCIUM SERPL-MCNC: 8.9 MG/DL — SIGNIFICANT CHANGE UP (ref 8.4–10.5)
CHLORIDE SERPL-SCNC: 106 MMOL/L — SIGNIFICANT CHANGE UP (ref 98–107)
CO2 SERPL-SCNC: 21 MMOL/L — LOW (ref 22–31)
CREAT SERPL-MCNC: 0.65 MG/DL — SIGNIFICANT CHANGE UP (ref 0.5–1.3)
ELLIPTOCYTES BLD QL SMEAR: SLIGHT — SIGNIFICANT CHANGE UP
EOSINOPHIL # BLD AUTO: 0.03 K/UL — SIGNIFICANT CHANGE UP (ref 0–0.5)
EOSINOPHIL NFR BLD AUTO: 0.3 % — SIGNIFICANT CHANGE UP (ref 0–6)
EOSINOPHIL NFR FLD: 0 % — SIGNIFICANT CHANGE UP (ref 0–6)
GIANT PLATELETS BLD QL SMEAR: PRESENT — SIGNIFICANT CHANGE UP
GLUCOSE SERPL-MCNC: 122 MG/DL — HIGH (ref 70–99)
HCG SERPL-ACNC: < 5 MIU/ML — SIGNIFICANT CHANGE UP
HCT VFR BLD CALC: 33.3 % — LOW (ref 34.5–45)
HGB BLD-MCNC: 9.3 G/DL — LOW (ref 11.5–15.5)
HYPOCHROMIA BLD QL: SIGNIFICANT CHANGE UP
IMM GRANULOCYTES NFR BLD AUTO: 0.4 % — SIGNIFICANT CHANGE UP (ref 0–1.5)
LIDOCAIN IGE QN: 19.6 U/L — SIGNIFICANT CHANGE UP (ref 7–60)
LYMPHOCYTES # BLD AUTO: 0.54 K/UL — LOW (ref 1–3.3)
LYMPHOCYTES # BLD AUTO: 5 % — LOW (ref 13–44)
LYMPHOCYTES NFR SPEC AUTO: 7.7 % — LOW (ref 13–44)
MAGNESIUM SERPL-MCNC: 1.7 MG/DL — SIGNIFICANT CHANGE UP (ref 1.6–2.6)
MCHC RBC-ENTMCNC: 17 PG — LOW (ref 27–34)
MCHC RBC-ENTMCNC: 27.9 % — LOW (ref 32–36)
MCV RBC AUTO: 60.9 FL — LOW (ref 80–100)
METAMYELOCYTES # FLD: 0 % — SIGNIFICANT CHANGE UP (ref 0–1)
MICROCYTES BLD QL: SIGNIFICANT CHANGE UP
MONOCYTES # BLD AUTO: 0.61 K/UL — SIGNIFICANT CHANGE UP (ref 0–0.9)
MONOCYTES NFR BLD AUTO: 5.7 % — SIGNIFICANT CHANGE UP (ref 2–14)
MONOCYTES NFR BLD: 5.2 % — SIGNIFICANT CHANGE UP (ref 2–9)
MYELOCYTES NFR BLD: 0 % — SIGNIFICANT CHANGE UP (ref 0–0)
NEUTROPHIL AB SER-ACNC: 79.3 % — HIGH (ref 43–77)
NEUTROPHILS # BLD AUTO: 9.51 K/UL — HIGH (ref 1.8–7.4)
NEUTROPHILS NFR BLD AUTO: 88.5 % — HIGH (ref 43–77)
NEUTS BAND # BLD: 6.9 % — HIGH (ref 0–6)
NRBC # FLD: 0 K/UL — LOW (ref 25–125)
OTHER - HEMATOLOGY %: 0 — SIGNIFICANT CHANGE UP
PHOSPHATE SERPL-MCNC: 2.2 MG/DL — LOW (ref 2.5–4.5)
PLATELET # BLD AUTO: 480 K/UL — HIGH (ref 150–400)
PLATELET COUNT - ESTIMATE: NORMAL — SIGNIFICANT CHANGE UP
PMV BLD: 9.2 FL — SIGNIFICANT CHANGE UP (ref 7–13)
POIKILOCYTOSIS BLD QL AUTO: SIGNIFICANT CHANGE UP
POLYCHROMASIA BLD QL SMEAR: SIGNIFICANT CHANGE UP
POTASSIUM SERPL-MCNC: 3.6 MMOL/L — SIGNIFICANT CHANGE UP (ref 3.5–5.3)
POTASSIUM SERPL-SCNC: 3.6 MMOL/L — SIGNIFICANT CHANGE UP (ref 3.5–5.3)
PROMYELOCYTES # FLD: 0 % — SIGNIFICANT CHANGE UP (ref 0–0)
PROT SERPL-MCNC: 7.4 G/DL — SIGNIFICANT CHANGE UP (ref 6–8.3)
RBC # BLD: 5.47 M/UL — HIGH (ref 3.8–5.2)
RBC # FLD: 21.9 % — HIGH (ref 10.3–14.5)
SCHISTOCYTES BLD QL AUTO: SLIGHT — SIGNIFICANT CHANGE UP
SODIUM SERPL-SCNC: 140 MMOL/L — SIGNIFICANT CHANGE UP (ref 135–145)
TARGETS BLD QL SMEAR: SLIGHT — SIGNIFICANT CHANGE UP
VARIANT LYMPHS # BLD: 0 % — SIGNIFICANT CHANGE UP
WBC # BLD: 10.74 K/UL — HIGH (ref 3.8–10.5)
WBC # FLD AUTO: 10.74 K/UL — HIGH (ref 3.8–10.5)

## 2019-01-18 PROCEDURE — 99284 EMERGENCY DEPT VISIT MOD MDM: CPT | Mod: 25

## 2019-01-18 RX ORDER — FAMOTIDINE 10 MG/ML
20 INJECTION INTRAVENOUS ONCE
Qty: 0 | Refills: 0 | Status: COMPLETED | OUTPATIENT
Start: 2019-01-18 | End: 2019-01-18

## 2019-01-18 RX ORDER — ONDANSETRON 8 MG/1
4 TABLET, FILM COATED ORAL ONCE
Qty: 0 | Refills: 0 | Status: COMPLETED | OUTPATIENT
Start: 2019-01-18 | End: 2019-01-18

## 2019-01-18 RX ORDER — SODIUM CHLORIDE 9 MG/ML
1000 INJECTION INTRAMUSCULAR; INTRAVENOUS; SUBCUTANEOUS ONCE
Qty: 0 | Refills: 0 | Status: COMPLETED | OUTPATIENT
Start: 2019-01-18 | End: 2019-01-18

## 2019-01-18 RX ADMIN — SODIUM CHLORIDE 1000 MILLILITER(S): 9 INJECTION INTRAMUSCULAR; INTRAVENOUS; SUBCUTANEOUS at 05:58

## 2019-01-18 RX ADMIN — Medication 30 MILLILITER(S): at 05:58

## 2019-01-18 RX ADMIN — ONDANSETRON 4 MILLIGRAM(S): 8 TABLET, FILM COATED ORAL at 05:58

## 2019-01-18 RX ADMIN — FAMOTIDINE 20 MILLIGRAM(S): 10 INJECTION INTRAVENOUS at 05:58

## 2019-01-18 NOTE — ED PROVIDER NOTE - PHYSICAL EXAMINATION
Gen: No acute distress, alert, cooperative  Head: Normocephalic, Atraumatic  HEENT: PERRL, oral mucosa mildly dry, normal conjunctiva  Lung: CTAB, no respiratory distress, no crackles or wheezes  CV: rrr, no murmur  Abd: soft, very mild lower abdominal tenderness, ND, no rebound or guarding  MSK: No LE edema, no visible deformities  Neuro: No focal neurologic deficits  Skin: Warm and dry, no evidence of rash   Psych: normal affect, follows commands

## 2019-01-18 NOTE — ED PROVIDER NOTE - NSFOLLOWUPINSTRUCTIONS_ED_ALL_ED_FT
If you are feeling severely dehydrated and you cannot tolerate eating you need to come right back to the emergency room. If you have any severe increase in pain, fever, uncontrollable nausea vomiting, or inability to tolerate eating and drinking you need to come right back to the emergency room.     You should begin feeling better by tomorrow afternoon, make sure to keep drinking plenty of fluids.

## 2019-01-18 NOTE — ED PROVIDER NOTE - PROGRESS NOTE DETAILS
Resident: Aayush Peterson - Pending completion of IVF and re-evaluation for improvement of symptoms. Likely DC if pt feeling better. Resident: Aayush Peterson - Pt feeling better, attempt made to contact [facility] department of health received message that there is no  available and non of the options available were appropriate to leave a message. Resident: Aayush Peterson - Pt still having some diarrhea, she states that overall she is feeling better and would like to go home. She plans on getting Gatorade and trying to eat some chicken broth, she understands return precautions for severe dehydration.

## 2019-01-18 NOTE — ED ADULT NURSE NOTE - OBJECTIVE STATEMENT
received pt to room 18 aox3 in no apparent distress c/o diffuse abdominal pain that started last night after eating fast food. Pt endorses multiple episodes of n/v and diarrhea and weakness. Pt denies fever chills, chest pain, SOB or any other symptoms at this time. Breaths equal and unlabored, abdomen soft non tender, VSS, 22 G noted to L AC placed by EMS, positive blood return and flushes without difficulty. Labs sent, pt medicated as per order will continue to monitor

## 2019-01-18 NOTE — ED PROVIDER NOTE - ATTENDING CONTRIBUTION TO CARE
MD Rivero:  I performed a face to face bedside interview with patient regarding history of present illness, review of symptoms and past medical history. I completed an independent physical exam(documented below).  I have discussed patient's plan of care with resident.   I agree with note as stated above, having amended the EMR as needed to reflect my findings. I have discussed the assessment and plan of care.  This includes during the time I functioned as the attending physician for this patient.  PE:  Gen: Alert, mild distress  Head: NC, AT,  EOMI, normal lids/conjunctiva  ENT:  normal hearing, patent oropharynx without erythema/exudate  Neck: +supple, no tenderness/meningismus/JVD, +Trachea midline  Chest: no chest wall tenderness, equal chest rise  Pulm: Bilateral BS, normal resp effort, no wheeze/stridor/retractions  CV: tachy, no M/R/G, +dist pulses  Abd: +BS, soft, ND, mild diffuse ttp  Rectal: deferred  Mskel: no edema/erythema/cyanosis  Skin: no rash  MDM:  32yo F pmh of CS, c/o diffuse abd pain, n/v/d since 10pm, started less than an hour after eating at [employer] and pt's  (whom consumed same food) also experiencing same symptoms. Likely food borne illness. Labs, fluids, meds, reassess.  Resident to contact [facility] for possible food contamination/exposure at [employer].

## 2019-01-18 NOTE — ED ADULT TRIAGE NOTE - CHIEF COMPLAINT QUOTE
pt c/o vomitting and diarrhea x 3 hrs after eating wendys. given 4mg ivp via left ac 22g and 300cc fluids by ems with good result. endorses lower abdominal pain

## 2019-01-18 NOTE — ED PROVIDER NOTE - OBJECTIVE STATEMENT
34yo F hx  presenting with n/v/d since 10pm. Ate wendys, shortly after symptoms started. Still has symptoms, now vomit and diarrhea more watery.  had wendys and also has same symptoms after eating. Mild lower abdominal pain. Denies fevers, chills, cp, sob. Diarrhea and vomit Non-bloody.

## 2019-05-29 ENCOUNTER — RX RENEWAL (OUTPATIENT)
Age: 34
End: 2019-05-29

## 2019-05-31 ENCOUNTER — APPOINTMENT (OUTPATIENT)
Dept: OBGYN | Facility: CLINIC | Age: 34
End: 2019-05-31
Payer: COMMERCIAL

## 2019-05-31 VITALS
WEIGHT: 181.25 LBS | HEIGHT: 61 IN | SYSTOLIC BLOOD PRESSURE: 120 MMHG | DIASTOLIC BLOOD PRESSURE: 70 MMHG | BODY MASS INDEX: 34.22 KG/M2

## 2019-05-31 PROCEDURE — 81025 URINE PREGNANCY TEST: CPT

## 2019-05-31 PROCEDURE — 99214 OFFICE O/P EST MOD 30 MIN: CPT

## 2019-05-31 RX ORDER — CYCLOBENZAPRINE HYDROCHLORIDE 5 MG/1
5 TABLET, FILM COATED ORAL 3 TIMES DAILY
Qty: 90 | Refills: 1 | Status: COMPLETED | COMMUNITY
Start: 2018-07-18 | End: 2019-05-31

## 2019-05-31 RX ORDER — MELOXICAM 15 MG/1
15 TABLET ORAL
Qty: 30 | Refills: 1 | Status: COMPLETED | COMMUNITY
Start: 2018-07-18 | End: 2019-05-31

## 2019-05-31 NOTE — PHYSICAL EXAM
[Awake] : awake [Alert] : alert [Acute Distress] : no acute distress [Soft] : soft [Tender] : non tender [Oriented x3] : oriented to person, place, and time [Normal] : uterus [No Bleeding] : there was no active vaginal bleeding [Tenderness] : nontender [Enlarged ___ wks] : enlarged [unfilled] ~Uweeks [Uterine Adnexae] : were not tender and not enlarged [Adnexa Tenderness] : were not tender [Ovarian Mass (___ Cm)] : there were no adnexal masses

## 2019-05-31 NOTE — COUNSELING
[Nutrition] : nutrition [Exercise] : exercise [Vitamins/Supplements] : vitamins/supplements [STD (testing, results, tx)] : STD (testing, results, tx) [Vaccines] : vaccines [Weight Management] : weight management

## 2019-05-31 NOTE — HISTORY OF PRESENT ILLNESS
[Monogamous] : is monogamous [Patient travelled to an area with active Zika Virus transmission during pregnancy or 8 weeks before getting pregnant] : Patient stated she did not travel to an area with active Zika virus transmission during pregnancy or 8 weeks before getting pregnant [Patient had sex without a condom with a man who traveled to, or reside in, an area with active Zika Virus transmission during pregnancy] : Patient did not have sex without a condom with a man who traveled to, or reside in, an area with active Zika Virus transmission during pregnancy

## 2019-06-04 LAB
C TRACH RRNA SPEC QL NAA+PROBE: NOT DETECTED
N GONORRHOEA RRNA SPEC QL NAA+PROBE: NOT DETECTED
SOURCE AMPLIFICATION: NORMAL

## 2019-06-07 ENCOUNTER — CHART COPY (OUTPATIENT)
Age: 34
End: 2019-06-07

## 2019-06-25 ENCOUNTER — LABORATORY RESULT (OUTPATIENT)
Age: 34
End: 2019-06-25

## 2019-06-25 ENCOUNTER — APPOINTMENT (OUTPATIENT)
Dept: OBGYN | Facility: CLINIC | Age: 34
End: 2019-06-25
Payer: COMMERCIAL

## 2019-06-25 ENCOUNTER — NON-APPOINTMENT (OUTPATIENT)
Age: 34
End: 2019-06-25

## 2019-06-25 VITALS
SYSTOLIC BLOOD PRESSURE: 118 MMHG | DIASTOLIC BLOOD PRESSURE: 64 MMHG | WEIGHT: 181.25 LBS | BODY MASS INDEX: 34.22 KG/M2 | HEIGHT: 61 IN

## 2019-06-25 LAB
ALBUMIN SERPL ELPH-MCNC: 4.2 G/DL
ALP BLD-CCNC: 78 U/L
ALT SERPL-CCNC: 16 U/L
ANION GAP SERPL CALC-SCNC: 15 MMOL/L
AST SERPL-CCNC: 17 U/L
BILIRUB SERPL-MCNC: 0.3 MG/DL
BUN SERPL-MCNC: 5 MG/DL
CALCIUM SERPL-MCNC: 9.9 MG/DL
CHLORIDE SERPL-SCNC: 101 MMOL/L
CO2 SERPL-SCNC: 20 MMOL/L
CREAT SERPL-MCNC: 0.52 MG/DL
GLUCOSE SERPL-MCNC: 92 MG/DL
POTASSIUM SERPL-SCNC: 4.3 MMOL/L
PROT SERPL-MCNC: 7.1 G/DL
SODIUM SERPL-SCNC: 136 MMOL/L
TSH SERPL-ACNC: 0.09 UIU/ML

## 2019-06-25 PROCEDURE — 0501F PRENATAL FLOW SHEET: CPT

## 2019-06-26 LAB
ABO + RH PNL BLD: NORMAL
BACTERIA UR CULT: NORMAL
BASOPHILS # BLD AUTO: 0.03 K/UL
BASOPHILS NFR BLD AUTO: 0.3 %
BLD GP AB SCN SERPL QL: NORMAL
CMV IGG SERPL QL: <0.2 U/ML
CMV IGG SERPL-IMP: NEGATIVE
EOSINOPHIL # BLD AUTO: 0.05 K/UL
EOSINOPHIL NFR BLD AUTO: 0.5 %
ESTIMATED AVERAGE GLUCOSE: 108 MG/DL
HBA1C MFR BLD HPLC: 5.4 %
HBV SURFACE AG SER QL: NONREACTIVE
HCT VFR BLD CALC: 33 %
HCV AB SER QL: NONREACTIVE
HCV S/CO RATIO: 0.1 S/CO
HGB BLD-MCNC: 9.5 G/DL
HIV1+2 AB SPEC QL IA.RAPID: NONREACTIVE
IMM GRANULOCYTES NFR BLD AUTO: 0.3 %
LEAD BLD-MCNC: <1 UG/DL
LYMPHOCYTES # BLD AUTO: 2.91 K/UL
LYMPHOCYTES NFR BLD AUTO: 28.8 %
MAN DIFF?: NORMAL
MCHC RBC-ENTMCNC: 18 PG
MCHC RBC-ENTMCNC: 28.8 GM/DL
MCV RBC AUTO: 62.4 FL
MEV IGG FLD QL IA: 50.1 AU/ML
MEV IGG+IGM SER-IMP: POSITIVE
MONOCYTES # BLD AUTO: 0.86 K/UL
MONOCYTES NFR BLD AUTO: 8.5 %
NEUTROPHILS # BLD AUTO: 6.24 K/UL
NEUTROPHILS NFR BLD AUTO: 61.6 %
PLATELET # BLD AUTO: 473 K/UL
RBC # BLD: 5.29 M/UL
RBC # FLD: 21.3 %
RUBV IGG FLD-ACNC: 3.6 INDEX
RUBV IGG SER-IMP: POSITIVE
T PALLIDUM AB SER QL IA: NEGATIVE
VZV AB TITR SER: POSITIVE
VZV IGG SER IF-ACNC: 975.6 INDEX
WBC # FLD AUTO: 10.12 K/UL

## 2019-06-27 ENCOUNTER — RESULT REVIEW (OUTPATIENT)
Age: 34
End: 2019-06-27

## 2019-06-28 ENCOUNTER — ASOB RESULT (OUTPATIENT)
Age: 34
End: 2019-06-28

## 2019-06-28 ENCOUNTER — APPOINTMENT (OUTPATIENT)
Dept: ANTEPARTUM | Facility: CLINIC | Age: 34
End: 2019-06-28
Payer: COMMERCIAL

## 2019-06-28 ENCOUNTER — LABORATORY RESULT (OUTPATIENT)
Age: 34
End: 2019-06-28

## 2019-06-28 PROCEDURE — 76801 OB US < 14 WKS SINGLE FETUS: CPT

## 2019-06-28 PROCEDURE — 76813 OB US NUCHAL MEAS 1 GEST: CPT

## 2019-06-28 PROCEDURE — 36416 COLLJ CAPILLARY BLOOD SPEC: CPT

## 2019-06-30 LAB
B19V IGG SER QL IA: 6 INDEX
B19V IGG+IGM SER-IMP: NORMAL
B19V IGG+IGM SER-IMP: POSITIVE
B19V IGM FLD-ACNC: 0.2
B19V IGM SER-ACNC: NEGATIVE
BASOPHILS # BLD AUTO: 0.02 K/UL
BASOPHILS NFR BLD AUTO: 0.2 %
EOSINOPHIL # BLD AUTO: 0.06 K/UL
EOSINOPHIL NFR BLD AUTO: 0.6 %
FERRITIN SERPL-MCNC: 7 NG/ML
FOLATE SERPL-MCNC: 19.7 NG/ML
HCT VFR BLD CALC: 32.5 %
HGB BLD-MCNC: 9.3 G/DL
IMM GRANULOCYTES NFR BLD AUTO: 0.2 %
IRON SATN MFR SERPL: 6 %
IRON SERPL-MCNC: 25 UG/DL
LYMPHOCYTES # BLD AUTO: 2.61 K/UL
LYMPHOCYTES NFR BLD AUTO: 27.2 %
MAN DIFF?: NORMAL
MCHC RBC-ENTMCNC: 18.2 PG
MCHC RBC-ENTMCNC: 28.6 GM/DL
MCV RBC AUTO: 63.5 FL
MONOCYTES # BLD AUTO: 0.82 K/UL
MONOCYTES NFR BLD AUTO: 8.5 %
NEUTROPHILS # BLD AUTO: 6.07 K/UL
NEUTROPHILS NFR BLD AUTO: 63.3 %
PLATELET # BLD AUTO: 466 K/UL
RBC # BLD: 5.12 M/UL
RBC # FLD: 22.5 %
TIBC SERPL-MCNC: 426 UG/DL
UIBC SERPL-MCNC: 401 UG/DL
VIT B12 SERPL-MCNC: 265 PG/ML
WBC # FLD AUTO: 9.6 K/UL

## 2019-07-01 ENCOUNTER — OTHER (OUTPATIENT)
Age: 34
End: 2019-07-01

## 2019-07-01 LAB
CLARI ADDITIONAL INFO: NORMAL
CLARI CHROMOSOME 13: NORMAL
CLARI CHROMOSOME 18: NORMAL
CLARI CHROMOSOME 21: NORMAL
CLARI SEX CHROMOSOMES: NORMAL
CLARITEST NIPT: NORMAL

## 2019-07-09 ENCOUNTER — CHART COPY (OUTPATIENT)
Age: 34
End: 2019-07-09

## 2019-07-24 ENCOUNTER — APPOINTMENT (OUTPATIENT)
Dept: OBGYN | Facility: CLINIC | Age: 34
End: 2019-07-24
Payer: COMMERCIAL

## 2019-07-24 ENCOUNTER — NON-APPOINTMENT (OUTPATIENT)
Age: 34
End: 2019-07-24

## 2019-07-24 ENCOUNTER — LABORATORY RESULT (OUTPATIENT)
Age: 34
End: 2019-07-24

## 2019-07-24 VITALS
BODY MASS INDEX: 34.43 KG/M2 | DIASTOLIC BLOOD PRESSURE: 76 MMHG | HEIGHT: 61 IN | WEIGHT: 182.38 LBS | SYSTOLIC BLOOD PRESSURE: 114 MMHG

## 2019-07-24 PROCEDURE — 0502F SUBSEQUENT PRENATAL CARE: CPT

## 2019-07-25 LAB
BASOPHILS # BLD AUTO: 0.03 K/UL
BASOPHILS NFR BLD AUTO: 0.3 %
EOSINOPHIL # BLD AUTO: 0.07 K/UL
EOSINOPHIL NFR BLD AUTO: 0.6 %
HCT VFR BLD CALC: 36.2 %
HGB BLD-MCNC: 10.2 G/DL
IMM GRANULOCYTES NFR BLD AUTO: 0.4 %
LYMPHOCYTES # BLD AUTO: 3.39 K/UL
LYMPHOCYTES NFR BLD AUTO: 30.1 %
MAN DIFF?: NORMAL
MCHC RBC-ENTMCNC: 19 PG
MCHC RBC-ENTMCNC: 28.2 GM/DL
MCV RBC AUTO: 67.5 FL
MONOCYTES # BLD AUTO: 1.16 K/UL
MONOCYTES NFR BLD AUTO: 10.3 %
NEUTROPHILS # BLD AUTO: 6.59 K/UL
NEUTROPHILS NFR BLD AUTO: 58.3 %
PLATELET # BLD AUTO: 408 K/UL
RBC # BLD: 5.36 M/UL
RBC # FLD: 24.7 %
WBC # FLD AUTO: 11.28 K/UL

## 2019-07-28 ENCOUNTER — NON-APPOINTMENT (OUTPATIENT)
Age: 34
End: 2019-07-28

## 2019-07-28 LAB
1ST TRIMESTER DATA: NORMAL
2ND TRIMESTER DATA: NORMAL
AFP PNL SERPL: NORMAL
AFP SERPL-ACNC: NORMAL
AFP SERPL-ACNC: NORMAL
B-HCG FREE SERPL-MCNC: NORMAL
CLINICAL BIOCHEMIST REVIEW: NORMAL
FREE BETA HCG 1ST TRIMESTER: NORMAL
INHIBIN A SERPL-MCNC: NORMAL
NOTES NTD: NORMAL
NT: NORMAL
PAPP-A SERPL-ACNC: NORMAL
U ESTRIOL SERPL-SCNC: NORMAL

## 2019-08-15 ENCOUNTER — APPOINTMENT (OUTPATIENT)
Dept: OBGYN | Facility: CLINIC | Age: 34
End: 2019-08-15
Payer: COMMERCIAL

## 2019-08-15 VITALS — DIASTOLIC BLOOD PRESSURE: 80 MMHG | BODY MASS INDEX: 35.52 KG/M2 | WEIGHT: 188 LBS | SYSTOLIC BLOOD PRESSURE: 118 MMHG

## 2019-08-15 DIAGNOSIS — Z87.898 PERSONAL HISTORY OF OTHER SPECIFIED CONDITIONS: ICD-10-CM

## 2019-08-15 PROCEDURE — 0502F SUBSEQUENT PRENATAL CARE: CPT

## 2019-08-19 ENCOUNTER — NON-APPOINTMENT (OUTPATIENT)
Age: 34
End: 2019-08-19

## 2019-08-27 ENCOUNTER — APPOINTMENT (OUTPATIENT)
Dept: ANTEPARTUM | Facility: CLINIC | Age: 34
End: 2019-08-27
Payer: COMMERCIAL

## 2019-08-27 ENCOUNTER — ASOB RESULT (OUTPATIENT)
Age: 34
End: 2019-08-27

## 2019-08-27 PROCEDURE — 76817 TRANSVAGINAL US OBSTETRIC: CPT

## 2019-08-27 PROCEDURE — 76811 OB US DETAILED SNGL FETUS: CPT

## 2019-09-11 ENCOUNTER — LABORATORY RESULT (OUTPATIENT)
Age: 34
End: 2019-09-11

## 2019-09-11 ENCOUNTER — APPOINTMENT (OUTPATIENT)
Dept: ENDOCRINOLOGY | Facility: CLINIC | Age: 34
End: 2019-09-11
Payer: COMMERCIAL

## 2019-09-11 VITALS
SYSTOLIC BLOOD PRESSURE: 110 MMHG | OXYGEN SATURATION: 99 % | HEIGHT: 61 IN | BODY MASS INDEX: 36.3 KG/M2 | HEART RATE: 98 BPM | DIASTOLIC BLOOD PRESSURE: 60 MMHG | WEIGHT: 192.25 LBS

## 2019-09-11 DIAGNOSIS — R73.03 PREDIABETES.: ICD-10-CM

## 2019-09-11 DIAGNOSIS — E04.1 NONTOXIC SINGLE THYROID NODULE: ICD-10-CM

## 2019-09-11 PROCEDURE — 99214 OFFICE O/P EST MOD 30 MIN: CPT

## 2019-09-11 NOTE — ASSESSMENT
[Carbohydrate Consistent Diet] : carbohydrate consistent diet [FreeTextEntry1] : 33 y.o. female with h/o thyroid nodules and prediabetes.\par 1. Thyroid nodules- Patient appears euthyroid. Thyroid ultrasound performed today shows homogenous gland with stable left 0.6 cm and 0.5 cm cysts and with mild increase in right hypoechoic nodule to 0.7 cm and 0.5 cm cystic nodule. Will continue to monitor and recommend repeat ultrasound in 6 months. Will check TFTs. Suspect TSH suppression was related to pregnancy and hyperemesis. \par 2. Prediabetes- Encouraged carbohydrate consistent diet and exercise. Normal Hba1c. Will follow up with OB/GYN for her OGTT. \par \par Follow up in 6 months

## 2019-09-11 NOTE — PHYSICAL EXAM
[Alert] : alert [No Acute Distress] : no acute distress [Normal Sclera/Conjunctiva] : normal sclera/conjunctiva [EOMI] : extra ocular movement intact [Supple] : the neck was supple [No LAD] : no lymphadenopathy [Clear to Auscultation] : lungs were clear to auscultation bilaterally [No Accessory Muscle Use] : no accessory muscle use [Normal S1, S2] : normal S1 and S2 [Regular Rhythm] : with a regular rhythm [No Edema] : there was no peripheral edema [Normal Bowel Sounds] : normal bowel sounds [Normal] : normal and non tender [No Clubbing, Cyanosis] : no clubbing  or cyanosis of the fingernails [No Rash] : no rash [Normal Reflexes] : deep tendon reflexes were 2+ and symmetric [Normal Affect] : the affect was normal [Normal Mood] : the mood was normal [de-identified] : prominent gland [de-identified] : gravid

## 2019-09-11 NOTE — HISTORY OF PRESENT ILLNESS
[FreeTextEntry1] : 33 y.o. female with h/o prediabetes and thyroid nodules here follow up visit. \par \par Was diagnosed with thalassemia trait. Currently 6 months pregnant. Eating more with pregnancy and has gained weight. Was drinking juice. Not exercising at home. Walking now. No GDM with first pregnancy. \par \par In regards to thyroid nodules, no neck complaints. Feeling good. No hair or skin complaints. Normal bowel movements. No change in body temperature. Thyroid ultrasound in 2017 showed stable cystic nodules. Thyroid ultrasound performed in April 2018 shows homogenous gland with stable left 0.63 cm and 0.45 cm cysts and stable right 0.5 cm hypoechoic nodule and 0.4 cm cystic nodule.  Had suppressed TSH in June 2019; however reports severe hyperemesis. \par

## 2019-09-11 NOTE — REVIEW OF SYSTEMS
[Recent Weight Gain (___ Lbs)] : recent [unfilled] ~Ulb weight gain [Negative] : Heme/Lymph [Polyuria] : no polyuria [Joint Pain] : no joint pain [Hair Loss] : no hair loss [Polydipsia] : no polydipsia [Cold Intolerance] : cold tolerant [Heat Intolerance] : heat tolerant

## 2019-09-12 ENCOUNTER — CLINICAL ADVICE (OUTPATIENT)
Age: 34
End: 2019-09-12

## 2019-09-12 ENCOUNTER — OUTPATIENT (OUTPATIENT)
Dept: OUTPATIENT SERVICES | Facility: HOSPITAL | Age: 34
LOS: 1 days | End: 2019-09-12
Payer: COMMERCIAL

## 2019-09-12 ENCOUNTER — APPOINTMENT (OUTPATIENT)
Dept: ANTEPARTUM | Facility: CLINIC | Age: 34
End: 2019-09-12

## 2019-09-12 ENCOUNTER — ASOB RESULT (OUTPATIENT)
Age: 34
End: 2019-09-12

## 2019-09-12 DIAGNOSIS — K08.499 PARTIAL LOSS OF TEETH DUE TO OTHER SPECIFIED CAUSE, UNSPECIFIED CLASS: Chronic | ICD-10-CM

## 2019-09-12 DIAGNOSIS — Z3A.00 WEEKS OF GESTATION OF PREGNANCY NOT SPECIFIED: ICD-10-CM

## 2019-09-12 DIAGNOSIS — O26.899 OTHER SPECIFIED PREGNANCY RELATED CONDITIONS, UNSPECIFIED TRIMESTER: ICD-10-CM

## 2019-09-12 LAB
T3RU NFR SERPL: 1.3 TBI
T4 SERPL-MCNC: 9.4 UG/DL
TSH SERPL-ACNC: 1 UIU/ML

## 2019-09-12 PROCEDURE — 59025 FETAL NON-STRESS TEST: CPT

## 2019-09-12 PROCEDURE — 76815 OB US LIMITED FETUS(S): CPT | Mod: 26

## 2019-09-12 PROCEDURE — 76815 OB US LIMITED FETUS(S): CPT

## 2019-09-12 PROCEDURE — G0463: CPT

## 2019-09-13 ENCOUNTER — APPOINTMENT (OUTPATIENT)
Dept: OBGYN | Facility: CLINIC | Age: 34
End: 2019-09-13

## 2019-09-19 ENCOUNTER — APPOINTMENT (OUTPATIENT)
Dept: OBGYN | Facility: CLINIC | Age: 34
End: 2019-09-19
Payer: COMMERCIAL

## 2019-09-19 VITALS
BODY MASS INDEX: 36.25 KG/M2 | DIASTOLIC BLOOD PRESSURE: 72 MMHG | SYSTOLIC BLOOD PRESSURE: 112 MMHG | HEIGHT: 61 IN | WEIGHT: 192 LBS

## 2019-09-19 PROCEDURE — 0502F SUBSEQUENT PRENATAL CARE: CPT

## 2019-09-27 ENCOUNTER — APPOINTMENT (OUTPATIENT)
Dept: OBGYN | Facility: CLINIC | Age: 34
End: 2019-09-27

## 2019-09-30 ENCOUNTER — TRANSCRIPTION ENCOUNTER (OUTPATIENT)
Age: 34
End: 2019-09-30

## 2019-09-30 ENCOUNTER — RESULT REVIEW (OUTPATIENT)
Age: 34
End: 2019-09-30

## 2019-09-30 LAB — GLUCOSE 1H P 50 G GLC PO SERPL-MCNC: 89 MG/DL

## 2019-10-14 ENCOUNTER — MED ADMIN CHARGE (OUTPATIENT)
Age: 34
End: 2019-10-14

## 2019-10-14 ENCOUNTER — APPOINTMENT (OUTPATIENT)
Dept: OBGYN | Facility: CLINIC | Age: 34
End: 2019-10-14
Payer: COMMERCIAL

## 2019-10-14 VITALS
HEIGHT: 61 IN | SYSTOLIC BLOOD PRESSURE: 122 MMHG | WEIGHT: 194.5 LBS | BODY MASS INDEX: 36.72 KG/M2 | DIASTOLIC BLOOD PRESSURE: 60 MMHG

## 2019-10-14 DIAGNOSIS — O34.219 MATERNAL CARE FOR UNSPECIFIED TYPE SCAR FROM PREVIOUS CESAREAN DELIVERY: ICD-10-CM

## 2019-10-14 PROCEDURE — 0502F SUBSEQUENT PRENATAL CARE: CPT

## 2019-10-14 PROCEDURE — 90715 TDAP VACCINE 7 YRS/> IM: CPT

## 2019-10-14 PROCEDURE — 90471 IMMUNIZATION ADMIN: CPT

## 2019-10-15 LAB
BASOPHILS # BLD AUTO: 0.02 K/UL
BASOPHILS NFR BLD AUTO: 0.2 %
EOSINOPHIL # BLD AUTO: 0.07 K/UL
EOSINOPHIL NFR BLD AUTO: 0.7 %
HCT VFR BLD CALC: 35 %
HGB BLD-MCNC: 10.7 G/DL
HIV1+2 AB SPEC QL IA.RAPID: NONREACTIVE
IMM GRANULOCYTES NFR BLD AUTO: 0.7 %
LYMPHOCYTES # BLD AUTO: 2.69 K/UL
LYMPHOCYTES NFR BLD AUTO: 25.3 %
MAN DIFF?: NORMAL
MCHC RBC-ENTMCNC: 21.1 PG
MCHC RBC-ENTMCNC: 30.6 GM/DL
MCV RBC AUTO: 68.9 FL
MONOCYTES # BLD AUTO: 0.94 K/UL
MONOCYTES NFR BLD AUTO: 8.8 %
NEUTROPHILS # BLD AUTO: 6.85 K/UL
NEUTROPHILS NFR BLD AUTO: 64.3 %
PLATELET # BLD AUTO: 344 K/UL
RBC # BLD: 5.08 M/UL
RBC # FLD: 16.7 %
WBC # FLD AUTO: 10.64 K/UL

## 2019-10-30 ENCOUNTER — ASOB RESULT (OUTPATIENT)
Age: 34
End: 2019-10-30

## 2019-10-30 ENCOUNTER — APPOINTMENT (OUTPATIENT)
Dept: ANTEPARTUM | Facility: CLINIC | Age: 34
End: 2019-10-30
Payer: COMMERCIAL

## 2019-10-30 PROCEDURE — 76816 OB US FOLLOW-UP PER FETUS: CPT

## 2019-11-01 ENCOUNTER — APPOINTMENT (OUTPATIENT)
Dept: OBGYN | Facility: CLINIC | Age: 34
End: 2019-11-01
Payer: COMMERCIAL

## 2019-11-01 VITALS
DIASTOLIC BLOOD PRESSURE: 62 MMHG | SYSTOLIC BLOOD PRESSURE: 116 MMHG | WEIGHT: 193 LBS | HEIGHT: 61 IN | BODY MASS INDEX: 36.44 KG/M2

## 2019-11-01 DIAGNOSIS — Z87.42 PERSONAL HISTORY OF OTHER DISEASES OF THE FEMALE GENITAL TRACT: ICD-10-CM

## 2019-11-01 DIAGNOSIS — Z01.419 ENCOUNTER FOR GYNECOLOGICAL EXAMINATION (GENERAL) (ROUTINE) W/OUT ABNORMAL FINDINGS: ICD-10-CM

## 2019-11-01 PROCEDURE — 0502F SUBSEQUENT PRENATAL CARE: CPT

## 2019-11-05 PROBLEM — D56.3 ALPHA THALASSEMIA TRAIT: Status: ACTIVE | Noted: 2017-02-10

## 2019-11-11 ENCOUNTER — APPOINTMENT (OUTPATIENT)
Dept: OBGYN | Facility: CLINIC | Age: 34
End: 2019-11-11
Payer: COMMERCIAL

## 2019-11-11 ENCOUNTER — NON-APPOINTMENT (OUTPATIENT)
Age: 34
End: 2019-11-11

## 2019-11-11 VITALS
HEIGHT: 61 IN | DIASTOLIC BLOOD PRESSURE: 82 MMHG | WEIGHT: 193 LBS | BODY MASS INDEX: 36.44 KG/M2 | SYSTOLIC BLOOD PRESSURE: 122 MMHG

## 2019-11-11 PROCEDURE — 0502F SUBSEQUENT PRENATAL CARE: CPT

## 2019-11-21 ENCOUNTER — APPOINTMENT (OUTPATIENT)
Dept: OBGYN | Facility: CLINIC | Age: 34
End: 2019-11-21
Payer: COMMERCIAL

## 2019-11-21 ENCOUNTER — NON-APPOINTMENT (OUTPATIENT)
Age: 34
End: 2019-11-21

## 2019-11-21 VITALS
WEIGHT: 195 LBS | DIASTOLIC BLOOD PRESSURE: 79 MMHG | HEIGHT: 61 IN | BODY MASS INDEX: 36.82 KG/M2 | SYSTOLIC BLOOD PRESSURE: 123 MMHG

## 2019-11-21 DIAGNOSIS — R51 HEADACHE: ICD-10-CM

## 2019-11-21 PROCEDURE — 0502F SUBSEQUENT PRENATAL CARE: CPT

## 2019-11-21 RX ORDER — ACETAMINOPHEN 325 MG/1
325 TABLET, FILM COATED ORAL
Refills: 0 | Status: ACTIVE | COMMUNITY
Start: 2019-11-21

## 2019-11-22 LAB
ALBUMIN SERPL ELPH-MCNC: 3.5 G/DL
ALP BLD-CCNC: 96 U/L
ALT SERPL-CCNC: 16 U/L
ANION GAP SERPL CALC-SCNC: 12 MMOL/L
AST SERPL-CCNC: 17 U/L
BASOPHILS # BLD AUTO: 0.02 K/UL
BASOPHILS NFR BLD AUTO: 0.2 %
BILIRUB SERPL-MCNC: 0.4 MG/DL
BUN SERPL-MCNC: 3 MG/DL
CALCIUM SERPL-MCNC: 9.7 MG/DL
CHLORIDE SERPL-SCNC: 105 MMOL/L
CO2 SERPL-SCNC: 21 MMOL/L
CREAT SERPL-MCNC: 0.49 MG/DL
CREAT SPEC-SCNC: 138 MG/DL
CREAT/PROT UR: 0.2 RATIO
EOSINOPHIL # BLD AUTO: 0.04 K/UL
EOSINOPHIL NFR BLD AUTO: 0.4 %
FIBRINOGEN PPP COAG.DERIVED-MCNC: 643 MG/DL
GLUCOSE SERPL-MCNC: 90 MG/DL
HCT VFR BLD CALC: 38.5 %
HGB BLD-MCNC: 11.5 G/DL
IMM GRANULOCYTES NFR BLD AUTO: 0.6 %
LDH SERPL-CCNC: 144 U/L
LYMPHOCYTES # BLD AUTO: 2.35 K/UL
LYMPHOCYTES NFR BLD AUTO: 23.6 %
MAN DIFF?: NORMAL
MCHC RBC-ENTMCNC: 21.4 PG
MCHC RBC-ENTMCNC: 29.9 GM/DL
MCV RBC AUTO: 71.7 FL
MONOCYTES # BLD AUTO: 0.87 K/UL
MONOCYTES NFR BLD AUTO: 8.7 %
NEUTROPHILS # BLD AUTO: 6.61 K/UL
NEUTROPHILS NFR BLD AUTO: 66.5 %
PLATELET # BLD AUTO: 367 K/UL
POTASSIUM SERPL-SCNC: 4 MMOL/L
PROT SERPL-MCNC: 6.2 G/DL
PROT UR-MCNC: 21 MG/DL
RBC # BLD: 5.37 M/UL
RBC # FLD: 17.2 %
SODIUM SERPL-SCNC: 138 MMOL/L
URATE SERPL-MCNC: 3.9 MG/DL
WBC # FLD AUTO: 9.95 K/UL

## 2019-11-26 ENCOUNTER — APPOINTMENT (OUTPATIENT)
Dept: OBGYN | Facility: CLINIC | Age: 34
End: 2019-11-26
Payer: COMMERCIAL

## 2019-11-26 ENCOUNTER — NON-APPOINTMENT (OUTPATIENT)
Age: 34
End: 2019-11-26

## 2019-11-26 VITALS
HEIGHT: 61 IN | WEIGHT: 196 LBS | BODY MASS INDEX: 37 KG/M2 | DIASTOLIC BLOOD PRESSURE: 78 MMHG | SYSTOLIC BLOOD PRESSURE: 121 MMHG

## 2019-11-26 PROCEDURE — 0502F SUBSEQUENT PRENATAL CARE: CPT

## 2019-11-27 ENCOUNTER — APPOINTMENT (OUTPATIENT)
Dept: ANTEPARTUM | Facility: CLINIC | Age: 34
End: 2019-11-27
Payer: COMMERCIAL

## 2019-11-27 ENCOUNTER — ASOB RESULT (OUTPATIENT)
Age: 34
End: 2019-11-27

## 2019-11-27 ENCOUNTER — OTHER (OUTPATIENT)
Age: 34
End: 2019-11-27

## 2019-11-27 PROCEDURE — 76816 OB US FOLLOW-UP PER FETUS: CPT

## 2019-12-01 ENCOUNTER — TRANSCRIPTION ENCOUNTER (OUTPATIENT)
Age: 34
End: 2019-12-01

## 2019-12-02 ENCOUNTER — MESSAGE (OUTPATIENT)
Age: 34
End: 2019-12-02

## 2019-12-12 ENCOUNTER — APPOINTMENT (OUTPATIENT)
Dept: OBGYN | Facility: CLINIC | Age: 34
End: 2019-12-12
Payer: COMMERCIAL

## 2019-12-12 ENCOUNTER — NON-APPOINTMENT (OUTPATIENT)
Age: 34
End: 2019-12-12

## 2019-12-12 VITALS
HEIGHT: 61 IN | WEIGHT: 199 LBS | DIASTOLIC BLOOD PRESSURE: 70 MMHG | BODY MASS INDEX: 37.57 KG/M2 | SYSTOLIC BLOOD PRESSURE: 124 MMHG

## 2019-12-12 PROCEDURE — 0502F SUBSEQUENT PRENATAL CARE: CPT

## 2019-12-12 RX ORDER — AMOXICILLIN 875 MG/1
875 TABLET, FILM COATED ORAL
Qty: 20 | Refills: 0 | Status: COMPLETED | COMMUNITY
Start: 2019-12-01

## 2019-12-18 ENCOUNTER — NON-APPOINTMENT (OUTPATIENT)
Age: 34
End: 2019-12-18

## 2019-12-18 ENCOUNTER — APPOINTMENT (OUTPATIENT)
Dept: OBGYN | Facility: CLINIC | Age: 34
End: 2019-12-18
Payer: COMMERCIAL

## 2019-12-18 VITALS
DIASTOLIC BLOOD PRESSURE: 70 MMHG | SYSTOLIC BLOOD PRESSURE: 120 MMHG | HEIGHT: 61 IN | BODY MASS INDEX: 37.38 KG/M2 | WEIGHT: 198 LBS

## 2019-12-18 DIAGNOSIS — Z87.42 PERSONAL HISTORY OF OTHER DISEASES OF THE FEMALE GENITAL TRACT: ICD-10-CM

## 2019-12-18 DIAGNOSIS — Z34.92 ENCOUNTER FOR SUPERVISION OF NORMAL PREGNANCY, UNSPECIFIED, SECOND TRIMESTER: ICD-10-CM

## 2019-12-18 DIAGNOSIS — Z34.81 ENCOUNTER FOR SUPERVISION OF OTHER NORMAL PREGNANCY, FIRST TRIMESTER: ICD-10-CM

## 2019-12-18 DIAGNOSIS — N92.6 IRREGULAR MENSTRUATION, UNSPECIFIED: ICD-10-CM

## 2019-12-18 PROCEDURE — 0502F SUBSEQUENT PRENATAL CARE: CPT

## 2019-12-19 ENCOUNTER — OUTPATIENT (OUTPATIENT)
Dept: OUTPATIENT SERVICES | Facility: HOSPITAL | Age: 34
LOS: 1 days | End: 2019-12-19
Payer: COMMERCIAL

## 2019-12-19 DIAGNOSIS — K08.499 PARTIAL LOSS OF TEETH DUE TO OTHER SPECIFIED CAUSE, UNSPECIFIED CLASS: Chronic | ICD-10-CM

## 2019-12-19 DIAGNOSIS — Z3A.00 WEEKS OF GESTATION OF PREGNANCY NOT SPECIFIED: ICD-10-CM

## 2019-12-19 DIAGNOSIS — O26.899 OTHER SPECIFIED PREGNANCY RELATED CONDITIONS, UNSPECIFIED TRIMESTER: ICD-10-CM

## 2019-12-20 ENCOUNTER — ASOB RESULT (OUTPATIENT)
Age: 34
End: 2019-12-20

## 2019-12-20 ENCOUNTER — APPOINTMENT (OUTPATIENT)
Dept: ANTEPARTUM | Facility: CLINIC | Age: 34
End: 2019-12-20
Payer: COMMERCIAL

## 2019-12-20 LAB
ALBUMIN SERPL ELPH-MCNC: 3.4 G/DL — SIGNIFICANT CHANGE UP (ref 3.3–5)
ALP SERPL-CCNC: 136 U/L — HIGH (ref 40–120)
ALT FLD-CCNC: 36 U/L — SIGNIFICANT CHANGE UP (ref 10–45)
ANION GAP SERPL CALC-SCNC: 19 MMOL/L — HIGH (ref 5–17)
APPEARANCE UR: CLEAR — SIGNIFICANT CHANGE UP
APPEARANCE UR: CLEAR — SIGNIFICANT CHANGE UP
AST SERPL-CCNC: 39 U/L — SIGNIFICANT CHANGE UP (ref 10–40)
BACTERIA # UR AUTO: NEGATIVE — SIGNIFICANT CHANGE UP
BILIRUB SERPL-MCNC: 0.9 MG/DL — SIGNIFICANT CHANGE UP (ref 0.2–1.2)
BILIRUB UR-MCNC: NEGATIVE — SIGNIFICANT CHANGE UP
BILIRUB UR-MCNC: NEGATIVE — SIGNIFICANT CHANGE UP
BLD GP AB SCN SERPL QL: NEGATIVE — SIGNIFICANT CHANGE UP
BUN SERPL-MCNC: 9 MG/DL — SIGNIFICANT CHANGE UP (ref 7–23)
CALCIUM SERPL-MCNC: 9.6 MG/DL — SIGNIFICANT CHANGE UP (ref 8.4–10.5)
CHLORIDE SERPL-SCNC: 103 MMOL/L — SIGNIFICANT CHANGE UP (ref 96–108)
CO2 SERPL-SCNC: 16 MMOL/L — LOW (ref 22–31)
COLOR SPEC: SIGNIFICANT CHANGE UP
COLOR SPEC: YELLOW — SIGNIFICANT CHANGE UP
CREAT SERPL-MCNC: 0.58 MG/DL — SIGNIFICANT CHANGE UP (ref 0.5–1.3)
DIFF PNL FLD: NEGATIVE — SIGNIFICANT CHANGE UP
DIFF PNL FLD: NEGATIVE — SIGNIFICANT CHANGE UP
EPI CELLS # UR: 4 /HPF — SIGNIFICANT CHANGE UP
GLUCOSE SERPL-MCNC: 108 MG/DL — HIGH (ref 70–99)
GLUCOSE UR QL: NEGATIVE — SIGNIFICANT CHANGE UP
GLUCOSE UR QL: NEGATIVE — SIGNIFICANT CHANGE UP
HCT VFR BLD CALC: 37.6 % — SIGNIFICANT CHANGE UP (ref 34.5–45)
HGB BLD-MCNC: 11.8 G/DL — SIGNIFICANT CHANGE UP (ref 11.5–15.5)
HYALINE CASTS # UR AUTO: 1 /LPF — SIGNIFICANT CHANGE UP (ref 0–2)
KETONES UR-MCNC: ABNORMAL
KETONES UR-MCNC: NEGATIVE — SIGNIFICANT CHANGE UP
LACTATE SERPL-SCNC: 1.3 MMOL/L — SIGNIFICANT CHANGE UP (ref 0.7–2)
LEUKOCYTE ESTERASE UR-ACNC: NEGATIVE — SIGNIFICANT CHANGE UP
LEUKOCYTE ESTERASE UR-ACNC: NEGATIVE — SIGNIFICANT CHANGE UP
MCHC RBC-ENTMCNC: 21.5 PG — LOW (ref 27–34)
MCHC RBC-ENTMCNC: 31.4 GM/DL — LOW (ref 32–36)
MCV RBC AUTO: 68.5 FL — LOW (ref 80–100)
NITRITE UR-MCNC: NEGATIVE — SIGNIFICANT CHANGE UP
NITRITE UR-MCNC: NEGATIVE — SIGNIFICANT CHANGE UP
NRBC # BLD: 0 /100 WBCS — SIGNIFICANT CHANGE UP (ref 0–0)
PH UR: 6.5 — SIGNIFICANT CHANGE UP (ref 5–8)
PH UR: 7 — SIGNIFICANT CHANGE UP (ref 5–8)
PLATELET # BLD AUTO: 353 K/UL — SIGNIFICANT CHANGE UP (ref 150–400)
POTASSIUM SERPL-MCNC: 3.9 MMOL/L — SIGNIFICANT CHANGE UP (ref 3.5–5.3)
POTASSIUM SERPL-SCNC: 3.9 MMOL/L — SIGNIFICANT CHANGE UP (ref 3.5–5.3)
PROT SERPL-MCNC: 7.5 G/DL — SIGNIFICANT CHANGE UP (ref 6–8.3)
PROT UR-MCNC: ABNORMAL
PROT UR-MCNC: NEGATIVE — SIGNIFICANT CHANGE UP
RBC # BLD: 5.49 M/UL — HIGH (ref 3.8–5.2)
RBC # FLD: 16 % — HIGH (ref 10.3–14.5)
RBC CASTS # UR COMP ASSIST: 1 /HPF — SIGNIFICANT CHANGE UP (ref 0–4)
RH IG SCN BLD-IMP: POSITIVE — SIGNIFICANT CHANGE UP
SODIUM SERPL-SCNC: 138 MMOL/L — SIGNIFICANT CHANGE UP (ref 135–145)
SP GR SPEC: 1.01 — LOW (ref 1.01–1.02)
SP GR SPEC: 1.03 — HIGH (ref 1.01–1.02)
UROBILINOGEN FLD QL: ABNORMAL
UROBILINOGEN FLD QL: ABNORMAL
WBC # BLD: 13.21 K/UL — HIGH (ref 3.8–10.5)
WBC # FLD AUTO: 13.21 K/UL — HIGH (ref 3.8–10.5)
WBC UR QL: 4 /HPF — SIGNIFICANT CHANGE UP (ref 0–5)

## 2019-12-20 PROCEDURE — 76819 FETAL BIOPHYS PROFIL W/O NST: CPT

## 2019-12-20 PROCEDURE — G0463: CPT

## 2019-12-20 PROCEDURE — 81001 URINALYSIS AUTO W/SCOPE: CPT

## 2019-12-20 PROCEDURE — 76816 OB US FOLLOW-UP PER FETUS: CPT

## 2019-12-20 PROCEDURE — 85027 COMPLETE CBC AUTOMATED: CPT

## 2019-12-20 PROCEDURE — 83605 ASSAY OF LACTIC ACID: CPT

## 2019-12-20 PROCEDURE — 59025 FETAL NON-STRESS TEST: CPT

## 2019-12-20 PROCEDURE — 87040 BLOOD CULTURE FOR BACTERIA: CPT

## 2019-12-20 PROCEDURE — 80053 COMPREHEN METABOLIC PANEL: CPT

## 2019-12-20 PROCEDURE — 87086 URINE CULTURE/COLONY COUNT: CPT

## 2019-12-20 PROCEDURE — 81003 URINALYSIS AUTO W/O SCOPE: CPT

## 2019-12-20 RX ORDER — SODIUM CHLORIDE 9 MG/ML
1000 INJECTION, SOLUTION INTRAVENOUS ONCE
Refills: 0 | Status: COMPLETED | OUTPATIENT
Start: 2019-12-20 | End: 2019-12-20

## 2019-12-20 RX ORDER — FAMOTIDINE 10 MG/ML
20 INJECTION INTRAVENOUS DAILY
Refills: 0 | Status: DISCONTINUED | OUTPATIENT
Start: 2019-12-20 | End: 2019-12-20

## 2019-12-20 RX ORDER — ONDANSETRON 8 MG/1
4 TABLET, FILM COATED ORAL ONCE
Refills: 0 | Status: DISCONTINUED | OUTPATIENT
Start: 2019-12-20 | End: 2019-12-20

## 2019-12-20 RX ORDER — ACETAMINOPHEN 500 MG
1000 TABLET ORAL ONCE
Refills: 0 | Status: COMPLETED | OUTPATIENT
Start: 2019-12-20 | End: 2019-12-20

## 2019-12-20 RX ORDER — SODIUM CHLORIDE 9 MG/ML
2000 INJECTION, SOLUTION INTRAVENOUS
Refills: 0 | Status: DISCONTINUED | OUTPATIENT
Start: 2019-12-20 | End: 2020-01-03

## 2019-12-20 RX ORDER — FAMOTIDINE 10 MG/ML
20 INJECTION INTRAVENOUS DAILY
Refills: 0 | Status: DISCONTINUED | OUTPATIENT
Start: 2019-12-20 | End: 2020-01-03

## 2019-12-20 RX ADMIN — SODIUM CHLORIDE 1000 MILLILITER(S): 9 INJECTION, SOLUTION INTRAVENOUS at 06:18

## 2019-12-20 RX ADMIN — Medication 400 MILLIGRAM(S): at 01:06

## 2019-12-20 RX ADMIN — SODIUM CHLORIDE 1000 MILLILITER(S): 9 INJECTION, SOLUTION INTRAVENOUS at 09:18

## 2019-12-20 RX ADMIN — FAMOTIDINE 20 MILLIGRAM(S): 10 INJECTION INTRAVENOUS at 06:20

## 2019-12-20 RX ADMIN — SODIUM CHLORIDE 125 MILLILITER(S): 9 INJECTION, SOLUTION INTRAVENOUS at 00:50

## 2019-12-21 LAB
CULTURE RESULTS: SIGNIFICANT CHANGE UP
SPECIMEN SOURCE: SIGNIFICANT CHANGE UP

## 2019-12-24 ENCOUNTER — ASOB RESULT (OUTPATIENT)
Age: 34
End: 2019-12-24

## 2019-12-24 ENCOUNTER — APPOINTMENT (OUTPATIENT)
Dept: OBGYN | Facility: CLINIC | Age: 34
End: 2019-12-24
Payer: COMMERCIAL

## 2019-12-24 ENCOUNTER — APPOINTMENT (OUTPATIENT)
Dept: ANTEPARTUM | Facility: CLINIC | Age: 34
End: 2019-12-24
Payer: COMMERCIAL

## 2019-12-24 ENCOUNTER — LABORATORY RESULT (OUTPATIENT)
Age: 34
End: 2019-12-24

## 2019-12-24 ENCOUNTER — NON-APPOINTMENT (OUTPATIENT)
Age: 34
End: 2019-12-24

## 2019-12-24 ENCOUNTER — APPOINTMENT (OUTPATIENT)
Dept: ANTEPARTUM | Facility: CLINIC | Age: 34
End: 2019-12-24

## 2019-12-24 ENCOUNTER — INPATIENT (INPATIENT)
Facility: HOSPITAL | Age: 34
LOS: 3 days | Discharge: HOME | End: 2019-12-28
Attending: OBSTETRICS & GYNECOLOGY | Admitting: OBSTETRICS & GYNECOLOGY
Payer: COMMERCIAL

## 2019-12-24 ENCOUNTER — TRANSCRIPTION ENCOUNTER (OUTPATIENT)
Age: 34
End: 2019-12-24

## 2019-12-24 VITALS — HEIGHT: 62 IN | WEIGHT: 200.62 LBS

## 2019-12-24 VITALS
SYSTOLIC BLOOD PRESSURE: 120 MMHG | HEIGHT: 61 IN | DIASTOLIC BLOOD PRESSURE: 60 MMHG | WEIGHT: 201 LBS | BODY MASS INDEX: 37.95 KG/M2

## 2019-12-24 DIAGNOSIS — O26.899 OTHER SPECIFIED PREGNANCY RELATED CONDITIONS, UNSPECIFIED TRIMESTER: ICD-10-CM

## 2019-12-24 DIAGNOSIS — Z34.93 ENCOUNTER FOR SUPERVISION OF NORMAL PREGNANCY, UNSPECIFIED, THIRD TRIMESTER: ICD-10-CM

## 2019-12-24 DIAGNOSIS — K08.499 PARTIAL LOSS OF TEETH DUE TO OTHER SPECIFIED CAUSE, UNSPECIFIED CLASS: Chronic | ICD-10-CM

## 2019-12-24 DIAGNOSIS — Z3A.00 WEEKS OF GESTATION OF PREGNANCY NOT SPECIFIED: ICD-10-CM

## 2019-12-24 DIAGNOSIS — Z34.80 ENCOUNTER FOR SUPERVISION OF OTHER NORMAL PREGNANCY, UNSPECIFIED TRIMESTER: ICD-10-CM

## 2019-12-24 LAB
BLD GP AB SCN SERPL QL: NEGATIVE — SIGNIFICANT CHANGE UP
HCT VFR BLD CALC: 36.1 % — SIGNIFICANT CHANGE UP (ref 34.5–45)
HGB BLD-MCNC: 11 G/DL — LOW (ref 11.5–15.5)
MCHC RBC-ENTMCNC: 21.2 PG — LOW (ref 27–34)
MCHC RBC-ENTMCNC: 30.5 GM/DL — LOW (ref 32–36)
MCV RBC AUTO: 69.6 FL — LOW (ref 80–100)
NRBC # BLD: 0 /100 WBCS — SIGNIFICANT CHANGE UP (ref 0–0)
PLATELET # BLD AUTO: 348 K/UL — SIGNIFICANT CHANGE UP (ref 150–400)
RBC # BLD: 5.19 M/UL — SIGNIFICANT CHANGE UP (ref 3.8–5.2)
RBC # FLD: 16.4 % — HIGH (ref 10.3–14.5)
RH IG SCN BLD-IMP: POSITIVE — SIGNIFICANT CHANGE UP
T PALLIDUM AB TITR SER: NEGATIVE — SIGNIFICANT CHANGE UP
WBC # BLD: 8.14 K/UL — SIGNIFICANT CHANGE UP (ref 3.8–10.5)
WBC # FLD AUTO: 8.14 K/UL — SIGNIFICANT CHANGE UP (ref 3.8–10.5)

## 2019-12-24 PROCEDURE — 76819 FETAL BIOPHYS PROFIL W/O NST: CPT

## 2019-12-24 PROCEDURE — 0502F SUBSEQUENT PRENATAL CARE: CPT

## 2019-12-24 RX ORDER — CITRIC ACID/SODIUM CITRATE 300-500 MG
15 SOLUTION, ORAL ORAL EVERY 6 HOURS
Refills: 0 | Status: DISCONTINUED | OUTPATIENT
Start: 2019-12-24 | End: 2019-12-25

## 2019-12-24 RX ORDER — SODIUM CHLORIDE 9 MG/ML
1000 INJECTION, SOLUTION INTRAVENOUS
Refills: 0 | Status: DISCONTINUED | OUTPATIENT
Start: 2019-12-24 | End: 2019-12-25

## 2019-12-24 RX ORDER — OXYTOCIN 10 UNIT/ML
333.33 VIAL (ML) INJECTION
Qty: 20 | Refills: 0 | Status: DISCONTINUED | OUTPATIENT
Start: 2019-12-24 | End: 2019-12-25

## 2019-12-24 RX ORDER — SODIUM CHLORIDE 9 MG/ML
1000 INJECTION, SOLUTION INTRAVENOUS
Refills: 0 | Status: DISCONTINUED | OUTPATIENT
Start: 2019-12-24 | End: 2019-12-28

## 2019-12-24 NOTE — PATIENT PROFILE OB - AS SC BRADEN NUTRITION
Bed: G14  Expected date: 3/18/18  Expected time: 1:42 AM  Means of arrival: [facility] (44)  Comments:  89 F back pain   A&OX4  178/90 P 82 R 16 96% GCS 15   (4) excellent

## 2019-12-25 LAB
ALBUMIN SERPL ELPH-MCNC: 2.8 G/DL — LOW (ref 3.3–5)
ALP SERPL-CCNC: 142 U/L — HIGH (ref 40–120)
ALT FLD-CCNC: 157 U/L — HIGH (ref 10–45)
ANION GAP SERPL CALC-SCNC: 13 MMOL/L — SIGNIFICANT CHANGE UP (ref 5–17)
APTT BLD: 27.8 SEC — SIGNIFICANT CHANGE UP (ref 27.5–36.3)
AST SERPL-CCNC: 163 U/L — HIGH (ref 10–40)
BASOPHILS # BLD AUTO: 0.02 K/UL — SIGNIFICANT CHANGE UP (ref 0–0.2)
BASOPHILS NFR BLD AUTO: 0.2 % — SIGNIFICANT CHANGE UP (ref 0–2)
BILIRUB SERPL-MCNC: 0.7 MG/DL — SIGNIFICANT CHANGE UP (ref 0.2–1.2)
BUN SERPL-MCNC: <4 MG/DL — LOW (ref 7–23)
CALCIUM SERPL-MCNC: 9 MG/DL — SIGNIFICANT CHANGE UP (ref 8.4–10.5)
CHLORIDE SERPL-SCNC: 104 MMOL/L — SIGNIFICANT CHANGE UP (ref 96–108)
CO2 SERPL-SCNC: 23 MMOL/L — SIGNIFICANT CHANGE UP (ref 22–31)
CREAT SERPL-MCNC: 0.54 MG/DL — SIGNIFICANT CHANGE UP (ref 0.5–1.3)
CULTURE RESULTS: SIGNIFICANT CHANGE UP
CULTURE RESULTS: SIGNIFICANT CHANGE UP
EOSINOPHIL # BLD AUTO: 0.01 K/UL — SIGNIFICANT CHANGE UP (ref 0–0.5)
EOSINOPHIL NFR BLD AUTO: 0.1 % — SIGNIFICANT CHANGE UP (ref 0–6)
FIBRINOGEN PPP-MCNC: 656 MG/DL — HIGH (ref 350–510)
GLUCOSE SERPL-MCNC: 104 MG/DL — HIGH (ref 70–99)
HCT VFR BLD CALC: 36 % — SIGNIFICANT CHANGE UP (ref 34.5–45)
HGB BLD-MCNC: 11.3 G/DL — LOW (ref 11.5–15.5)
IMM GRANULOCYTES NFR BLD AUTO: 1.2 % — SIGNIFICANT CHANGE UP (ref 0–1.5)
INR BLD: 1 RATIO — SIGNIFICANT CHANGE UP (ref 0.88–1.16)
LDH SERPL L TO P-CCNC: 247 U/L — HIGH (ref 50–242)
LYMPHOCYTES # BLD AUTO: 1.42 K/UL — SIGNIFICANT CHANGE UP (ref 1–3.3)
LYMPHOCYTES # BLD AUTO: 13.2 % — SIGNIFICANT CHANGE UP (ref 13–44)
MAGNESIUM SERPL-MCNC: 3.9 MG/DL — HIGH (ref 1.6–2.6)
MAGNESIUM SERPL-MCNC: 4.8 MG/DL — HIGH (ref 1.6–2.6)
MCHC RBC-ENTMCNC: 21.9 PG — LOW (ref 27–34)
MCHC RBC-ENTMCNC: 31.4 GM/DL — LOW (ref 32–36)
MCV RBC AUTO: 69.9 FL — LOW (ref 80–100)
MONOCYTES # BLD AUTO: 0.34 K/UL — SIGNIFICANT CHANGE UP (ref 0–0.9)
MONOCYTES NFR BLD AUTO: 3.2 % — SIGNIFICANT CHANGE UP (ref 2–14)
NEUTROPHILS # BLD AUTO: 8.81 K/UL — HIGH (ref 1.8–7.4)
NEUTROPHILS NFR BLD AUTO: 82.1 % — HIGH (ref 43–77)
NRBC # BLD: 0 /100 WBCS — SIGNIFICANT CHANGE UP (ref 0–0)
PLATELET # BLD AUTO: 316 K/UL — SIGNIFICANT CHANGE UP (ref 150–400)
POTASSIUM SERPL-MCNC: 4.2 MMOL/L — SIGNIFICANT CHANGE UP (ref 3.5–5.3)
POTASSIUM SERPL-SCNC: 4.2 MMOL/L — SIGNIFICANT CHANGE UP (ref 3.5–5.3)
PROT SERPL-MCNC: 6.4 G/DL — SIGNIFICANT CHANGE UP (ref 6–8.3)
PROTHROM AB SERPL-ACNC: 11.5 SEC — SIGNIFICANT CHANGE UP (ref 10–12.9)
RBC # BLD: 5.15 M/UL — SIGNIFICANT CHANGE UP (ref 3.8–5.2)
RBC # FLD: 16.3 % — HIGH (ref 10.3–14.5)
SODIUM SERPL-SCNC: 140 MMOL/L — SIGNIFICANT CHANGE UP (ref 135–145)
SPECIMEN SOURCE: SIGNIFICANT CHANGE UP
SPECIMEN SOURCE: SIGNIFICANT CHANGE UP
URATE SERPL-MCNC: 5.5 MG/DL — SIGNIFICANT CHANGE UP (ref 2.5–7)
WBC # BLD: 10.73 K/UL — HIGH (ref 3.8–10.5)
WBC # FLD AUTO: 10.73 K/UL — HIGH (ref 3.8–10.5)

## 2019-12-25 PROCEDURE — 59510 CESAREAN DELIVERY: CPT

## 2019-12-25 RX ORDER — MAGNESIUM SULFATE 500 MG/ML
2 VIAL (ML) INJECTION
Qty: 40 | Refills: 0 | Status: DISCONTINUED | OUTPATIENT
Start: 2019-12-25 | End: 2019-12-26

## 2019-12-25 RX ORDER — ACETAMINOPHEN 500 MG
975 TABLET ORAL
Refills: 0 | Status: DISCONTINUED | OUTPATIENT
Start: 2019-12-25 | End: 2019-12-28

## 2019-12-25 RX ORDER — CITRIC ACID/SODIUM CITRATE 300-500 MG
30 SOLUTION, ORAL ORAL ONCE
Refills: 0 | Status: COMPLETED | OUTPATIENT
Start: 2019-12-25 | End: 2019-12-25

## 2019-12-25 RX ORDER — DIPHENHYDRAMINE HCL 50 MG
25 CAPSULE ORAL EVERY 6 HOURS
Refills: 0 | Status: DISCONTINUED | OUTPATIENT
Start: 2019-12-25 | End: 2019-12-28

## 2019-12-25 RX ORDER — OXYTOCIN 10 UNIT/ML
333.33 VIAL (ML) INJECTION
Qty: 20 | Refills: 0 | Status: DISCONTINUED | OUTPATIENT
Start: 2019-12-25 | End: 2019-12-28

## 2019-12-25 RX ORDER — NALOXONE HYDROCHLORIDE 4 MG/.1ML
0.1 SPRAY NASAL
Refills: 0 | Status: DISCONTINUED | OUTPATIENT
Start: 2019-12-25 | End: 2019-12-28

## 2019-12-25 RX ORDER — TRAMADOL HYDROCHLORIDE 50 MG/1
50 TABLET ORAL EVERY 6 HOURS
Refills: 0 | Status: DISCONTINUED | OUTPATIENT
Start: 2019-12-25 | End: 2019-12-25

## 2019-12-25 RX ORDER — GLYCERIN ADULT
1 SUPPOSITORY, RECTAL RECTAL AT BEDTIME
Refills: 0 | Status: DISCONTINUED | OUTPATIENT
Start: 2019-12-25 | End: 2019-12-28

## 2019-12-25 RX ORDER — FENTANYL/BUPIVACAINE/NS/PF 2MCG/ML-.1
250 PLASTIC BAG, INJECTION (ML) INJECTION
Refills: 0 | Status: DISCONTINUED | OUTPATIENT
Start: 2019-12-25 | End: 2019-12-25

## 2019-12-25 RX ORDER — MAGNESIUM SULFATE 500 MG/ML
4 VIAL (ML) INJECTION ONCE
Refills: 0 | Status: COMPLETED | OUTPATIENT
Start: 2019-12-25 | End: 2019-12-25

## 2019-12-25 RX ORDER — FAMOTIDINE 10 MG/ML
20 INJECTION INTRAVENOUS ONCE
Refills: 0 | Status: COMPLETED | OUTPATIENT
Start: 2019-12-25 | End: 2019-12-25

## 2019-12-25 RX ORDER — MAGNESIUM HYDROXIDE 400 MG/1
30 TABLET, CHEWABLE ORAL
Refills: 0 | Status: DISCONTINUED | OUTPATIENT
Start: 2019-12-25 | End: 2019-12-28

## 2019-12-25 RX ORDER — IBUPROFEN 200 MG
600 TABLET ORAL EVERY 6 HOURS
Refills: 0 | Status: COMPLETED | OUTPATIENT
Start: 2019-12-25 | End: 2020-11-22

## 2019-12-25 RX ORDER — ONDANSETRON 8 MG/1
4 TABLET, FILM COATED ORAL EVERY 6 HOURS
Refills: 0 | Status: DISCONTINUED | OUTPATIENT
Start: 2019-12-25 | End: 2019-12-28

## 2019-12-25 RX ORDER — DEXAMETHASONE 0.5 MG/5ML
4 ELIXIR ORAL EVERY 6 HOURS
Refills: 0 | Status: DISCONTINUED | OUTPATIENT
Start: 2019-12-25 | End: 2019-12-28

## 2019-12-25 RX ORDER — SIMETHICONE 80 MG/1
80 TABLET, CHEWABLE ORAL EVERY 4 HOURS
Refills: 0 | Status: DISCONTINUED | OUTPATIENT
Start: 2019-12-25 | End: 2019-12-28

## 2019-12-25 RX ORDER — LANOLIN
1 OINTMENT (GRAM) TOPICAL EVERY 6 HOURS
Refills: 0 | Status: DISCONTINUED | OUTPATIENT
Start: 2019-12-25 | End: 2019-12-28

## 2019-12-25 RX ORDER — TETANUS TOXOID, REDUCED DIPHTHERIA TOXOID AND ACELLULAR PERTUSSIS VACCINE, ADSORBED 5; 2.5; 8; 8; 2.5 [IU]/.5ML; [IU]/.5ML; UG/.5ML; UG/.5ML; UG/.5ML
0.5 SUSPENSION INTRAMUSCULAR ONCE
Refills: 0 | Status: DISCONTINUED | OUTPATIENT
Start: 2019-12-25 | End: 2019-12-28

## 2019-12-25 RX ORDER — SODIUM CHLORIDE 9 MG/ML
1000 INJECTION, SOLUTION INTRAVENOUS
Refills: 0 | Status: DISCONTINUED | OUTPATIENT
Start: 2019-12-25 | End: 2019-12-28

## 2019-12-25 RX ORDER — FENTANYL/BUPIVACAINE/NS/PF 2MCG/ML-.1
5 PLASTIC BAG, INJECTION (ML) INJECTION
Refills: 0 | Status: DISCONTINUED | OUTPATIENT
Start: 2019-12-25 | End: 2019-12-25

## 2019-12-25 RX ORDER — MAGNESIUM SULFATE 500 MG/ML
2 VIAL (ML) INJECTION
Qty: 40 | Refills: 0 | Status: DISCONTINUED | OUTPATIENT
Start: 2019-12-25 | End: 2019-12-25

## 2019-12-25 RX ORDER — HEPARIN SODIUM 5000 [USP'U]/ML
5000 INJECTION INTRAVENOUS; SUBCUTANEOUS EVERY 12 HOURS
Refills: 0 | Status: DISCONTINUED | OUTPATIENT
Start: 2019-12-25 | End: 2019-12-28

## 2019-12-25 RX ORDER — OXYCODONE HYDROCHLORIDE 5 MG/1
5 TABLET ORAL ONCE
Refills: 0 | Status: DISCONTINUED | OUTPATIENT
Start: 2019-12-25 | End: 2019-12-25

## 2019-12-25 RX ORDER — KETOROLAC TROMETHAMINE 30 MG/ML
30 SYRINGE (ML) INJECTION EVERY 6 HOURS
Refills: 0 | Status: DISCONTINUED | OUTPATIENT
Start: 2019-12-25 | End: 2019-12-26

## 2019-12-25 RX ORDER — OXYCODONE HYDROCHLORIDE 5 MG/1
5 TABLET ORAL
Refills: 0 | Status: DISCONTINUED | OUTPATIENT
Start: 2019-12-25 | End: 2019-12-25

## 2019-12-25 RX ORDER — METOCLOPRAMIDE HCL 10 MG
10 TABLET ORAL ONCE
Refills: 0 | Status: COMPLETED | OUTPATIENT
Start: 2019-12-25 | End: 2019-12-25

## 2019-12-25 RX ADMIN — Medication 200 GRAM(S): at 07:46

## 2019-12-25 RX ADMIN — Medication 975 MILLIGRAM(S): at 17:35

## 2019-12-25 RX ADMIN — Medication 975 MILLIGRAM(S): at 10:17

## 2019-12-25 RX ADMIN — Medication 30 MILLIGRAM(S): at 13:10

## 2019-12-25 RX ADMIN — SIMETHICONE 80 MILLIGRAM(S): 80 TABLET, CHEWABLE ORAL at 10:17

## 2019-12-25 RX ADMIN — Medication 975 MILLIGRAM(S): at 22:44

## 2019-12-25 RX ADMIN — FAMOTIDINE 20 MILLIGRAM(S): 10 INJECTION INTRAVENOUS at 03:12

## 2019-12-25 RX ADMIN — Medication 30 MILLIGRAM(S): at 22:46

## 2019-12-25 RX ADMIN — Medication 250 MILLILITER(S): at 05:37

## 2019-12-25 RX ADMIN — Medication 975 MILLIGRAM(S): at 23:46

## 2019-12-25 RX ADMIN — HEPARIN SODIUM 5000 UNIT(S): 5000 INJECTION INTRAVENOUS; SUBCUTANEOUS at 18:23

## 2019-12-25 RX ADMIN — Medication 975 MILLIGRAM(S): at 17:05

## 2019-12-25 RX ADMIN — Medication 10 MILLIGRAM(S): at 03:10

## 2019-12-25 RX ADMIN — Medication 30 MILLIGRAM(S): at 12:38

## 2019-12-25 RX ADMIN — Medication 250 MILLILITER(S): at 08:25

## 2019-12-25 RX ADMIN — Medication 50 GM/HR: at 08:07

## 2019-12-25 RX ADMIN — Medication 30 MILLIGRAM(S): at 05:15

## 2019-12-25 RX ADMIN — Medication 30 MILLIGRAM(S): at 19:00

## 2019-12-25 RX ADMIN — Medication 30 MILLIGRAM(S): at 18:23

## 2019-12-25 RX ADMIN — Medication 250 MILLILITER(S): at 07:10

## 2019-12-25 RX ADMIN — Medication 30 MILLIGRAM(S): at 23:46

## 2019-12-25 RX ADMIN — SIMETHICONE 80 MILLIGRAM(S): 80 TABLET, CHEWABLE ORAL at 14:51

## 2019-12-25 RX ADMIN — Medication 975 MILLIGRAM(S): at 10:50

## 2019-12-25 RX ADMIN — Medication 30 MILLILITER(S): at 03:08

## 2019-12-25 NOTE — CHART NOTE - NSCHARTNOTEFT_GEN_A_CORE
Patient with elevation of liver enzymes and mildly elevated BPs. Asymptomatic.   AST/ALT: 39/36->163/157  Platelets: 316   Fibrinogen 656   LDH: 247    Plan  - Magnesium  J Raj PGY2  d/w Dr. Rousseau

## 2019-12-25 NOTE — PRE-ANESTHESIA EVALUATION ADULT - NSANTHOSAYNRD_GEN_A_CORE
No. SHAUN screening performed.  STOP BANG Legend: 0-2 = LOW Risk; 3-4 = INTERMEDIATE Risk; 5-8 = HIGH Risk

## 2019-12-25 NOTE — CHART NOTE - NSCHARTNOTEFT_GEN_A_CORE
Anesthesia Pain Management Service    SUBJECTIVE:   Pain Scale Score:    [X] Refer to charted pain scores    MEDICATIONS  (STANDING):  acetaminophen   Tablet .. 975 milliGRAM(s) Oral <User Schedule>  dextrose 5% + lactated ringers. 1000 milliLiter(s) (999 mL/Hr) IV Continuous <Continuous>  diphtheria/tetanus/pertussis (acellular) Vaccine (ADAcel) 0.5 milliLiter(s) IntraMuscular once  fentanyl (3 MICROgram(s)/mL) + bupivacaine 0.01% in 0.9% Sodium Chloride PCEA 250 milliLiter(s) Epidural PCA Continuous  heparin  Injectable 5000 Unit(s) SubCutaneous every 12 hours  ibuprofen  Tablet. 600 milliGRAM(s) Oral every 6 hours  ketorolac   Injectable 30 milliGRAM(s) IV Push every 6 hours  lactated ringers. 1000 milliLiter(s) (125 mL/Hr) IV Continuous <Continuous>  magnesium sulfate Infusion 2 Gm/Hr (50 mL/Hr) IV Continuous <Continuous>  oxytocin Infusion 333.333 milliUNIT(s)/Min (1000 mL/Hr) IV Continuous <Continuous>    MEDICATIONS  (PRN):  dexAMETHasone  Injectable 4 milliGRAM(s) IV Push every 6 hours PRN Nausea  diphenhydrAMINE 25 milliGRAM(s) Oral every 6 hours PRN Itching  fentanyl (3 MICROgram(s)/mL) + bupivacaine 0.01% in 0.9% Sodium Chloride PCEA [facility] 5 milliLiter(s) Epidural every 15 minutes PRN Severe Pain (7 - 10)  glycerin Suppository - Adult 1 Suppository(s) Rectal at bedtime PRN Constipation  lanolin Ointment 1 Application(s) Topical every 6 hours PRN Sore Nipples  magnesium hydroxide Suspension 30 milliLiter(s) Oral two times a day PRN Constipation  naloxone Injectable 0.1 milliGRAM(s) IV Push every 3 minutes PRN For ANY of the following changes in patient status:  A. RR LESS THAN 10 breaths per minute, B. Oxygen saturation LESS THAN 90%, C. Sedation score of 6  ondansetron Injectable 4 milliGRAM(s) IV Push every 6 hours PRN Nausea  oxyCODONE    IR 5 milliGRAM(s) Oral every 3 hours PRN Moderate to Severe Pain (4-10)  oxyCODONE    IR 5 milliGRAM(s) Oral once PRN Moderate to Severe Pain (4-10)  simethicone 80 milliGRAM(s) Chew every 4 hours PRN Gas      OBJECTIVE:    Assessment of Epidural Catheter Site: 	    [X] Dressing intact	[X] Site non-tender	[X] Site without erythema, discharge, edema  [X] Epidural tubing and connection checked	[X] Gross neurological exam within normal limits  [ ] Catheter removed – tip intact		    PT/INR - ( 25 Dec 2019 06:02 )   PT: 11.5 sec;   INR: 1.00 ratio         PTT - ( 25 Dec 2019 06:02 )  PTT:27.8 sec                      11.3   10.73 )-----------( 316      ( 25 Dec 2019 06:02 )             36.0     Vital Signs Last 24 Hrs  T(C): 36.5 (12-25-19 @ 12:10), Max: 36.7 (12-25-19 @ 07:55)  T(F): 97.7 (12-25-19 @ 12:10), Max: 98.1 (12-25-19 @ 07:55)  HR: 98 (12-25-19 @ 12:10) (64 - 100)  BP: 138/84 (12-25-19 @ 12:10) (132/66 - 148/86)  BP(mean): 91 (12-25-19 @ 09:10) (91 - 111)  RR: 18 (12-25-19 @ 12:10) (17 - 20)  SpO2: 97% (12-25-19 @ 12:10) (95% - 100%)      Sedation Score:	[X] Alert	[ ] Drowsy	[ ] Arousable  [ ] Asleep  [ ] Unresponsive    Side Effects:	[X] None	[ ] Nausea	[ ] Vomiting  [ ] Pruritus  		[ ] Weakness  [ ] Numbness  [ ] Other:    ASSESSMENT/ PLAN:    Therapy:                         [X] Continue   [ ] Discontinue   [ ] Change to PRN Analgesics    Documentation and Verification of current medications:  [X] Done	[ ] Not done, not eligible, reason:    Comments:

## 2019-12-26 LAB
ALBUMIN SERPL ELPH-MCNC: 2.7 G/DL — LOW (ref 3.3–5)
ALP SERPL-CCNC: 123 U/L — HIGH (ref 40–120)
ALT FLD-CCNC: 152 U/L — HIGH (ref 10–45)
ANION GAP SERPL CALC-SCNC: 14 MMOL/L — SIGNIFICANT CHANGE UP (ref 5–17)
APTT BLD: 27.7 SEC — SIGNIFICANT CHANGE UP (ref 27.5–36.3)
AST SERPL-CCNC: 113 U/L — HIGH (ref 10–40)
BASOPHILS # BLD AUTO: 0.01 K/UL — SIGNIFICANT CHANGE UP (ref 0–0.2)
BASOPHILS NFR BLD AUTO: 0.1 % — SIGNIFICANT CHANGE UP (ref 0–2)
BILIRUB SERPL-MCNC: 0.3 MG/DL — SIGNIFICANT CHANGE UP (ref 0.2–1.2)
BUN SERPL-MCNC: <4 MG/DL — LOW (ref 7–23)
CALCIUM SERPL-MCNC: 7.5 MG/DL — LOW (ref 8.4–10.5)
CHLORIDE SERPL-SCNC: 101 MMOL/L — SIGNIFICANT CHANGE UP (ref 96–108)
CO2 SERPL-SCNC: 24 MMOL/L — SIGNIFICANT CHANGE UP (ref 22–31)
CREAT SERPL-MCNC: 0.49 MG/DL — LOW (ref 0.5–1.3)
EOSINOPHIL # BLD AUTO: 0.04 K/UL — SIGNIFICANT CHANGE UP (ref 0–0.5)
EOSINOPHIL NFR BLD AUTO: 0.3 % — SIGNIFICANT CHANGE UP (ref 0–6)
FIBRINOGEN PPP-MCNC: 667 MG/DL — HIGH (ref 350–510)
GLUCOSE SERPL-MCNC: 96 MG/DL — SIGNIFICANT CHANGE UP (ref 70–99)
HCT VFR BLD CALC: 30.9 % — LOW (ref 34.5–45)
HGB BLD-MCNC: 9.6 G/DL — LOW (ref 11.5–15.5)
IMM GRANULOCYTES NFR BLD AUTO: 0.6 % — SIGNIFICANT CHANGE UP (ref 0–1.5)
INR BLD: 0.97 RATIO — SIGNIFICANT CHANGE UP (ref 0.88–1.16)
LDH SERPL L TO P-CCNC: 230 U/L — SIGNIFICANT CHANGE UP (ref 50–242)
LYMPHOCYTES # BLD AUTO: 18.3 % — SIGNIFICANT CHANGE UP (ref 13–44)
LYMPHOCYTES # BLD AUTO: 2.7 K/UL — SIGNIFICANT CHANGE UP (ref 1–3.3)
MAGNESIUM SERPL-MCNC: 4.5 MG/DL — HIGH (ref 1.6–2.6)
MAGNESIUM SERPL-MCNC: 5.3 MG/DL — HIGH (ref 1.6–2.6)
MCHC RBC-ENTMCNC: 21.3 PG — LOW (ref 27–34)
MCHC RBC-ENTMCNC: 31.1 GM/DL — LOW (ref 32–36)
MCV RBC AUTO: 68.7 FL — LOW (ref 80–100)
MONOCYTES # BLD AUTO: 1.3 K/UL — HIGH (ref 0–0.9)
MONOCYTES NFR BLD AUTO: 8.8 % — SIGNIFICANT CHANGE UP (ref 2–14)
NEUTROPHILS # BLD AUTO: 10.65 K/UL — HIGH (ref 1.8–7.4)
NEUTROPHILS NFR BLD AUTO: 71.9 % — SIGNIFICANT CHANGE UP (ref 43–77)
NRBC # BLD: 0 /100 WBCS — SIGNIFICANT CHANGE UP (ref 0–0)
PLATELET # BLD AUTO: 324 K/UL — SIGNIFICANT CHANGE UP (ref 150–400)
POTASSIUM SERPL-MCNC: 3.1 MMOL/L — LOW (ref 3.5–5.3)
POTASSIUM SERPL-SCNC: 3.1 MMOL/L — LOW (ref 3.5–5.3)
PROT SERPL-MCNC: 5.8 G/DL — LOW (ref 6–8.3)
PROTHROM AB SERPL-ACNC: 11 SEC — SIGNIFICANT CHANGE UP (ref 10–12.9)
RBC # BLD: 4.5 M/UL — SIGNIFICANT CHANGE UP (ref 3.8–5.2)
RBC # FLD: 16.2 % — HIGH (ref 10.3–14.5)
SODIUM SERPL-SCNC: 139 MMOL/L — SIGNIFICANT CHANGE UP (ref 135–145)
URATE SERPL-MCNC: 5.6 MG/DL — SIGNIFICANT CHANGE UP (ref 2.5–7)
WBC # BLD: 14.79 K/UL — HIGH (ref 3.8–10.5)
WBC # FLD AUTO: 14.79 K/UL — HIGH (ref 3.8–10.5)

## 2019-12-26 RX ORDER — HYDROMORPHONE HYDROCHLORIDE 2 MG/ML
0.2 INJECTION INTRAMUSCULAR; INTRAVENOUS; SUBCUTANEOUS ONCE
Refills: 0 | Status: DISCONTINUED | OUTPATIENT
Start: 2019-12-26 | End: 2019-12-26

## 2019-12-26 RX ORDER — HYDROMORPHONE HYDROCHLORIDE 2 MG/ML
1 INJECTION INTRAMUSCULAR; INTRAVENOUS; SUBCUTANEOUS ONCE
Refills: 0 | Status: DISCONTINUED | OUTPATIENT
Start: 2019-12-26 | End: 2019-12-26

## 2019-12-26 RX ORDER — IBUPROFEN 200 MG
600 TABLET ORAL EVERY 6 HOURS
Refills: 0 | Status: DISCONTINUED | OUTPATIENT
Start: 2019-12-26 | End: 2019-12-28

## 2019-12-26 RX ORDER — HYDROMORPHONE HYDROCHLORIDE 2 MG/ML
2 INJECTION INTRAMUSCULAR; INTRAVENOUS; SUBCUTANEOUS EVERY 6 HOURS
Refills: 0 | Status: DISCONTINUED | OUTPATIENT
Start: 2019-12-26 | End: 2019-12-28

## 2019-12-26 RX ADMIN — HYDROMORPHONE HYDROCHLORIDE 2 MILLIGRAM(S): 2 INJECTION INTRAMUSCULAR; INTRAVENOUS; SUBCUTANEOUS at 15:15

## 2019-12-26 RX ADMIN — HYDROMORPHONE HYDROCHLORIDE 0.2 MILLIGRAM(S): 2 INJECTION INTRAMUSCULAR; INTRAVENOUS; SUBCUTANEOUS at 03:42

## 2019-12-26 RX ADMIN — Medication 975 MILLIGRAM(S): at 04:35

## 2019-12-26 RX ADMIN — HYDROMORPHONE HYDROCHLORIDE 2 MILLIGRAM(S): 2 INJECTION INTRAMUSCULAR; INTRAVENOUS; SUBCUTANEOUS at 09:30

## 2019-12-26 RX ADMIN — HYDROMORPHONE HYDROCHLORIDE 2 MILLIGRAM(S): 2 INJECTION INTRAMUSCULAR; INTRAVENOUS; SUBCUTANEOUS at 21:41

## 2019-12-26 RX ADMIN — Medication 975 MILLIGRAM(S): at 14:38

## 2019-12-26 RX ADMIN — HEPARIN SODIUM 5000 UNIT(S): 5000 INJECTION INTRAVENOUS; SUBCUTANEOUS at 21:38

## 2019-12-26 RX ADMIN — HEPARIN SODIUM 5000 UNIT(S): 5000 INJECTION INTRAVENOUS; SUBCUTANEOUS at 04:41

## 2019-12-26 RX ADMIN — Medication 975 MILLIGRAM(S): at 09:30

## 2019-12-26 RX ADMIN — HEPARIN SODIUM 5000 UNIT(S): 5000 INJECTION INTRAVENOUS; SUBCUTANEOUS at 10:56

## 2019-12-26 RX ADMIN — Medication 975 MILLIGRAM(S): at 00:00

## 2019-12-26 RX ADMIN — Medication 30 MILLIGRAM(S): at 04:36

## 2019-12-26 RX ADMIN — Medication 975 MILLIGRAM(S): at 20:58

## 2019-12-26 RX ADMIN — Medication 975 MILLIGRAM(S): at 21:41

## 2019-12-26 RX ADMIN — Medication 30 MILLIGRAM(S): at 12:01

## 2019-12-26 RX ADMIN — HYDROMORPHONE HYDROCHLORIDE 2 MILLIGRAM(S): 2 INJECTION INTRAMUSCULAR; INTRAVENOUS; SUBCUTANEOUS at 20:58

## 2019-12-26 RX ADMIN — Medication 975 MILLIGRAM(S): at 08:48

## 2019-12-26 RX ADMIN — Medication 30 MILLIGRAM(S): at 12:45

## 2019-12-26 RX ADMIN — Medication 975 MILLIGRAM(S): at 15:15

## 2019-12-26 RX ADMIN — HYDROMORPHONE HYDROCHLORIDE 0.2 MILLIGRAM(S): 2 INJECTION INTRAMUSCULAR; INTRAVENOUS; SUBCUTANEOUS at 04:26

## 2019-12-26 RX ADMIN — HYDROMORPHONE HYDROCHLORIDE 2 MILLIGRAM(S): 2 INJECTION INTRAMUSCULAR; INTRAVENOUS; SUBCUTANEOUS at 14:38

## 2019-12-26 RX ADMIN — HYDROMORPHONE HYDROCHLORIDE 2 MILLIGRAM(S): 2 INJECTION INTRAMUSCULAR; INTRAVENOUS; SUBCUTANEOUS at 08:48

## 2019-12-26 NOTE — PROGRESS NOTE ADULT - SUBJECTIVE AND OBJECTIVE BOX
OB Postpartum Note: Repeat  Delivery, POD#1     S: Her pain is well controlled. She is tolerating a regular diet. Pt denies flatus Denies N/V. Denies HA/CP/SOB/lightheadedness/dizziness.    Ambulating without difficulty.    O:   Vitals:  Vital Signs Last 24 Hrs  T(C): 36.3 (26 Dec 2019 05:10), Max: 37.1 (25 Dec 2019 16:30)  T(F): 97.4 (26 Dec 2019 05:10), Max: 98.8 (25 Dec 2019 16:30)  HR: 98 (26 Dec 2019 05:10) (72 - 115)  BP: 129/78 (26 Dec 2019 05:10) (120/77 - 147/71)  BP(mean): 91 (25 Dec 2019 09:10) (91 - 105)  RR: 18 (26 Dec 2019 05:10) (17 - 20)  SpO2: 99% (26 Dec 2019 05:10) (97% - 100%)    MEDICATIONS  (STANDING):  acetaminophen   Tablet .. 975 milliGRAM(s) Oral <User Schedule>  dextrose 5% + lactated ringers. 1000 milliLiter(s) (999 mL/Hr) IV Continuous <Continuous>  diphtheria/tetanus/pertussis (acellular) Vaccine (ADAcel) 0.5 milliLiter(s) IntraMuscular once  heparin  Injectable 5000 Unit(s) SubCutaneous every 12 hours  ibuprofen  Tablet. 600 milliGRAM(s) Oral every 6 hours  ketorolac   Injectable 30 milliGRAM(s) IV Push every 6 hours  lactated ringers. 1000 milliLiter(s) (125 mL/Hr) IV Continuous <Continuous>  magnesium sulfate Infusion 2 Gm/Hr (50 mL/Hr) IV Continuous <Continuous>  oxytocin Infusion 333.333 milliUNIT(s)/Min (1000 mL/Hr) IV Continuous <Continuous>    MEDICATIONS  (PRN):  dexAMETHasone  Injectable 4 milliGRAM(s) IV Push every 6 hours PRN Nausea  diphenhydrAMINE 25 milliGRAM(s) Oral every 6 hours PRN Itching  glycerin Suppository - Adult 1 Suppository(s) Rectal at bedtime PRN Constipation  HYDROmorphone   Tablet 2 milliGRAM(s) Oral every 6 hours PRN Severe Pain (7 - 10)  lanolin Ointment 1 Application(s) Topical every 6 hours PRN Sore Nipples  magnesium hydroxide Suspension 30 milliLiter(s) Oral two times a day PRN Constipation  naloxone Injectable 0.1 milliGRAM(s) IV Push every 3 minutes PRN For ANY of the following changes in patient status:  A. RR LESS THAN 10 breaths per minute, B. Oxygen saturation LESS THAN 90%, C. Sedation score of 6  ondansetron Injectable 4 milliGRAM(s) IV Push every 6 hours PRN Nausea  simethicone 80 milliGRAM(s) Chew every 4 hours PRN Gas      Labs:  Blood type: O Positive  Rubella IgG: RPR: Negative                          11.3<L>   10.73<H> >-----------< 316    (  @ 06:02 )             36.0                        11.0<L>   8.14 >-----------< 348    (  @ 15:04 )             36.1    19 @ 06:02      140  |  104  |  <4<L>  ----------------------------<  104<H>  4.2   |  23  |  0.54        Ca    9.0      25 Dec 2019 06:02  Mg     5.3<H>     12-26  Mg     4.8<H>     -25  Mg     3.9<H>         TPro  6.4  /  Alb  2.8<L>  /  TBili  0.7  /  DBili  x   /  AST  163<H>  /  ALT  157<H>  /  AlkPhos  142<H>  19 @ 06:02          PE:  General: NAD  Abdomen: mildly distended,appropriately tender, incision dressing c/d/i.  Extremities: SCDs in place, no erythema

## 2019-12-26 NOTE — PROGRESS NOTE ADULT - ASSESSMENT
A/P: 35yo  POD#1 s/p rLTCS c/b sPEC by transaminitis  Patient is stable and doing well post-operatively.

## 2019-12-26 NOTE — PROGRESS NOTE ADULT - PROBLEM SELECTOR PLAN 1
-pt s/p MgSO4 for seizure ppx  - Continue regular diet  - Increase ambulation.  - Continue motrin, tylenol.   -PCEA prematurely discontinued 2/2 epi catheter dislodgment. Pt cannot tolerate oxycodone 2/2 hallucinations. Pt ordered for PO Dilaudid 2mg for severe pain pRN    Nelly Vela PGY-3

## 2019-12-26 NOTE — PROGRESS NOTE ADULT - ATTENDING COMMENTS
Patient doing well, no complaints, out of bed.   No HA, CP, SOB  No heavy vaginal bleeding (VB)/normal lochia  ICU Vital Signs Last 24 Hrs  T(C): 36.7 (26 Dec 2019 09:49), Max: 37.1 (25 Dec 2019 16:30))  HR: 92 (26 Dec 2019 09:49) (86 - 115)  BP: 125/82 (26 Dec 2019 09:49) (120/77 - 143/85)  RR: 18 (26 Dec 2019 09:49) (18 - 20)  SpO2: 99% (26 Dec 2019 09:49) (97% - 100%)                          9.6    14.79 )-----------( 324      ( 26 Dec 2019 06:53 )             30.9   12-26    139  |  101  |  <4<L>  ----------------------------<  96  3.1<L>   |  24  |  0.49<L>    Ca    7.5<L>      26 Dec 2019 07:32  Mg     4.5     12-26    TPro  5.8<L>  /  Alb  2.7<L>  /  TBili  0.3  /  DBili  x   /  AST  113<H>  /  ALT  152<H>  /  AlkPhos  123<H>  12-26      POD # 1, C/S repeat for noted FHR changes, now w pp preeclampsia abd transaminitis  stable  breast feeding  Epidural out on dilaudid and and NSAIDs  discussed PEC, repeat C/S risk    Patient seen and evaluated by me. I agree with resident note unless otherwise stated.   Routine postpartum care, regular diet as tolerated, ambulate and pain control as needed.     MANOHAR Mcguire MD  Attending

## 2019-12-27 LAB
ALBUMIN SERPL ELPH-MCNC: 2.6 G/DL — LOW (ref 3.3–5)
ALP SERPL-CCNC: 115 U/L — SIGNIFICANT CHANGE UP (ref 40–120)
ALT FLD-CCNC: 120 U/L — HIGH (ref 10–45)
ANION GAP SERPL CALC-SCNC: 11 MMOL/L — SIGNIFICANT CHANGE UP (ref 5–17)
APTT BLD: 30 SEC — SIGNIFICANT CHANGE UP (ref 27.5–36.3)
AST SERPL-CCNC: 65 U/L — HIGH (ref 10–40)
BASOPHILS # BLD AUTO: 0.03 K/UL — SIGNIFICANT CHANGE UP (ref 0–0.2)
BASOPHILS NFR BLD AUTO: 0.2 % — SIGNIFICANT CHANGE UP (ref 0–2)
BILIRUB SERPL-MCNC: 0.3 MG/DL — SIGNIFICANT CHANGE UP (ref 0.2–1.2)
BUN SERPL-MCNC: 5 MG/DL — LOW (ref 7–23)
CALCIUM SERPL-MCNC: 8.3 MG/DL — LOW (ref 8.4–10.5)
CHLORIDE SERPL-SCNC: 103 MMOL/L — SIGNIFICANT CHANGE UP (ref 96–108)
CO2 SERPL-SCNC: 22 MMOL/L — SIGNIFICANT CHANGE UP (ref 22–31)
CREAT SERPL-MCNC: 0.48 MG/DL — LOW (ref 0.5–1.3)
EOSINOPHIL # BLD AUTO: 0.1 K/UL — SIGNIFICANT CHANGE UP (ref 0–0.5)
EOSINOPHIL NFR BLD AUTO: 0.8 % — SIGNIFICANT CHANGE UP (ref 0–6)
FIBRINOGEN PPP-MCNC: 824 MG/DL — HIGH (ref 350–510)
GLUCOSE SERPL-MCNC: 66 MG/DL — LOW (ref 70–99)
HCT VFR BLD CALC: 30.2 % — LOW (ref 34.5–45)
HGB BLD-MCNC: 9.4 G/DL — LOW (ref 11.5–15.5)
IMM GRANULOCYTES NFR BLD AUTO: 0.7 % — SIGNIFICANT CHANGE UP (ref 0–1.5)
INR BLD: 0.99 RATIO — SIGNIFICANT CHANGE UP (ref 0.88–1.16)
LDH SERPL L TO P-CCNC: 182 U/L — SIGNIFICANT CHANGE UP (ref 50–242)
LYMPHOCYTES # BLD AUTO: 2.95 K/UL — SIGNIFICANT CHANGE UP (ref 1–3.3)
LYMPHOCYTES # BLD AUTO: 24.6 % — SIGNIFICANT CHANGE UP (ref 13–44)
MCHC RBC-ENTMCNC: 21.4 PG — LOW (ref 27–34)
MCHC RBC-ENTMCNC: 31.1 GM/DL — LOW (ref 32–36)
MCV RBC AUTO: 68.8 FL — LOW (ref 80–100)
MONOCYTES # BLD AUTO: 1.1 K/UL — HIGH (ref 0–0.9)
MONOCYTES NFR BLD AUTO: 9.2 % — SIGNIFICANT CHANGE UP (ref 2–14)
NEUTROPHILS # BLD AUTO: 7.74 K/UL — HIGH (ref 1.8–7.4)
NEUTROPHILS NFR BLD AUTO: 64.5 % — SIGNIFICANT CHANGE UP (ref 43–77)
NRBC # BLD: 0 /100 WBCS — SIGNIFICANT CHANGE UP (ref 0–0)
PLATELET # BLD AUTO: 320 K/UL — SIGNIFICANT CHANGE UP (ref 150–400)
POTASSIUM SERPL-MCNC: 3.7 MMOL/L — SIGNIFICANT CHANGE UP (ref 3.5–5.3)
POTASSIUM SERPL-SCNC: 3.7 MMOL/L — SIGNIFICANT CHANGE UP (ref 3.5–5.3)
PROT SERPL-MCNC: 5.6 G/DL — LOW (ref 6–8.3)
PROTHROM AB SERPL-ACNC: 11.4 SEC — SIGNIFICANT CHANGE UP (ref 10–12.9)
RBC # BLD: 4.39 M/UL — SIGNIFICANT CHANGE UP (ref 3.8–5.2)
RBC # FLD: 16.1 % — HIGH (ref 10.3–14.5)
SODIUM SERPL-SCNC: 136 MMOL/L — SIGNIFICANT CHANGE UP (ref 135–145)
URATE SERPL-MCNC: 5.3 MG/DL — SIGNIFICANT CHANGE UP (ref 2.5–7)
WBC # BLD: 12.01 K/UL — HIGH (ref 3.8–10.5)
WBC # FLD AUTO: 12.01 K/UL — HIGH (ref 3.8–10.5)

## 2019-12-27 RX ADMIN — Medication 600 MILLIGRAM(S): at 00:14

## 2019-12-27 RX ADMIN — Medication 975 MILLIGRAM(S): at 08:10

## 2019-12-27 RX ADMIN — Medication 600 MILLIGRAM(S): at 01:00

## 2019-12-27 RX ADMIN — Medication 975 MILLIGRAM(S): at 03:29

## 2019-12-27 RX ADMIN — HYDROMORPHONE HYDROCHLORIDE 2 MILLIGRAM(S): 2 INJECTION INTRAMUSCULAR; INTRAVENOUS; SUBCUTANEOUS at 10:50

## 2019-12-27 RX ADMIN — Medication 975 MILLIGRAM(S): at 21:41

## 2019-12-27 RX ADMIN — Medication 975 MILLIGRAM(S): at 08:53

## 2019-12-27 RX ADMIN — Medication 975 MILLIGRAM(S): at 02:59

## 2019-12-27 RX ADMIN — Medication 600 MILLIGRAM(S): at 18:59

## 2019-12-27 RX ADMIN — HEPARIN SODIUM 5000 UNIT(S): 5000 INJECTION INTRAVENOUS; SUBCUTANEOUS at 22:12

## 2019-12-27 RX ADMIN — HYDROMORPHONE HYDROCHLORIDE 2 MILLIGRAM(S): 2 INJECTION INTRAMUSCULAR; INTRAVENOUS; SUBCUTANEOUS at 17:39

## 2019-12-27 RX ADMIN — Medication 600 MILLIGRAM(S): at 06:20

## 2019-12-27 RX ADMIN — Medication 600 MILLIGRAM(S): at 12:10

## 2019-12-27 RX ADMIN — HYDROMORPHONE HYDROCHLORIDE 2 MILLIGRAM(S): 2 INJECTION INTRAMUSCULAR; INTRAVENOUS; SUBCUTANEOUS at 02:59

## 2019-12-27 RX ADMIN — HYDROMORPHONE HYDROCHLORIDE 2 MILLIGRAM(S): 2 INJECTION INTRAMUSCULAR; INTRAVENOUS; SUBCUTANEOUS at 23:31

## 2019-12-27 RX ADMIN — HYDROMORPHONE HYDROCHLORIDE 2 MILLIGRAM(S): 2 INJECTION INTRAMUSCULAR; INTRAVENOUS; SUBCUTANEOUS at 00:00

## 2019-12-27 RX ADMIN — Medication 600 MILLIGRAM(S): at 18:31

## 2019-12-27 RX ADMIN — Medication 600 MILLIGRAM(S): at 23:43

## 2019-12-27 RX ADMIN — HYDROMORPHONE HYDROCHLORIDE 2 MILLIGRAM(S): 2 INJECTION INTRAMUSCULAR; INTRAVENOUS; SUBCUTANEOUS at 22:11

## 2019-12-27 RX ADMIN — HYDROMORPHONE HYDROCHLORIDE 2 MILLIGRAM(S): 2 INJECTION INTRAMUSCULAR; INTRAVENOUS; SUBCUTANEOUS at 16:18

## 2019-12-27 RX ADMIN — HEPARIN SODIUM 5000 UNIT(S): 5000 INJECTION INTRAVENOUS; SUBCUTANEOUS at 10:45

## 2019-12-27 RX ADMIN — Medication 600 MILLIGRAM(S): at 12:48

## 2019-12-27 RX ADMIN — Medication 975 MILLIGRAM(S): at 20:58

## 2019-12-27 RX ADMIN — Medication 975 MILLIGRAM(S): at 16:00

## 2019-12-27 RX ADMIN — Medication 600 MILLIGRAM(S): at 05:51

## 2019-12-27 RX ADMIN — Medication 975 MILLIGRAM(S): at 15:44

## 2019-12-27 RX ADMIN — HYDROMORPHONE HYDROCHLORIDE 2 MILLIGRAM(S): 2 INJECTION INTRAMUSCULAR; INTRAVENOUS; SUBCUTANEOUS at 10:41

## 2019-12-27 NOTE — PROGRESS NOTE ADULT - ASSESSMENT
A/P: 33yo  POD#2 s/p rLTCS c/b sPEC by transaminitis.  Patient is stable and doing well post-operatively.

## 2019-12-27 NOTE — PROGRESS NOTE ADULT - SUBJECTIVE AND OBJECTIVE BOX
OB Postpartum Note: Repeat  Delivery, POD#2    S: Pt seen at [facility]. Her pain is well controlled. She is tolerating a regular diet and passing flatus. Voiding spontaneously and ambulating without difficulty. Denies N/V. Denies HA, blurry vsion, RUQ/epigastric pain. Denies CP/SOB/lightheadedness/dizziness.     O:   Vitals:  Vital Signs Last 24 Hrs  T(C): 36.8 (27 Dec 2019 05:31), Max: 36.8 (27 Dec 2019 05:31)  T(F): 98.2 (27 Dec 2019 05:31), Max: 98.2 (27 Dec 2019 05:31)  HR: 94 (27 Dec 2019 05:31) (80 - 97)  BP: 128/78 (27 Dec 2019 05:31) (115/76 - 128/78)  BP(mean): --  RR: 18 (27 Dec 2019 05:31) (18 - 18)  SpO2: 97% (27 Dec 2019 05:31) (97% - 99%)    MEDICATIONS  (STANDING):  acetaminophen   Tablet .. 975 milliGRAM(s) Oral <User Schedule>  dextrose 5% + lactated ringers. 1000 milliLiter(s) (999 mL/Hr) IV Continuous <Continuous>  diphtheria/tetanus/pertussis (acellular) Vaccine (ADAcel) 0.5 milliLiter(s) IntraMuscular once  heparin  Injectable 5000 Unit(s) SubCutaneous every 12 hours  ibuprofen  Tablet. 600 milliGRAM(s) Oral every 6 hours  lactated ringers. 1000 milliLiter(s) (125 mL/Hr) IV Continuous <Continuous>  oxytocin Infusion 333.333 milliUNIT(s)/Min (1000 mL/Hr) IV Continuous <Continuous>    MEDICATIONS  (PRN):  dexAMETHasone  Injectable 4 milliGRAM(s) IV Push every 6 hours PRN Nausea  diphenhydrAMINE 25 milliGRAM(s) Oral every 6 hours PRN Itching  glycerin Suppository - Adult 1 Suppository(s) Rectal at bedtime PRN Constipation  HYDROmorphone   Tablet 2 milliGRAM(s) Oral every 6 hours PRN Severe Pain (7 - 10)  lanolin Ointment 1 Application(s) Topical every 6 hours PRN Sore Nipples  magnesium hydroxide Suspension 30 milliLiter(s) Oral two times a day PRN Constipation  naloxone Injectable 0.1 milliGRAM(s) IV Push every 3 minutes PRN For ANY of the following changes in patient status:  A. RR LESS THAN 10 breaths per minute, B. Oxygen saturation LESS THAN 90%, C. Sedation score of 6  ondansetron Injectable 4 milliGRAM(s) IV Push every 6 hours PRN Nausea  simethicone 80 milliGRAM(s) Chew every 4 hours PRN Gas      Labs:  Blood type: O Positive  Rubella IgG: RPR: Negative                          9.6<L>   14.79<H> >-----------< 324    (  @ 06:53 )             30.9<L>                        11.3<L>   10.73<H> >-----------< 316    (  @ 06:02 )             36.0                        11.0<L>   8.14 >-----------< 348    (  @ 15:04 )             36.1    19 @ 07:32      139  |  101  |  <4<L>  ----------------------------<  96  3.1<L>   |  24  |  0.49<L>    19 @ 06:02      140  |  104  |  <4<L>  ----------------------------<  104<H>  4.2   |  23  |  0.54        Ca    7.5<L>      26 Dec 2019 07:32  Ca    9.0      25 Dec 2019 06:02  Mg     4.5<H>     -  Mg     5.3<H>       Mg     4.8<H>     -25  Mg     3.9<H>         TPro  5.8<L>  /  Alb  2.7<L>  /  TBili  0.3  /  DBili  x   /  AST  113<H>  /  ALT  152<H>  /  AlkPhos  123<H>  19 @ 07:32  TPro  6.4  /  Alb  2.8<L>  /  TBili  0.7  /  DBili  x   /  AST  163<H>  /  ALT  157<H>  /  AlkPhos  142<H>  19 @ 06:02          PE:  General: NAD  Abdomen: mildly distended,appropriately tender  Incision: dressing in place, pt preferred that I not remove it at this time as she was breastfeeding the baby.  Extremities: SCDs in place, no erythema

## 2019-12-27 NOTE — PROGRESS NOTE ADULT - PROBLEM SELECTOR PLAN 1
-pt s/p MgSO4 for seizure ppx  - Continue regular diet  - Increase ambulation.  - Continue motrin, tylenol. PO Dilaudid 2mg for severe pain pRN    Nelly Vela PGY-3

## 2019-12-28 ENCOUNTER — TRANSCRIPTION ENCOUNTER (OUTPATIENT)
Age: 34
End: 2019-12-28

## 2019-12-28 VITALS
OXYGEN SATURATION: 99 % | RESPIRATION RATE: 18 BRPM | SYSTOLIC BLOOD PRESSURE: 129 MMHG | HEART RATE: 96 BPM | TEMPERATURE: 98 F | DIASTOLIC BLOOD PRESSURE: 76 MMHG

## 2019-12-28 DIAGNOSIS — Z98.891 HISTORY OF UTERINE SCAR FROM PREVIOUS SURGERY: ICD-10-CM

## 2019-12-28 LAB
ALBUMIN SERPL ELPH-MCNC: 2.8 G/DL — LOW (ref 3.3–5)
ALP SERPL-CCNC: 123 U/L — HIGH (ref 40–120)
ALT FLD-CCNC: 89 U/L — HIGH (ref 10–45)
ANION GAP SERPL CALC-SCNC: 11 MMOL/L — SIGNIFICANT CHANGE UP (ref 5–17)
APTT BLD: 31.6 SEC — SIGNIFICANT CHANGE UP (ref 27.5–36.3)
AST SERPL-CCNC: 48 U/L — HIGH (ref 10–40)
BASOPHILS # BLD AUTO: 0.03 K/UL — SIGNIFICANT CHANGE UP (ref 0–0.2)
BASOPHILS NFR BLD AUTO: 0.3 % — SIGNIFICANT CHANGE UP (ref 0–2)
BILIRUB SERPL-MCNC: 0.3 MG/DL — SIGNIFICANT CHANGE UP (ref 0.2–1.2)
BUN SERPL-MCNC: 6 MG/DL — LOW (ref 7–23)
CALCIUM SERPL-MCNC: 8.9 MG/DL — SIGNIFICANT CHANGE UP (ref 8.4–10.5)
CHLORIDE SERPL-SCNC: 101 MMOL/L — SIGNIFICANT CHANGE UP (ref 96–108)
CO2 SERPL-SCNC: 24 MMOL/L — SIGNIFICANT CHANGE UP (ref 22–31)
CREAT SERPL-MCNC: 0.48 MG/DL — LOW (ref 0.5–1.3)
EOSINOPHIL # BLD AUTO: 0.14 K/UL — SIGNIFICANT CHANGE UP (ref 0–0.5)
EOSINOPHIL NFR BLD AUTO: 1.4 % — SIGNIFICANT CHANGE UP (ref 0–6)
FIBRINOGEN PPP-MCNC: 888 MG/DL — HIGH (ref 350–510)
GLUCOSE SERPL-MCNC: 66 MG/DL — LOW (ref 70–99)
HCT VFR BLD CALC: 32.4 % — LOW (ref 34.5–45)
HGB BLD-MCNC: 9.7 G/DL — LOW (ref 11.5–15.5)
IMM GRANULOCYTES NFR BLD AUTO: 0.5 % — SIGNIFICANT CHANGE UP (ref 0–1.5)
INR BLD: 1.03 RATIO — SIGNIFICANT CHANGE UP (ref 0.88–1.16)
LDH SERPL L TO P-CCNC: 171 U/L — SIGNIFICANT CHANGE UP (ref 50–242)
LYMPHOCYTES # BLD AUTO: 2.92 K/UL — SIGNIFICANT CHANGE UP (ref 1–3.3)
LYMPHOCYTES # BLD AUTO: 28.6 % — SIGNIFICANT CHANGE UP (ref 13–44)
MAGNESIUM SERPL-MCNC: 1.6 MG/DL — SIGNIFICANT CHANGE UP (ref 1.6–2.6)
MCHC RBC-ENTMCNC: 21 PG — LOW (ref 27–34)
MCHC RBC-ENTMCNC: 29.9 GM/DL — LOW (ref 32–36)
MCV RBC AUTO: 70 FL — LOW (ref 80–100)
MONOCYTES # BLD AUTO: 0.95 K/UL — HIGH (ref 0–0.9)
MONOCYTES NFR BLD AUTO: 9.3 % — SIGNIFICANT CHANGE UP (ref 2–14)
NEUTROPHILS # BLD AUTO: 6.11 K/UL — SIGNIFICANT CHANGE UP (ref 1.8–7.4)
NEUTROPHILS NFR BLD AUTO: 59.9 % — SIGNIFICANT CHANGE UP (ref 43–77)
NRBC # BLD: 0 /100 WBCS — SIGNIFICANT CHANGE UP (ref 0–0)
PLATELET # BLD AUTO: 387 K/UL — SIGNIFICANT CHANGE UP (ref 150–400)
POTASSIUM SERPL-MCNC: 3.8 MMOL/L — SIGNIFICANT CHANGE UP (ref 3.5–5.3)
POTASSIUM SERPL-SCNC: 3.8 MMOL/L — SIGNIFICANT CHANGE UP (ref 3.5–5.3)
PROT SERPL-MCNC: 6.2 G/DL — SIGNIFICANT CHANGE UP (ref 6–8.3)
PROTHROM AB SERPL-ACNC: 11.7 SEC — SIGNIFICANT CHANGE UP (ref 10–12.9)
RBC # BLD: 4.63 M/UL — SIGNIFICANT CHANGE UP (ref 3.8–5.2)
RBC # FLD: 16 % — HIGH (ref 10.3–14.5)
SODIUM SERPL-SCNC: 136 MMOL/L — SIGNIFICANT CHANGE UP (ref 135–145)
URATE SERPL-MCNC: 4.9 MG/DL — SIGNIFICANT CHANGE UP (ref 2.5–7)
WBC # BLD: 10.2 K/UL — SIGNIFICANT CHANGE UP (ref 3.8–10.5)
WBC # FLD AUTO: 10.2 K/UL — SIGNIFICANT CHANGE UP (ref 3.8–10.5)

## 2019-12-28 PROCEDURE — 86850 RBC ANTIBODY SCREEN: CPT

## 2019-12-28 PROCEDURE — 83735 ASSAY OF MAGNESIUM: CPT

## 2019-12-28 PROCEDURE — 83615 LACTATE (LD) (LDH) ENZYME: CPT

## 2019-12-28 PROCEDURE — C1889: CPT

## 2019-12-28 PROCEDURE — 85027 COMPLETE CBC AUTOMATED: CPT

## 2019-12-28 PROCEDURE — 86900 BLOOD TYPING SEROLOGIC ABO: CPT

## 2019-12-28 PROCEDURE — 59050 FETAL MONITOR W/REPORT: CPT

## 2019-12-28 PROCEDURE — 85384 FIBRINOGEN ACTIVITY: CPT

## 2019-12-28 PROCEDURE — 85730 THROMBOPLASTIN TIME PARTIAL: CPT

## 2019-12-28 PROCEDURE — G0463: CPT

## 2019-12-28 PROCEDURE — 80053 COMPREHEN METABOLIC PANEL: CPT

## 2019-12-28 PROCEDURE — 85610 PROTHROMBIN TIME: CPT

## 2019-12-28 PROCEDURE — 86901 BLOOD TYPING SEROLOGIC RH(D): CPT

## 2019-12-28 PROCEDURE — 84550 ASSAY OF BLOOD/URIC ACID: CPT

## 2019-12-28 PROCEDURE — 86780 TREPONEMA PALLIDUM: CPT

## 2019-12-28 PROCEDURE — 59025 FETAL NON-STRESS TEST: CPT

## 2019-12-28 RX ORDER — HYDROMORPHONE HYDROCHLORIDE 2 MG/ML
1 INJECTION INTRAMUSCULAR; INTRAVENOUS; SUBCUTANEOUS
Qty: 0 | Refills: 0 | DISCHARGE
Start: 2019-12-28

## 2019-12-28 RX ORDER — ACETAMINOPHEN 500 MG
3 TABLET ORAL
Qty: 0 | Refills: 0 | DISCHARGE
Start: 2019-12-28

## 2019-12-28 RX ORDER — IBUPROFEN 200 MG
1 TABLET ORAL
Qty: 0 | Refills: 0 | DISCHARGE
Start: 2019-12-28

## 2019-12-28 RX ADMIN — Medication 975 MILLIGRAM(S): at 03:33

## 2019-12-28 RX ADMIN — Medication 975 MILLIGRAM(S): at 11:32

## 2019-12-28 RX ADMIN — Medication 975 MILLIGRAM(S): at 02:52

## 2019-12-28 RX ADMIN — Medication 600 MILLIGRAM(S): at 11:37

## 2019-12-28 RX ADMIN — HEPARIN SODIUM 5000 UNIT(S): 5000 INJECTION INTRAVENOUS; SUBCUTANEOUS at 09:27

## 2019-12-28 RX ADMIN — Medication 600 MILLIGRAM(S): at 06:50

## 2019-12-28 RX ADMIN — Medication 600 MILLIGRAM(S): at 00:18

## 2019-12-28 RX ADMIN — HYDROMORPHONE HYDROCHLORIDE 2 MILLIGRAM(S): 2 INJECTION INTRAMUSCULAR; INTRAVENOUS; SUBCUTANEOUS at 09:48

## 2019-12-28 RX ADMIN — HYDROMORPHONE HYDROCHLORIDE 2 MILLIGRAM(S): 2 INJECTION INTRAMUSCULAR; INTRAVENOUS; SUBCUTANEOUS at 04:09

## 2019-12-28 RX ADMIN — HYDROMORPHONE HYDROCHLORIDE 2 MILLIGRAM(S): 2 INJECTION INTRAMUSCULAR; INTRAVENOUS; SUBCUTANEOUS at 04:55

## 2019-12-28 RX ADMIN — Medication 600 MILLIGRAM(S): at 06:10

## 2019-12-28 RX ADMIN — HYDROMORPHONE HYDROCHLORIDE 2 MILLIGRAM(S): 2 INJECTION INTRAMUSCULAR; INTRAVENOUS; SUBCUTANEOUS at 11:34

## 2019-12-28 RX ADMIN — Medication 975 MILLIGRAM(S): at 09:26

## 2019-12-28 NOTE — PROGRESS NOTE ADULT - ASSESSMENT
A/P: 33yo  POD#3 s/p rLTCS c/b sPEC by transaminitis now downternding Patient is stable and doing well post-operatively.

## 2019-12-28 NOTE — PROGRESS NOTE ADULT - PROBLEM SELECTOR PLAN 1
-pt s/p MgSO4 for seizure ppx  - Continue regular diet  - Increase ambulation.  - Continue motrin, tylenol. PO Dilaudid 2mg for severe pain pRN  -transaminitis downtrending    Dispo: discharge planning    Nelly Vela PGY-3

## 2019-12-28 NOTE — PROGRESS NOTE ADULT - SUBJECTIVE AND OBJECTIVE BOX
Postpartum Note,  Section  Patient is a  34y post operative day #3 s/p r/p c/s, severe preeclampsia (transaminitis)    Subjective: Patient feels well.  Is out of bed, tolerating regular diet.  Patient is voiding.  Pain is well-controlled.  Lochia is within normal limits. She is breastfeeding.  She denies headache/changes in vision/right upper quadrant pain.  She denies nausea/vomiting.       Physical exam:    Vital Signs Last 24 Hrs  T(C): 36.8 (28 Dec 2019 09:51), Max: 37 (27 Dec 2019 23:58)  T(F): 98.2 (28 Dec 2019 09:51), Max: 98.6 (27 Dec 2019 23:58)  HR: 96 (28 Dec 2019 09:51) (83 - 97)  BP: 129/76 (28 Dec 2019 09:51) (129/76 - 138/85)  BP(mean): --  RR: 18 (28 Dec 2019 09:51) (18 - 18)  SpO2: 99% (28 Dec 2019 09:51) (98% - 99%)    Gen: NAD  CV: +S1+S2, regular rate and rhythm  Pulm: CTA b/l  Abdomen: Soft, nontender, no distension , fundus firm  Incision: Clean, dry, and intact   Ext: warm well perfused b/l, nontender b/l     LABS:                        9.7    10.20 )-----------( 387      ( 28 Dec 2019 06:58 )             32.4                         9.4    12.01 )-----------( 320      ( 27 Dec 2019 07:02 )             30.2     Magnesium, Serum: 1.6 mg/dL ( @ 06:41)    19 @ 06:41      136  |  101  |  6<L>  ----------------------------<  66<L>  3.8   |  24  |  0.48<L>    19 @ 06:56      136  |  103  |  5<L>  ----------------------------<  66<L>  3.7   |  22  |  0.48<L>    19 @ 07:32      139  |  101  |  <4<L>  ----------------------------<  96  3.1<L>   |  24  |  0.49<L>        Ca    8.9      28 Dec 2019 06:41  Ca    8.3<L>      27 Dec 2019 06:56  Ca    7.5<L>      26 Dec 2019 07:32  Mg     1.6     -  Mg     4.5<H>     12-  Mg     5.3<H>       Mg     4.8<H>     12-  Mg     3.9<H>         TPro  6.2  /  Alb  2.8<L>  /  TBili  0.3  /  DBili  x   /  AST  48<H>  /  ALT  89<H>  /  AlkPhos  123<H>  19 @ 06:41  TPro  5.6<L>  /  Alb  2.6<L>  /  TBili  0.3  /  DBili  x   /  AST  65<H>  /  ALT  120<H>  /  AlkPhos  115  19 @ 06:56  TPro  5.8<L>  /  Alb  2.7<L>  /  TBili  0.3  /  DBili  x   /  AST  113<H>  /  ALT  152<H>  /  AlkPhos  123<H>  19 @ 07:32        Allergies    No Known Allergies    Intolerances      MEDICATIONS  (STANDING):  acetaminophen   Tablet .. 975 milliGRAM(s) Oral <User Schedule>  dextrose 5% + lactated ringers. 1000 milliLiter(s) (999 mL/Hr) IV Continuous <Continuous>  diphtheria/tetanus/pertussis (acellular) Vaccine (ADAcel) 0.5 milliLiter(s) IntraMuscular once  heparin  Injectable 5000 Unit(s) SubCutaneous every 12 hours  ibuprofen  Tablet. 600 milliGRAM(s) Oral every 6 hours  lactated ringers. 1000 milliLiter(s) (125 mL/Hr) IV Continuous <Continuous>  oxytocin Infusion 333.333 milliUNIT(s)/Min (1000 mL/Hr) IV Continuous <Continuous>    MEDICATIONS  (PRN):  dexAMETHasone  Injectable 4 milliGRAM(s) IV Push every 6 hours PRN Nausea  diphenhydrAMINE 25 milliGRAM(s) Oral every 6 hours PRN Itching  glycerin Suppository - Adult 1 Suppository(s) Rectal at bedtime PRN Constipation  HYDROmorphone   Tablet 2 milliGRAM(s) Oral every 6 hours PRN Severe Pain (7 - 10)  lanolin Ointment 1 Application(s) Topical every 6 hours PRN Sore Nipples  magnesium hydroxide Suspension 30 milliLiter(s) Oral two times a day PRN Constipation  naloxone Injectable 0.1 milliGRAM(s) IV Push every 3 minutes PRN For ANY of the following changes in patient status:  A. RR LESS THAN 10 breaths per minute, B. Oxygen saturation LESS THAN 90%, C. Sedation score of 6  ondansetron Injectable 4 milliGRAM(s) IV Push every 6 hours PRN Nausea  simethicone 80 milliGRAM(s) Chew every 4 hours PRN Gas        A/P: Pt is a 33 yo   POD #3  s/p r/p  section for category II fetal tracing, severe preeclampsia  1. ID: no signs/symptoms infection  2. Cards: BPs WNL, LFTs normalizing. s/p 24 hours magnesium  3. Heme: no signs/symptoms anemia  4. Uro: voiding spontaneously  5. GI: tolerating regular diet  6. Neuro: po analgesia prn.  pt requiring dilaudid 2 mg.  recommend weaning and alternating with tylenol/ibuprofen  7. OB: breast feeding  8. dispo: d/c home, to f/u in office in 1 week for incision check.  pt to monitor BPs at home bid  all questions/concerns of pt and her partner addressed    Osiris Batres MD

## 2019-12-28 NOTE — PROGRESS NOTE ADULT - SUBJECTIVE AND OBJECTIVE BOX
OB Postpartum Note: Repeat  Delivery, POD#3    S: Pt seen at [facility]. Her pain is well controlled. She is tolerating a regular diet and passing flatus. Voiding spontaneously and ambulating without difficulty. Denies N/V. Denies HA, blurry vsion, RUQ/epigastric pain. Denies CP/SOB/lightheadedness/dizziness.     O:   Vital Signs Last 24 Hrs  T(C): 36.8 (28 Dec 2019 05:35), Max: 37 (27 Dec 2019 23:58)  T(F): 98.3 (28 Dec 2019 05:35), Max: 98.6 (27 Dec 2019 23:58)  HR: 89 (28 Dec 2019 05:35) (83 - 97)  BP: 135/85 (28 Dec 2019 05:35) (124/84 - 138/85)  BP(mean): --  RR: 18 (28 Dec 2019 05:35) (16 - 18)  SpO2: 98% (28 Dec 2019 05:35) (98% - 100%)    I&O's Detail                            9.7    10.20 )-----------( 387      ( 28 Dec 2019 06:58 )             32.4           136  |  101  |  6<L>  ----------------------------<  66<L>  3.8   |  24  |  0.48<L>    Ca    8.9      28 Dec 2019 06:41  Mg     1.6         TPro  6.2  /  Alb  2.8<L>  /  TBili  0.3  /  DBili  x   /  AST  48<H>  /  ALT  89<H>  /  AlkPhos  123<H>        CAPILLARY BLOOD GLUCOSE          LIVER FUNCTIONS - ( 28 Dec 2019 06:41 )  Alb: 2.8 g/dL / Pro: 6.2 g/dL / ALK PHOS: 123 U/L / ALT: 89 U/L / AST: 48 U/L / GGT: x             PT/INR - ( 28 Dec 2019 06:41 )   PT: 11.7 sec;   INR: 1.03 ratio         PTT - ( 28 Dec 2019 06:41 )  PTT:31.6 sec        PE:  General: NAD  Abdomen: mildly distended, appropriately tender  Incision: c/d/i  Extremities: SCDs in place, no erythema

## 2019-12-28 NOTE — DISCHARGE NOTE OB - CARE PLAN
Principal Discharge DX:	 delivery delivered  Goal:	return to normal activities as tolerated  Assessment and plan of treatment:	follow up in office in 1 week for incision check, pelvic rest  Secondary Diagnosis:	Severe pre-eclampsia in third trimester  Goal:	monitor blood pressures 2x a day

## 2019-12-28 NOTE — DISCHARGE NOTE OB - MEDICATION SUMMARY - MEDICATIONS TO TAKE
I will START or STAY ON the medications listed below when I get home from the hospital:    HYDROmorphone 2 mg oral tablet  -- 1 tab(s) by mouth every 6 hours, As needed, Severe Pain (7 - 10)  -- Indication: For Severe pain    ibuprofen 600 mg oral tablet  -- 1 tab(s) by mouth every 6 hours  -- Indication: For pain    acetaminophen 325 mg oral tablet  -- 3 tab(s) by mouth   -- Indication: For pain

## 2019-12-28 NOTE — DISCHARGE NOTE OB - HOSPITAL COURSE
Patient admitted 19 for induction of labor, due to category II fetal tracing repeat  section was performed 19.  Patient was diagnosed with severe preeclampsia due to transaminitis.  She is s/p treatment with magnesium x24 hours.  She reached all appropriate post-operative milestones and was discharged home in stable condition 19.  She was instructed to follow up in office in 1 week for incision check.

## 2019-12-28 NOTE — DISCHARGE NOTE OB - PATIENT PORTAL LINK FT
You can access the FollowMyHealth Patient Portal offered by [facility] by registering at the following website: [email or web address]. By joining [facility]’s FollowMyHealth portal, you will also be able to view your health information using other applications (apps) compatible with our system.

## 2019-12-28 NOTE — DISCHARGE NOTE OB - PLAN OF CARE
return to normal activities as tolerated follow up in office in 1 week for incision check, pelvic rest monitor blood pressures 2x a day

## 2019-12-28 NOTE — DISCHARGE NOTE OB - CARE PROVIDER_API CALL
Chana Mcguire)  OBSN  General  [address] [address]  Phone: [phone]  Fax: [phone]  Follow Up Time:

## 2019-12-30 ENCOUNTER — APPOINTMENT (OUTPATIENT)
Dept: OBGYN | Facility: CLINIC | Age: 34
End: 2019-12-30

## 2019-12-31 ENCOUNTER — APPOINTMENT (OUTPATIENT)
Dept: ANTEPARTUM | Facility: CLINIC | Age: 34
End: 2019-12-31

## 2020-01-04 ENCOUNTER — INPATIENT (INPATIENT)
Facility: HOSPITAL | Age: 35
LOS: 0 days | Discharge: HOME | DRG: 776 | End: 2020-01-05
Attending: INTERNAL MEDICINE | Admitting: INTERNAL MEDICINE
Payer: COMMERCIAL

## 2020-01-04 VITALS
SYSTOLIC BLOOD PRESSURE: 126 MMHG | RESPIRATION RATE: 20 BRPM | HEIGHT: 62 IN | HEART RATE: 134 BPM | OXYGEN SATURATION: 100 % | TEMPERATURE: 100 F | DIASTOLIC BLOOD PRESSURE: 65 MMHG

## 2020-01-04 DIAGNOSIS — J18.9 PNEUMONIA, UNSPECIFIED ORGANISM: ICD-10-CM

## 2020-01-04 DIAGNOSIS — A41.9 SEPSIS, UNSPECIFIED ORGANISM: ICD-10-CM

## 2020-01-04 DIAGNOSIS — R73.03 PREDIABETES: ICD-10-CM

## 2020-01-04 DIAGNOSIS — K08.499 PARTIAL LOSS OF TEETH DUE TO OTHER SPECIFIED CAUSE, UNSPECIFIED CLASS: Chronic | ICD-10-CM

## 2020-01-04 DIAGNOSIS — Z98.891 HISTORY OF UTERINE SCAR FROM PREVIOUS SURGERY: Chronic | ICD-10-CM

## 2020-01-04 LAB
ALBUMIN SERPL ELPH-MCNC: 3.5 G/DL — SIGNIFICANT CHANGE UP (ref 3.3–5)
ALP SERPL-CCNC: 96 U/L — SIGNIFICANT CHANGE UP (ref 40–120)
ALT FLD-CCNC: 29 U/L — SIGNIFICANT CHANGE UP (ref 10–45)
ANION GAP SERPL CALC-SCNC: 13 MMOL/L — SIGNIFICANT CHANGE UP (ref 5–17)
APPEARANCE UR: CLEAR — SIGNIFICANT CHANGE UP
APTT BLD: 35.7 SEC — SIGNIFICANT CHANGE UP (ref 27.5–36.3)
AST SERPL-CCNC: 47 U/L — HIGH (ref 10–40)
BASOPHILS # BLD AUTO: 0 K/UL — SIGNIFICANT CHANGE UP (ref 0–0.2)
BASOPHILS NFR BLD AUTO: 0 % — SIGNIFICANT CHANGE UP (ref 0–2)
BILIRUB SERPL-MCNC: 0.4 MG/DL — SIGNIFICANT CHANGE UP (ref 0.2–1.2)
BILIRUB UR-MCNC: NEGATIVE — SIGNIFICANT CHANGE UP
BUN SERPL-MCNC: 7 MG/DL — SIGNIFICANT CHANGE UP (ref 7–23)
CALCIUM SERPL-MCNC: 9.2 MG/DL — SIGNIFICANT CHANGE UP (ref 8.4–10.5)
CHLORIDE SERPL-SCNC: 100 MMOL/L — SIGNIFICANT CHANGE UP (ref 96–108)
CO2 SERPL-SCNC: 19 MMOL/L — LOW (ref 22–31)
COLOR SPEC: COLORLESS — SIGNIFICANT CHANGE UP
CREAT SERPL-MCNC: 0.48 MG/DL — LOW (ref 0.5–1.3)
DIFF PNL FLD: ABNORMAL
EOSINOPHIL # BLD AUTO: 0 K/UL — SIGNIFICANT CHANGE UP (ref 0–0.5)
EOSINOPHIL NFR BLD AUTO: 0 % — SIGNIFICANT CHANGE UP (ref 0–6)
FLU A RESULT: SIGNIFICANT CHANGE UP
FLU A RESULT: SIGNIFICANT CHANGE UP
FLUAV AG NPH QL: SIGNIFICANT CHANGE UP
FLUBV AG NPH QL: SIGNIFICANT CHANGE UP
GLUCOSE SERPL-MCNC: 105 MG/DL — HIGH (ref 70–99)
GLUCOSE UR QL: NEGATIVE — SIGNIFICANT CHANGE UP
HCT VFR BLD CALC: 34.8 % — SIGNIFICANT CHANGE UP (ref 34.5–45)
HGB BLD-MCNC: 10.9 G/DL — LOW (ref 11.5–15.5)
INR BLD: 1.46 RATIO — HIGH (ref 0.88–1.16)
KETONES UR-MCNC: NEGATIVE — SIGNIFICANT CHANGE UP
LACTATE BLDV-MCNC: 1.1 MMOL/L — SIGNIFICANT CHANGE UP (ref 0.7–2)
LEUKOCYTE ESTERASE UR-ACNC: ABNORMAL
LYMPHOCYTES # BLD AUTO: 0.84 K/UL — LOW (ref 1–3.3)
LYMPHOCYTES # BLD AUTO: 6 % — LOW (ref 13–44)
MCHC RBC-ENTMCNC: 21.3 PG — LOW (ref 27–34)
MCHC RBC-ENTMCNC: 31.3 GM/DL — LOW (ref 32–36)
MCV RBC AUTO: 68 FL — LOW (ref 80–100)
MONOCYTES # BLD AUTO: 0.56 K/UL — SIGNIFICANT CHANGE UP (ref 0–0.9)
MONOCYTES NFR BLD AUTO: 4 % — SIGNIFICANT CHANGE UP (ref 2–14)
NEUTROPHILS # BLD AUTO: 12.64 K/UL — HIGH (ref 1.8–7.4)
NEUTROPHILS NFR BLD AUTO: 87 % — HIGH (ref 43–77)
NITRITE UR-MCNC: NEGATIVE — SIGNIFICANT CHANGE UP
PH UR: 7 — SIGNIFICANT CHANGE UP (ref 5–8)
PLATELET # BLD AUTO: 498 K/UL — HIGH (ref 150–400)
POTASSIUM SERPL-MCNC: 4.4 MMOL/L — SIGNIFICANT CHANGE UP (ref 3.5–5.3)
POTASSIUM SERPL-SCNC: 4.4 MMOL/L — SIGNIFICANT CHANGE UP (ref 3.5–5.3)
PROT SERPL-MCNC: 7.6 G/DL — SIGNIFICANT CHANGE UP (ref 6–8.3)
PROT UR-MCNC: ABNORMAL
PROTHROM AB SERPL-ACNC: 16.8 SEC — HIGH (ref 10–12.9)
RBC # BLD: 5.12 M/UL — SIGNIFICANT CHANGE UP (ref 3.8–5.2)
RBC # FLD: 15.5 % — HIGH (ref 10.3–14.5)
RSV RESULT: SIGNIFICANT CHANGE UP
RSV RNA RESP QL NAA+PROBE: SIGNIFICANT CHANGE UP
SODIUM SERPL-SCNC: 132 MMOL/L — LOW (ref 135–145)
SP GR SPEC: 1.01 — LOW (ref 1.01–1.02)
UROBILINOGEN FLD QL: NEGATIVE — SIGNIFICANT CHANGE UP
WBC # BLD: 14.04 K/UL — HIGH (ref 3.8–10.5)
WBC # FLD AUTO: 14.04 K/UL — HIGH (ref 3.8–10.5)

## 2020-01-04 PROCEDURE — 99291 CRITICAL CARE FIRST HOUR: CPT

## 2020-01-04 PROCEDURE — 71045 X-RAY EXAM CHEST 1 VIEW: CPT | Mod: 26

## 2020-01-04 RX ORDER — AZITHROMYCIN 500 MG/1
TABLET, FILM COATED ORAL
Refills: 0 | Status: DISCONTINUED | OUTPATIENT
Start: 2020-01-04 | End: 2020-01-05

## 2020-01-04 RX ORDER — CEFTRIAXONE 500 MG/1
1000 INJECTION, POWDER, FOR SOLUTION INTRAMUSCULAR; INTRAVENOUS EVERY 24 HOURS
Refills: 0 | Status: DISCONTINUED | OUTPATIENT
Start: 2020-01-05 | End: 2020-01-05

## 2020-01-04 RX ORDER — AZITHROMYCIN 500 MG/1
500 TABLET, FILM COATED ORAL EVERY 24 HOURS
Refills: 0 | Status: DISCONTINUED | OUTPATIENT
Start: 2020-01-05 | End: 2020-01-05

## 2020-01-04 RX ORDER — IBUPROFEN 200 MG
3 TABLET ORAL
Qty: 0 | Refills: 0 | DISCHARGE

## 2020-01-04 RX ORDER — SODIUM CHLORIDE 9 MG/ML
1000 INJECTION INTRAMUSCULAR; INTRAVENOUS; SUBCUTANEOUS
Refills: 0 | Status: DISCONTINUED | OUTPATIENT
Start: 2020-01-04 | End: 2020-01-04

## 2020-01-04 RX ORDER — ACETAMINOPHEN 500 MG
650 TABLET ORAL ONCE
Refills: 0 | Status: COMPLETED | OUTPATIENT
Start: 2020-01-04 | End: 2020-01-04

## 2020-01-04 RX ORDER — ACETAMINOPHEN 500 MG
2 TABLET ORAL
Qty: 0 | Refills: 0 | DISCHARGE

## 2020-01-04 RX ORDER — SODIUM CHLORIDE 9 MG/ML
1000 INJECTION INTRAMUSCULAR; INTRAVENOUS; SUBCUTANEOUS
Refills: 0 | Status: DISCONTINUED | OUTPATIENT
Start: 2020-01-04 | End: 2020-01-05

## 2020-01-04 RX ORDER — SODIUM CHLORIDE 9 MG/ML
1000 INJECTION, SOLUTION INTRAVENOUS ONCE
Refills: 0 | Status: COMPLETED | OUTPATIENT
Start: 2020-01-04 | End: 2020-01-04

## 2020-01-04 RX ORDER — AZITHROMYCIN 500 MG/1
500 TABLET, FILM COATED ORAL ONCE
Refills: 0 | Status: COMPLETED | OUTPATIENT
Start: 2020-01-04 | End: 2020-01-04

## 2020-01-04 RX ORDER — SODIUM CHLORIDE 9 MG/ML
2700 INJECTION INTRAMUSCULAR; INTRAVENOUS; SUBCUTANEOUS ONCE
Refills: 0 | Status: COMPLETED | OUTPATIENT
Start: 2020-01-04 | End: 2020-01-04

## 2020-01-04 RX ORDER — IBUPROFEN 200 MG
600 TABLET ORAL EVERY 6 HOURS
Refills: 0 | Status: DISCONTINUED | OUTPATIENT
Start: 2020-01-04 | End: 2020-01-05

## 2020-01-04 RX ORDER — ACETAMINOPHEN 500 MG
650 TABLET ORAL EVERY 6 HOURS
Refills: 0 | Status: DISCONTINUED | OUTPATIENT
Start: 2020-01-04 | End: 2020-01-05

## 2020-01-04 RX ORDER — IBUPROFEN 200 MG
600 TABLET ORAL ONCE
Refills: 0 | Status: COMPLETED | OUTPATIENT
Start: 2020-01-04 | End: 2020-01-04

## 2020-01-04 RX ORDER — ENOXAPARIN SODIUM 100 MG/ML
40 INJECTION SUBCUTANEOUS DAILY
Refills: 0 | Status: DISCONTINUED | OUTPATIENT
Start: 2020-01-04 | End: 2020-01-05

## 2020-01-04 RX ORDER — HYDROMORPHONE HYDROCHLORIDE 2 MG/ML
2 INJECTION INTRAMUSCULAR; INTRAVENOUS; SUBCUTANEOUS EVERY 4 HOURS
Refills: 0 | Status: DISCONTINUED | OUTPATIENT
Start: 2020-01-04 | End: 2020-01-05

## 2020-01-04 RX ORDER — CEFTRIAXONE 500 MG/1
1000 INJECTION, POWDER, FOR SOLUTION INTRAMUSCULAR; INTRAVENOUS ONCE
Refills: 0 | Status: COMPLETED | OUTPATIENT
Start: 2020-01-04 | End: 2020-01-04

## 2020-01-04 RX ADMIN — Medication 650 MILLIGRAM(S): at 05:22

## 2020-01-04 RX ADMIN — Medication 600 MILLIGRAM(S): at 17:15

## 2020-01-04 RX ADMIN — Medication 600 MILLIGRAM(S): at 23:45

## 2020-01-04 RX ADMIN — SODIUM CHLORIDE 2700 MILLILITER(S): 9 INJECTION INTRAMUSCULAR; INTRAVENOUS; SUBCUTANEOUS at 03:15

## 2020-01-04 RX ADMIN — ENOXAPARIN SODIUM 40 MILLIGRAM(S): 100 INJECTION SUBCUTANEOUS at 12:29

## 2020-01-04 RX ADMIN — SODIUM CHLORIDE 1000 MILLILITER(S): 9 INJECTION, SOLUTION INTRAVENOUS at 06:46

## 2020-01-04 RX ADMIN — Medication 650 MILLIGRAM(S): at 22:38

## 2020-01-04 RX ADMIN — Medication 650 MILLIGRAM(S): at 20:22

## 2020-01-04 RX ADMIN — Medication 600 MILLIGRAM(S): at 16:45

## 2020-01-04 RX ADMIN — AZITHROMYCIN 250 MILLIGRAM(S): 500 TABLET, FILM COATED ORAL at 05:03

## 2020-01-04 RX ADMIN — Medication 600 MILLIGRAM(S): at 06:26

## 2020-01-04 RX ADMIN — CEFTRIAXONE 100 MILLIGRAM(S): 500 INJECTION, POWDER, FOR SOLUTION INTRAMUSCULAR; INTRAVENOUS at 04:20

## 2020-01-04 RX ADMIN — Medication 600 MILLIGRAM(S): at 22:37

## 2020-01-04 NOTE — H&P ADULT - HISTORY OF PRESENT ILLNESS
35 y/o/f w/ pmh of PreDm, 10 post  presents w/ cough and fevers. 33 y/o/f w/ pmh of PreDm, 10 post  presents w/ cough and fevers. Symptoms started yesterday with diffuse back pain, and pain upon deep inspiration. This am she was having 103 fevers which prompt her to come in. admits to some VALLES. no orthopnea, cp or palpitations. She just had her second daughter and planning to breast feed exclusively.

## 2020-01-04 NOTE — ED PROVIDER NOTE - PHYSICAL EXAMINATION
General: Well developed, well nourished, in no acute distress   HEENT: Normocephalic and atraumatic, Dry MM, Trachea midline.   Cardiac: Normal S1 and S2 w/ tachycardia. No MRG.  Pulmonary: Decreased breath sounds at R lung base. No increased WOB.   Abdominal: Soft, NTND  Neurologic: No focal sensory or motor deficits.  Musculoskeletal: No limited ROM.  Vascular: Warm and well perfused  Skin: Color appropriate for race.   Psychiatric: Appropriate mood and affect. No apparent risk to self or others.  Sivakumar Shirley M.D. PGY-2

## 2020-01-04 NOTE — ED PROVIDER NOTE - ATTENDING CONTRIBUTION TO CARE
Dr Hutton Note: 35yo F  POD#10 s/p  c section pw back pain and fever, Tmax 103, dry cough   Pt denies any abdominal pain, N/V/D, dysuria, nasal congestion, sore throat, no cp, no sob  No drainage from incision site   on arrival code sepsis  dry mucous membranes, tachy, lungs clear, abdomen nontender, c section incision clean dry, no drainage, no erythema no cva tenderness, LE- no edema no calf tenderness   plan for fluids, labs, cultures, antipyretics, cxr, Ua, Ucx   Concern for UTI vs PNA     obgyn called as pt post op 10 days       anticipate admission for sepsis      abx ordered after CXR +PNA  Pt aware --> will admit medicine Dr Hutton Note: 33yo F  POD#10 s/p  c section pw back pain and fever, Tmax 103, dry cough   Pt denies any abdominal pain, N/V/D, dysuria, nasal congestion, sore throat, no cp, no sob  No drainage from incision site   on arrival code sepsis  dry mucous membranes, tachy, lungs clear, abdomen nontender, c section incision clean dry, no drainage, no erythema no cva tenderness, LE- no edema no calf tenderness   plan for fluids, labs, cultures, antipyretics, cxr, Ua, Ucx   Concern for UTI vs PNA     obgyn called as pt post op 10 days       anticipate admission for sepsis-      abx ordered after CXR +PNA  Pt aware --> will admit medicine

## 2020-01-04 NOTE — ED PROVIDER NOTE - OBJECTIVE STATEMENT
34F  POD10 after . Pt states last night started to feel feverish and have back pain. States back pain in upper back, achy quality. NO abdominal pain No discharge from surgical wound. Denies Cough, CP/SOB, No sore throat/runny nose. No abd pain, no n/v/d.

## 2020-01-04 NOTE — H&P ADULT - NSHPPHYSICALEXAM_GEN_ALL_CORE
GENERAL: NAD, AAOx3  EYES: EOMI, PERRLA, conjunctiva and sclera clear  HEENT : Supple, No JVD, No LAD  Lungs: CTABL, No wheeze  HEART: Regular rate and rhythm; No rubs, or gallops  ABDOMEN: Soft, Nontender, Nondistended; Bowel sounds present  MSK:  2+ Peripheral Pulses, No clubbing, cyanosis, or edema  SKIN: No rashes or lesions  psych: cooperative, appropriate affect  Neuro: Alert and Oriented

## 2020-01-04 NOTE — H&P ADULT - NSHPREVIEWOFSYSTEMS_GEN_ALL_CORE
REVIEW OF SYSTEMS:    All systems were reviewed and are negative aside from pertinent positives noted in HPI

## 2020-01-04 NOTE — ED ADULT NURSE NOTE - OBJECTIVE STATEMENT
34 YOF A&OX3 with no pmh s/p  `10 days ago presents to ED for fever. pt states she gave birth 10 days ago via  due to preeclampsia and started having fevers the past few days. pt states has discomfort at surgical site but no signs of infection. no redness or discharge noted at site. pt states has been having chills. pt denies sob, chest pain, n/v/d, headaches, dizziness. blurry vision. safety maintained.

## 2020-01-04 NOTE — CONSULT NOTE ADULT - ASSESSMENT
33yo  POD#10 s/p rC/S, presents with fever and tachycardia. No SOB/CP. O2 sat 100% on room air. RVP negative. CXR prelim read significant for opacity concerning for pneumonia.     -Agree w/ initiating antibiotics for community-acquired pneumonia. If no improvement, consider hospital-acquired pneumonia as etiology  -Agree w/ admission to Medicine for treatment  -F/u final CXR read  -F/u Blood and urine cultures  -Antepartum team will follow     D/w Dr. Maynor Roman PGY-2

## 2020-01-04 NOTE — ED PROVIDER NOTE - NS ED ROS FT
REVIEW OF SYSTEMS:  General: +fever, +chills  HEENT: no headache, no vision changes  Cardiac: no chest pain, no palpitations  Respiratory: no cough, no shortness of breath  Gastrointestinal: no abdominal pain, no nausea, no vomiting, no diarrhea  Genitourinary: no hematuria, no dysuria, no urinary frequency  Extremities: +back pain. no extremity swelling, no extremity pain  Neuro: no focal weakness, no numbness/tingling of the extremities, no decreased sensation  Heme: no easy bleeding, no easy bruising  Skin: no jaundice,  no rashes, no lesions  All other ROS as documented in HPI  -Sivakumar Shirley, PGY-2

## 2020-01-04 NOTE — H&P ADULT - NSHPLABSRESULTS_GEN_ALL_CORE
LABS:                        10.9   14.04 )-----------( 498      ( 2020 03:36 )             34.8     01-04    132<L>  |  100  |  7   ----------------------------<  105<H>  4.4   |  19<L>  |  0.48<L>    Ca    9.2      2020 03:36    TPro  7.6  /  Alb  3.5  /  TBili  0.4  /  DBili  x   /  AST  47<H>  /  ALT  29  /  AlkPhos  96  01-04    PT/INR - ( 2020 03:41 )   PT: 16.8 sec;   INR: 1.46 ratio         PTT - ( 2020 03:41 )  PTT:35.7 sec  CAPILLARY BLOOD GLUCOSE            Urinalysis Basic - ( 2020 05:21 )    Color: Colorless / Appearance: Clear / S.008 / pH: x  Gluc: x / Ketone: Negative  / Bili: Negative / Urobili: Negative   Blood: x / Protein: Trace / Nitrite: Negative   Leuk Esterase: Large / RBC: 14 /hpf / WBC 43 /HPF   Sq Epi: x / Non Sq Epi: 4 /hpf / Bacteria: Negative        RADIOLOGY & ADDITIONAL TESTS:    Imaging Personally Reviewed:  [x] YES  [ ] NO    Consultant(s) Notes Reviewed:  [x] YES  [ ] NO    Care Discussed with Consultants/Other Providers [x] YES  [ ] NO

## 2020-01-04 NOTE — ED ADULT NURSE REASSESSMENT NOTE - NS ED NURSE REASSESS COMMENT FT1
pt appears to be in no acute distress. pt has elevated temperature and HR. MD Hutton made aware. pt awaiting lab results. safety maintained.

## 2020-01-04 NOTE — CONSULT NOTE ADULT - SUBJECTIVE AND OBJECTIVE BOX
R2 GYN ED CONSULT NOTE    HPI:  35yo  POD#10 s/p rC/S 2/2 NRFHT, presents c/o fever yesterday evening and mid-back pain on the right. Patient has been sweating profusely at night, and found her temperature to be 103 at home at around 6pm. Denies dysuria. Patient reports occasional cough since delivery. Her abdominal pain post-op has improved. Incision healing well, non-draining and without redness. She is only taking motrin prn at this time. Her vaginal bleeding has improved to spotting. Currently breast feeding. No changes in breast skin or breast pain. Denies dizziness, syncope, chest pain, palpitations, shortness of breath, purulent vaginal discharge.     In the ED today the patient presented w/ tachycardia and fever. Tmax 101.3.     Primary OB/GYN:    OBH:  GYNH: denies hx of fibroids, ov cysts, abnl paps, sti  PMSH:  MEDS: none  ALL: nkda  SOCH: denies t/e/d; safe at home  FAMH: denies fam hx of breast/uterine/ovarian/colon cancer    ROS: negative except per hpi    OBJECTIVE:    VITAL SIGNS:  Vital Signs Last 24 Hrs  T(C): 38.5 (2020 04:43), Max: 38.5 (2020 04:43)  T(F): 101.3 (2020 04:43), Max: 101.3 (2020 04:43)  HR: 110 (2020 04:43) (110 - 134)  BP: 116/64 (2020 04:43) (116/64 - 126/65)  BP(mean): --  RR: 20 (2020 04:43) (20 - 20)  SpO2: 100% (2020 04:43) (100% - 100%)  Height (cm): 157.48 (20 @ 02:47)  Weight (kg): 86.183 (20 @ 03:04)  BMI (kg/m2): 34.8 (20 @ 03:04)  BSA (m2): 1.87 (20 @ 03:04)  CAPILLARY BLOOD GLUCOSE            PHYSICAL EXAM:  GEN: NAD, A&Ox3  CV: RRR, no m/g/r  LUNGS: CTAB  ABD: soft, NT, ND, +BS  SPECULUM:  PELVIC:  EXT: WWP, no edema/TTP    LABS:                        10.9   14.04 )-----------( 498      ( 2020 03:36 )             34.8   baso 0.0    eos 0.0    imm gran x      lymph 6.0    mono 4.0    poly 87.0       -04    132<L>  |  100  |  7   ----------------------------<  105<H>  4.4   |  19<L>  |  0.48<L>    Ca    9.2      2020 03:36    TPro  7.6  /  Alb  3.5  /  TBili  0.4  /  DBili  x   /  AST  47<H>  /  ALT  29  /  AlkPhos  96  -04          RADIOLOGY: R2 GYN ED CONSULT NOTE    HPI:  33yo  POD#10 s/p rC/S 2/2 NRFHT, presents c/o fever yesterday evening and mid-back pain on the right. Patient has been sweating profusely at night, and found her temperature to be 103 at home at around 6pm. Denies dysuria. Patient reports occasional cough since delivery. Her abdominal pain post-op has improved. Incision healing well, non-draining and without redness. She is only taking motrin prn at this time. Her vaginal bleeding has improved to spotting. Currently breast feeding. No changes in breast skin or breast pain. Denies dizziness, syncope, chest pain, palpitations, shortness of breath, purulent vaginal discharge.     In the ED today the patient presented w/ tachycardia and fever. Tmax 101.3. CXR preliminary read significant for right lower lung hazy opacity. ED provider started treatment for pneumonia.    Primary OB/GYN: Dr. Mcguire    OBH: 19 rC/S, 2017 pC/S NRF  GYNH: +fibroid; denies ov cysts, abnl paps, sti  PMSH: Alpha Thalassemia Trait ( FOB neg); Thyroid nodules, stable  MEDS: motrin  ALL: nkda  SOCH: denies toxic habits    ROS: negative except per hpi    OBJECTIVE:    VITAL SIGNS:  Vital Signs Last 24 Hrs  T(C): 38.5 (2020 04:43), Max: 38.5 (2020 04:43)  T(F): 101.3 (2020 04:43), Max: 101.3 (2020 04:43)  HR: 110 (2020 04:43) (110 - 134)  BP: 116/64 (2020 04:43) (116/64 - 126/65)  BP(mean): --  RR: 20 (2020 04:43) (20 - 20)  SpO2: 100% (2020 04:43) (100% - 100%)  Height (cm): 157.48 (20 @ 02:47)  Weight (kg): 86.183 (20 @ 03:04)  BMI (kg/m2): 34.8 (20 @ 03:04)  BSA (m2): 1.87 (20 @ 03:04)  CAPILLARY BLOOD GLUCOSE      PHYSICAL EXAM:  GEN: NAD, A&Ox3  BREASTS: no erythema, skin changes, tenderness b/l  CV: RRR  LUNGS: decreased breath sounds in the right lower lobe  ABD: soft, appropriately tender, uterine fundus non-tender, incision C/D/I, no erythema, draining, or induration  PELVIC: deferred  EXT: no LE edema/tenderness    LABS:    RVP: neg for flu A/B, RSVP                        10.9   14.04 )-----------( 498      ( 2020 03:36 )             34.8   baso 0.0    eos 0.0    imm gran x      lymph 6.0    mono 4.0    poly 87.0       -04    132<L>  |  100  |  7   ----------------------------<  105<H>  4.4   |  19<L>  |  0.48<L>    Ca    9.2      2020 03:36    TPro  7.6  /  Alb  3.5  /  TBili  0.4  /  DBili  x   /  AST  47<H>  /  ALT  29  /  AlkPhos  96  01-04      RADIOLOGY:    ******PRELIMINARY REPORT******    ******PRELIMINARY REPORT******          EXAM:  XR CHEST AP OR PA 1V                            PROCEDURE DATE:  2020      ******PRELIMINARY REPORT******    ******PRELIMINARY REPORT******            INTERPRETATION:  Right lower lung hazy opacity and bronchograms likely reflect PNA. A lateral view would help characterize.      ******PRELIMINARY REPORT******    ******PRELIMINARY REPORT******          ENOC STEARNS M.D., RADIOLOGY RESIDENT R2 GYN ED CONSULT NOTE    HPI:  33yo  POD#10 s/p rC/S 2/2 NRFHT, presents c/o fever yesterday evening and mid-back pain on the right. Patient has been sweating profusely at night, and found her temperature to be 103 at home at around 6pm. Denies dysuria. Patient reports occasional cough since delivery. No nasal congestion or watery eyes. Her abdominal pain post-op has improved. Incision healing well, non-draining and without redness. She is only taking motrin prn at this time. Her vaginal bleeding has improved to spotting. Currently breast feeding. No changes in breast skin or breast pain. Denies dizziness, syncope, chest pain, palpitations, shortness of breath, purulent vaginal discharge.     In the ED today the patient presented w/ tachycardia and fever. Tmax 101.3. CXR preliminary read significant for right lower lung hazy opacity. ED provider started treatment for pneumonia.    Primary OB/GYN: Dr. Mcguire    OBH: 19 rC/S, 2017 pC/S NRF  GYNH: +fibroid; denies ov cysts, abnl paps, sti  PMSH: Alpha Thalassemia Trait ( FOB neg); Thyroid nodules, stable  MEDS: motrin  ALL: nkda  SOCH: denies toxic habits    ROS: negative except per hpi    OBJECTIVE:    VITAL SIGNS:  Vital Signs Last 24 Hrs  T(C): 38.5 (2020 04:43), Max: 38.5 (2020 04:43)  T(F): 101.3 (2020 04:43), Max: 101.3 (2020 04:43)  HR: 110 (2020 04:43) (110 - 134)  BP: 116/64 (2020 04:43) (116/64 - 126/65)  BP(mean): --  RR: 20 (2020 04:43) (20 - 20)  SpO2: 100% (2020 04:43) (100% - 100%)  Height (cm): 157.48 (20 @ 02:47)  Weight (kg): 86.183 (20 @ 03:04)  BMI (kg/m2): 34.8 (20 @ 03:04)  BSA (m2): 1.87 (20 @ 03:04)  CAPILLARY BLOOD GLUCOSE      PHYSICAL EXAM:  GEN: NAD, A&Ox3  BREASTS: no erythema, skin changes, tenderness b/l  CV: RRR  LUNGS: decreased breath sounds in the right lower lobe  ABD: soft, appropriately tender, uterine fundus non-tender, incision C/D/I, no erythema, draining, or induration  PELVIC: deferred  EXT: no LE edema/tenderness    LABS:    RVP: neg for flu A/B, RSVP                        10.9   14.04 )-----------( 498      ( 2020 03:36 )             34.8   baso 0.0    eos 0.0    imm gran x      lymph 6.0    mono 4.0    poly 87.0       -    132<L>  |  100  |  7   ----------------------------<  105<H>  4.4   |  19<L>  |  0.48<L>    Ca    9.2      2020 03:36    TPro  7.6  /  Alb  3.5  /  TBili  0.4  /  DBili  x   /  AST  47<H>  /  ALT  29  /  AlkPhos  96  -04      RADIOLOGY:    ******PRELIMINARY REPORT******    ******PRELIMINARY REPORT******          EXAM:  XR CHEST AP OR PA 1V                            PROCEDURE DATE:  2020      ******PRELIMINARY REPORT******    ******PRELIMINARY REPORT******            INTERPRETATION:  Right lower lung hazy opacity and bronchograms likely reflect PNA. A lateral view would help characterize.      ******PRELIMINARY REPORT******    ******PRELIMINARY REPORT******          ENOC STEARNS M.D., RADIOLOGY RESIDENT

## 2020-01-04 NOTE — H&P ADULT - ASSESSMENT
33 y/o/f w/ pmh of PreDm, 10 post  presents w/ cough and fevers.    Sepsis 2/2 RLL PNA  10 days post op  Ceftriaxone and Azithro  Tylenol     pod 10  ob following  pain control w/ po dilaudid and Ibuprofen    Predm  monitor sugars PRN    PPX  Lovenox 35 y/o/f w/ pmh of PreDm, 10 post  presents w/ cough and fevers.    Sepsis 2/2 RLL PNA  10 days post op  Ceftriaxone and Azithro, should not be an issue with breast feeding/pumping during hospital course. OK to complete course po outpatient once 24 afebrile   Tylenol     pod 10  ob following  pain control w/ po, Tylenol dilaudid and Ibuprofen    Predm  monitor sugars PRN    Anemia  Thalasemia trait  will monitor    PPX  Lovenox

## 2020-01-05 ENCOUNTER — TRANSCRIPTION ENCOUNTER (OUTPATIENT)
Age: 35
End: 2020-01-05

## 2020-01-05 VITALS
SYSTOLIC BLOOD PRESSURE: 126 MMHG | HEART RATE: 87 BPM | OXYGEN SATURATION: 100 % | DIASTOLIC BLOOD PRESSURE: 77 MMHG | TEMPERATURE: 98 F

## 2020-01-05 LAB
ALBUMIN SERPL ELPH-MCNC: 3.2 G/DL — LOW (ref 3.3–5)
ALP SERPL-CCNC: 91 U/L — SIGNIFICANT CHANGE UP (ref 40–120)
ALT FLD-CCNC: 31 U/L — SIGNIFICANT CHANGE UP (ref 10–45)
ANION GAP SERPL CALC-SCNC: 14 MMOL/L — SIGNIFICANT CHANGE UP (ref 5–17)
APTT BLD: 36.2 SEC — SIGNIFICANT CHANGE UP (ref 27.5–36.3)
AST SERPL-CCNC: 33 U/L — SIGNIFICANT CHANGE UP (ref 10–40)
BILIRUB SERPL-MCNC: 0.2 MG/DL — SIGNIFICANT CHANGE UP (ref 0.2–1.2)
BUN SERPL-MCNC: 9 MG/DL — SIGNIFICANT CHANGE UP (ref 7–23)
CALCIUM SERPL-MCNC: 8.8 MG/DL — SIGNIFICANT CHANGE UP (ref 8.4–10.5)
CHLORIDE SERPL-SCNC: 109 MMOL/L — HIGH (ref 96–108)
CO2 SERPL-SCNC: 20 MMOL/L — LOW (ref 22–31)
CREAT SERPL-MCNC: 0.42 MG/DL — LOW (ref 0.5–1.3)
CULTURE RESULTS: SIGNIFICANT CHANGE UP
GLUCOSE SERPL-MCNC: 87 MG/DL — SIGNIFICANT CHANGE UP (ref 70–99)
HCT VFR BLD CALC: 31.9 % — LOW (ref 34.5–45)
HGB BLD-MCNC: 9.4 G/DL — LOW (ref 11.5–15.5)
INR BLD: 1.14 RATIO — SIGNIFICANT CHANGE UP (ref 0.88–1.16)
MCHC RBC-ENTMCNC: 20.6 PG — LOW (ref 27–34)
MCHC RBC-ENTMCNC: 29.5 GM/DL — LOW (ref 32–36)
MCV RBC AUTO: 69.8 FL — LOW (ref 80–100)
NRBC # BLD: 0 /100 WBCS — SIGNIFICANT CHANGE UP (ref 0–0)
PLATELET # BLD AUTO: 482 K/UL — HIGH (ref 150–400)
POTASSIUM SERPL-MCNC: 4 MMOL/L — SIGNIFICANT CHANGE UP (ref 3.5–5.3)
POTASSIUM SERPL-SCNC: 4 MMOL/L — SIGNIFICANT CHANGE UP (ref 3.5–5.3)
PROT SERPL-MCNC: 6.6 G/DL — SIGNIFICANT CHANGE UP (ref 6–8.3)
PROTHROM AB SERPL-ACNC: 13.2 SEC — HIGH (ref 10–12.9)
RBC # BLD: 4.57 M/UL — SIGNIFICANT CHANGE UP (ref 3.8–5.2)
RBC # FLD: 15.8 % — HIGH (ref 10.3–14.5)
SODIUM SERPL-SCNC: 143 MMOL/L — SIGNIFICANT CHANGE UP (ref 135–145)
SPECIMEN SOURCE: SIGNIFICANT CHANGE UP
WBC # BLD: 5.47 K/UL — SIGNIFICANT CHANGE UP (ref 3.8–10.5)
WBC # FLD AUTO: 5.47 K/UL — SIGNIFICANT CHANGE UP (ref 3.8–10.5)

## 2020-01-05 PROCEDURE — 80053 COMPREHEN METABOLIC PANEL: CPT

## 2020-01-05 PROCEDURE — 85730 THROMBOPLASTIN TIME PARTIAL: CPT

## 2020-01-05 PROCEDURE — 71045 X-RAY EXAM CHEST 1 VIEW: CPT

## 2020-01-05 PROCEDURE — 99285 EMERGENCY DEPT VISIT HI MDM: CPT | Mod: 25

## 2020-01-05 PROCEDURE — 87086 URINE CULTURE/COLONY COUNT: CPT

## 2020-01-05 PROCEDURE — 85610 PROTHROMBIN TIME: CPT

## 2020-01-05 PROCEDURE — 81001 URINALYSIS AUTO W/SCOPE: CPT

## 2020-01-05 PROCEDURE — G0378: CPT

## 2020-01-05 PROCEDURE — 99238 HOSP IP/OBS DSCHRG MGMT 30/<: CPT

## 2020-01-05 PROCEDURE — 87040 BLOOD CULTURE FOR BACTERIA: CPT

## 2020-01-05 PROCEDURE — 87631 RESP VIRUS 3-5 TARGETS: CPT

## 2020-01-05 PROCEDURE — 93005 ELECTROCARDIOGRAM TRACING: CPT

## 2020-01-05 PROCEDURE — 85027 COMPLETE CBC AUTOMATED: CPT

## 2020-01-05 PROCEDURE — 96375 TX/PRO/DX INJ NEW DRUG ADDON: CPT

## 2020-01-05 PROCEDURE — 83605 ASSAY OF LACTIC ACID: CPT

## 2020-01-05 PROCEDURE — 96374 THER/PROPH/DIAG INJ IV PUSH: CPT

## 2020-01-05 RX ORDER — AMOXICILLIN 250 MG/5ML
2 SUSPENSION, RECONSTITUTED, ORAL (ML) ORAL
Qty: 24 | Refills: 0
Start: 2020-01-05 | End: 2020-01-08

## 2020-01-05 RX ADMIN — Medication 600 MILLIGRAM(S): at 09:44

## 2020-01-05 RX ADMIN — Medication 600 MILLIGRAM(S): at 10:14

## 2020-01-05 RX ADMIN — AZITHROMYCIN 250 MILLIGRAM(S): 500 TABLET, FILM COATED ORAL at 03:52

## 2020-01-05 RX ADMIN — ENOXAPARIN SODIUM 40 MILLIGRAM(S): 100 INJECTION SUBCUTANEOUS at 11:48

## 2020-01-05 RX ADMIN — CEFTRIAXONE 100 MILLIGRAM(S): 500 INJECTION, POWDER, FOR SOLUTION INTRAMUSCULAR; INTRAVENOUS at 03:04

## 2020-01-05 NOTE — DISCHARGE NOTE PROVIDER - NSDCFUSCHEDAPPT_GEN_ALL_CORE_FT
RAMSES REYNOLDS ; 01/06/2020 ; [facility]AMS[facility] ; [facility]06/2020 ; [facility] ; [facility] ; [facility] ; [facility]  RAMSES REYNOLDS ; 03/11/2020 ; [facility] [address]

## 2020-01-05 NOTE — PROGRESS NOTE ADULT - PROBLEM SELECTOR PLAN 1
Neuro: po pain meds  CV: Hemodynamically stable  Pulm: Saturating well on room air, encourage incentive spirometry, agree with azithromycin and ceftriaxone for pneumonia, f/u blood cultures  GI: regular diet  : voiding spontaneously  Heme: c/w lovenox and SCDs for DVT ppx  Dispo: care per primary team    Esau Abbasi MD PGY3

## 2020-01-05 NOTE — PROGRESS NOTE ADULT - SUBJECTIVE AND OBJECTIVE BOX
R3 note  HD#    INTERVAL HPI/OVERNIGHT EVENTS: Pt seen and examined at bedside.  Pt complains of ___________________________.  Ambulating, passing flatus, tolerating regular diet, pain controlled with analgesia, urinating spontaneously  She denies nausea/vomiting/fever/chills/chest pain/SOB/dizziness.    MEDICATIONS  (STANDING):  azithromycin  IVPB 500 milliGRAM(s) IV Intermittent every 24 hours  azithromycin  IVPB      cefTRIAXone   IVPB 1000 milliGRAM(s) IV Intermittent every 24 hours  enoxaparin Injectable 40 milliGRAM(s) SubCutaneous daily  sodium chloride 0.9%. 1000 milliLiter(s) (100 mL/Hr) IV Continuous <Continuous>    MEDICATIONS  (PRN):  acetaminophen   Tablet .. 650 milliGRAM(s) Oral every 6 hours PRN Temp greater or equal to 38C (100.4F), Mild Pain (1 - 3)  HYDROmorphone   Tablet 2 milliGRAM(s) Oral every 4 hours PRN Severe Pain (7 - 10)  ibuprofen  Tablet. 600 milliGRAM(s) Oral every 6 hours PRN Moderate Pain (4 - 6)      12 point ROS negative except as outlined above    Vital Signs Last 24 Hrs  T(C): 36.7 (2020 01:31), Max: 38.5 (2020 04:43)  T(F): 98.1 (:31), Max: 101.3 (2020 04:43)  HR: 86 (:) (86 - 117)  BP: 132/89 (:) (116/64 - 138/90)  BP(mean): --  RR: 18 (2020 01:31) (18 - 21)  SpO2: 99% (:31) (99% - 100%)    I&O's Summary    2020 07:01  -  2020 03:51  --------------------------------------------------------  IN: 720 mL / OUT: 0 mL / NET: 720 mL          PHYSICAL EXAM:    GA: NAD, A+0 x 3  CV: RRR  Pulm: CTA BL  Abd: ( + ) BS, soft, nontender, nondistended, no rebound or guarding,   Incision: clean, dry and intact; steri-strips in place  Uterus: Fundus midline; firm  : lochia WNL  Extremities: no swelling or calf tenderness  Lines:      LABS:                          10.9   14.04 )-----------( 498      ( 2020 03:36 )             34.8   baso 0.0    eos 0.0    imm gran x      lymph 6.0    mono 4.0    poly 87.0         PT/INR - ( 2020 03:41 )   PT: 16.8 sec;   INR: 1.46 ratio         PTT - ( 2020 03:41 )  PTT:35.7 sec  Urinalysis Basic - ( 2020 05:21 )    Color: Colorless / Appearance: Clear / S.008 / pH: x  Gluc: x / Ketone: Negative  / Bili: Negative / Urobili: Negative   Blood: x / Protein: Trace / Nitrite: Negative   Leuk Esterase: Large / RBC: 14 /hpf / WBC 43 /HPF   Sq Epi: x / Non Sq Epi: 4 /hpf / Bacteria: Negative R3 note  HD#2 POD#11    INTERVAL HPI/OVERNIGHT EVENTS: Pt seen and examined at bedside.  Pt complains of ___________________________.  Ambulating, passing flatus, tolerating regular diet, pain controlled with analgesia, urinating spontaneously  She denies nausea/vomiting/fever/chills/chest pain/dizziness.    MEDICATIONS  (STANDING):  azithromycin  IVPB 500 milliGRAM(s) IV Intermittent every 24 hours  azithromycin  IVPB      cefTRIAXone   IVPB 1000 milliGRAM(s) IV Intermittent every 24 hours  enoxaparin Injectable 40 milliGRAM(s) SubCutaneous daily  sodium chloride 0.9%. 1000 milliLiter(s) (100 mL/Hr) IV Continuous <Continuous>    MEDICATIONS  (PRN):  acetaminophen   Tablet .. 650 milliGRAM(s) Oral every 6 hours PRN Temp greater or equal to 38C (100.4F), Mild Pain (1 - 3)  HYDROmorphone   Tablet 2 milliGRAM(s) Oral every 4 hours PRN Severe Pain (7 - 10)  ibuprofen  Tablet. 600 milliGRAM(s) Oral every 6 hours PRN Moderate Pain (4 - 6)      12 point ROS negative except as outlined above    Vital Signs Last 24 Hrs  T(C): 36.7 (2020 01:31), Max: 38.5 (2020 04:43)  T(F): 98.1 (:31), Max: 101.3 (2020 04:43)  HR: 86 (:) (86 - 117)  BP: 132/89 (:) (116/64 - 138/90)  BP(mean): --  RR: 18 (2020 01:31) (18 - 21)  SpO2: 99% (2020 01:31) (99% - 100%)    I&O's Summary    2020 07:01  -  2020 03:51  --------------------------------------------------------  IN: 720 mL / OUT: 0 mL / NET: 720 mL          PHYSICAL EXAM:    GA: NAD, A+0 x 3  CV: RRR  Pulm: CTA BL  Abd: soft, nontender, nondistended, no rebound or guarding,   Incision: clean, dry and intact; steri-strips in place  Uterus: Fundus midline; firm  : lochia WNL  Extremities: no swelling or calf tenderness  Lines:      LABS:                          10.9   14.04 )-----------( 498      ( 2020 03:36 )             34.8   baso 0.0    eos 0.0    imm gran x      lymph 6.0    mono 4.0    poly 87.0         PT/INR - ( 2020 03:41 )   PT: 16.8 sec;   INR: 1.46 ratio         PTT - ( 2020 03:41 )  PTT:35.7 sec  Urinalysis Basic - ( 2020 05:21 )    Color: Colorless / Appearance: Clear / S.008 / pH: x  Gluc: x / Ketone: Negative  / Bili: Negative / Urobili: Negative   Blood: x / Protein: Trace / Nitrite: Negative   Leuk Esterase: Large / RBC: 14 /hpf / WBC 43 /HPF   Sq Epi: x / Non Sq Epi: 4 /hpf / Bacteria: Negative R3 note  HD#2 POD#11    INTERVAL HPI/OVERNIGHT EVENTS: Pt seen and examined at bedside.  Pt notes improvement in cough and shortness of breath.  Ambulating, passing flatus, tolerating regular diet, pain controlled with analgesia, urinating spontaneously  She denies nausea/vomiting/fever/chills/chest pain/dizziness.    MEDICATIONS  (STANDING):  azithromycin  IVPB 500 milliGRAM(s) IV Intermittent every 24 hours  azithromycin  IVPB      cefTRIAXone   IVPB 1000 milliGRAM(s) IV Intermittent every 24 hours  enoxaparin Injectable 40 milliGRAM(s) SubCutaneous daily  sodium chloride 0.9%. 1000 milliLiter(s) (100 mL/Hr) IV Continuous <Continuous>    MEDICATIONS  (PRN):  acetaminophen   Tablet .. 650 milliGRAM(s) Oral every 6 hours PRN Temp greater or equal to 38C (100.4F), Mild Pain (1 - 3)  HYDROmorphone   Tablet 2 milliGRAM(s) Oral every 4 hours PRN Severe Pain (7 - 10)  ibuprofen  Tablet. 600 milliGRAM(s) Oral every 6 hours PRN Moderate Pain (4 - 6)      12 point ROS negative except as outlined above    Vital Signs Last 24 Hrs  T(C): 36.7 (2020 01:31), Max: 38.5 (2020 04:43)  T(F): 98.1 (:31), Max: 101.3 (2020 04:43)  HR: 86 (2020 01:31) (86 - 117)  BP: 132/89 (2020 01:) (116/64 - 138/90)  BP(mean): --  RR: 18 (2020 01:31) (18 - 21)  SpO2: 99% (2020 01:31) (99% - 100%)    I&O's Summary    2020 07:01  -  2020 03:51  --------------------------------------------------------  IN: 720 mL / OUT: 0 mL / NET: 720 mL          PHYSICAL EXAM:    GA: NAD, A+0 x 3  CV: RRR  Pulm: CTA BL  Abd: soft, nontender, nondistended, no rebound or guarding,   Incision: clean, dry and intact; steri-strips in place  Uterus: Fundus midline; firm  : lochia WNL  Extremities: no swelling or calf tenderness  Lines:      LABS:                          10.9   14.04 )-----------( 498      ( 2020 03:36 )             34.8   baso 0.0    eos 0.0    imm gran x      lymph 6.0    mono 4.0    poly 87.0         PT/INR - ( 2020 03:41 )   PT: 16.8 sec;   INR: 1.46 ratio         PTT - ( 2020 03:41 )  PTT:35.7 sec  Urinalysis Basic - ( 2020 05:21 )    Color: Colorless / Appearance: Clear / S.008 / pH: x  Gluc: x / Ketone: Negative  / Bili: Negative / Urobili: Negative   Blood: x / Protein: Trace / Nitrite: Negative   Leuk Esterase: Large / RBC: 14 /hpf / WBC 43 /HPF   Sq Epi: x / Non Sq Epi: 4 /hpf / Bacteria: Negative

## 2020-01-05 NOTE — DISCHARGE NOTE PROVIDER - HOSPITAL COURSE
35 y/o/f w/ pmh of Pre-diabetic, and anemia (Thalassemia trait)10 days post  presents w/ cough and fevers. pt meeting sepsis criteria 2/2 RLL PNA.     Ceftriaxone and Azithromycin, OK to complete course po outpatient as pt afebrile x 24 hours. OB following, pain control w/ po, Tylenol, Dilaudid and Ibuprofen.    Pt cleared for DC on PO Amoxicillin, d/w Dr. Taylor pharmacist.

## 2020-01-05 NOTE — DISCHARGE NOTE PROVIDER - NSDCMRMEDTOKEN_GEN_ALL_CORE_FT
acetaminophen 500 mg oral tablet: 2 tab(s) orally every 6 hours  amoxicillin 500 mg oral tablet: 2 tab(s) orally every 8 hours   ibuprofen 200 mg oral tablet: 3 tab(s) orally every 4 hours  prenatal PNV-DHA: 1 tab(s) orally once a day  vitamin c with iron: 1 tab(s) orally once a day

## 2020-01-05 NOTE — DISCHARGE NOTE NURSING/CASE MANAGEMENT/SOCIAL WORK - PATIENT PORTAL LINK FT
You can access the FollowMyHealth Patient Portal offered by [facility] by registering at the following website: [email or web address]. By joining [employer]’s FollowMyHealth portal, you will also be able to view your health information using other applications (apps) compatible with our system.

## 2020-01-05 NOTE — PROGRESS NOTE ADULT - ASSESSMENT
33yo  POD#11 s/p rC/S, presents with fever and tachycardia. No SOB/CP. O2 sat 100% on room air. RVP negative. CXR with significant for opacity concerning for pneumonia.

## 2020-01-05 NOTE — DISCHARGE NOTE PROVIDER - NSDCCPCAREPLAN_GEN_ALL_CORE_FT
PRINCIPAL DISCHARGE DIAGNOSIS  Diagnosis: Pneumonia  Assessment and Plan of Treatment: Continue antibiotics as prescribed  Follow up with primary care physician   Pneumonia is a lung infection that can cause a fever, cough, and trouble breathing.  Continue all antibiotics as ordered until complete.  Nutrition is important, eat small frequent meals.  Get lots of rest and drink fluids.  Call your health care provider upon arrival home from hospital and make a follow up appointment for one week.  If your cough worsens, you develop fever greater than 101', you have shaking chills, a fast heartbeat, trouble breathing and/or feel your are breathing much faster than usual, call your healthcare provider.  Make sure you wash your hands frequently.

## 2020-01-07 ENCOUNTER — APPOINTMENT (OUTPATIENT)
Dept: OBGYN | Facility: CLINIC | Age: 35
End: 2020-01-07

## 2020-01-08 ENCOUNTER — APPOINTMENT (OUTPATIENT)
Dept: OBGYN | Facility: CLINIC | Age: 35
End: 2020-01-08
Payer: COMMERCIAL

## 2020-01-08 VITALS
WEIGHT: 174 LBS | BODY MASS INDEX: 32.85 KG/M2 | DIASTOLIC BLOOD PRESSURE: 70 MMHG | SYSTOLIC BLOOD PRESSURE: 130 MMHG | HEIGHT: 61 IN

## 2020-01-08 PROCEDURE — 0502F SUBSEQUENT PRENATAL CARE: CPT

## 2020-01-08 PROCEDURE — 99080 SPECIAL REPORTS OR FORMS: CPT

## 2020-01-08 NOTE — HISTORY OF PRESENT ILLNESS
[Postpartum Follow Up] : postpartum follow up [Complications:___] : complications include: [unfilled] [Repeat C/S] : delivered by  section (repeat) [Male] : Delivery History: baby boy [Breastfeeding] : currently nursing [BF with Difficulty] : nursing without difficulty [Resumed Menses] : has not resumed her menses [Resumed [place]] : has not resumed intercourse [S/Sx PP Depression] : no signs/symptoms of postpartum depression [Incisional Pain] : no incisional pain [Suicidal Ideation] : no suicidal ideation [Chills] : no chills [Vaginal Discharge] : no vaginal discharge [Dysuria] : no dysuria [Headache] : no headache [Fever] : fever [Clean/Dry/Intact] : clean, dry and intact [Swelling] : not swollen [Healed] : healed [de-identified] : recent admission for PNA, s/p abx [de-identified] : minimal maceration around incision--at tape site

## 2020-01-09 LAB
CULTURE RESULTS: SIGNIFICANT CHANGE UP
CULTURE RESULTS: SIGNIFICANT CHANGE UP
SPECIMEN SOURCE: SIGNIFICANT CHANGE UP
SPECIMEN SOURCE: SIGNIFICANT CHANGE UP

## 2020-02-18 ENCOUNTER — APPOINTMENT (OUTPATIENT)
Dept: OBGYN | Facility: CLINIC | Age: 35
End: 2020-02-18
Payer: COMMERCIAL

## 2020-02-18 VITALS — DIASTOLIC BLOOD PRESSURE: 80 MMHG | WEIGHT: 174 LBS | SYSTOLIC BLOOD PRESSURE: 130 MMHG | BODY MASS INDEX: 32.88 KG/M2

## 2020-02-18 PROCEDURE — 0503F POSTPARTUM CARE VISIT: CPT

## 2020-02-20 NOTE — HISTORY OF PRESENT ILLNESS
[Postpartum Follow Up] : postpartum follow up [Complications:___] : complications include: [unfilled] [Repeat C/S] : delivered by  section (repeat) [Male] : Delivery History: baby boy [Breastfeeding] : currently nursing [Fever] : fever [Intended Contraception] : Intended Contraception: [Consistent Condom Use] : consistent condom use [Clean/Dry/Intact] : clean, dry and intact [Healed] : healed [Back to Normal] : is back to normal in size [Normal] : the vagina was normal [Examination Of The Breasts] : breasts are normal [Delivery Date: ___] : on [unfilled] [BF with Difficulty] : nursing without difficulty [Resumed Menses] : has not resumed her menses [Resumed [place]] : has not resumed intercourse [Vaginal Discharge] : no vaginal discharge [Chills] : no chills [Swelling] : not swollen [FreeTextEntry9] : transaminits, suspect preecalmsia and treated w magnesium [de-identified] : recent admission for PNA, s/p abx [de-identified] : minimal maceration around incision--at tape site [S/Sx PP Depression] : no signs/symptoms of postpartum depression [Incisional Pain] : no incisional pain [Suicidal Ideation] : no suicidal ideation [Dysuria] : no dysuria [Headache] : no headache

## 2020-03-11 ENCOUNTER — APPOINTMENT (OUTPATIENT)
Dept: ENDOCRINOLOGY | Facility: CLINIC | Age: 35
End: 2020-03-11

## 2021-05-21 ENCOUNTER — TRANSCRIPTION ENCOUNTER (OUTPATIENT)
Age: 36
End: 2021-05-21

## 2021-05-25 ENCOUNTER — APPOINTMENT (OUTPATIENT)
Dept: ORTHOPEDIC SURGERY | Facility: CLINIC | Age: 36
End: 2021-05-25

## 2021-05-27 ENCOUNTER — TRANSCRIPTION ENCOUNTER (OUTPATIENT)
Age: 36
End: 2021-05-27

## 2021-07-23 ENCOUNTER — INPATIENT (INPATIENT)
Facility: HOSPITAL | Age: 36
LOS: 0 days | Discharge: HOME | End: 2021-07-24
Attending: SURGERY | Admitting: SURGERY
Payer: COMMERCIAL

## 2021-07-23 ENCOUNTER — TRANSCRIPTION ENCOUNTER (OUTPATIENT)
Age: 36
End: 2021-07-23

## 2021-07-23 VITALS
HEIGHT: 62 IN | OXYGEN SATURATION: 99 % | RESPIRATION RATE: 16 BRPM | SYSTOLIC BLOOD PRESSURE: 138 MMHG | DIASTOLIC BLOOD PRESSURE: 74 MMHG | HEART RATE: 84 BPM | TEMPERATURE: 99 F

## 2021-07-23 DIAGNOSIS — Z98.891 HISTORY OF UTERINE SCAR FROM PREVIOUS SURGERY: Chronic | ICD-10-CM

## 2021-07-23 DIAGNOSIS — K08.499 PARTIAL LOSS OF TEETH DUE TO OTHER SPECIFIED CAUSE, UNSPECIFIED CLASS: Chronic | ICD-10-CM

## 2021-07-23 DIAGNOSIS — K81.0 ACUTE CHOLECYSTITIS: ICD-10-CM

## 2021-07-23 LAB
ALBUMIN SERPL ELPH-MCNC: 4.7 G/DL — SIGNIFICANT CHANGE UP (ref 3.3–5)
ALP SERPL-CCNC: 80 U/L — SIGNIFICANT CHANGE UP (ref 40–120)
ALT FLD-CCNC: 15 U/L — SIGNIFICANT CHANGE UP (ref 4–33)
ANION GAP SERPL CALC-SCNC: 14 MMOL/L — SIGNIFICANT CHANGE UP (ref 7–14)
APTT BLD: 35.2 SEC — SIGNIFICANT CHANGE UP (ref 27–36.3)
AST SERPL-CCNC: 22 U/L — SIGNIFICANT CHANGE UP (ref 4–32)
BASOPHILS # BLD AUTO: 0 K/UL — SIGNIFICANT CHANGE UP (ref 0–0.2)
BASOPHILS NFR BLD AUTO: 0 % — SIGNIFICANT CHANGE UP (ref 0–2)
BILIRUB SERPL-MCNC: 0.5 MG/DL — SIGNIFICANT CHANGE UP (ref 0.2–1.2)
BLD GP AB SCN SERPL QL: NEGATIVE — SIGNIFICANT CHANGE UP
BLOOD GAS VENOUS COMPREHENSIVE RESULT: SIGNIFICANT CHANGE UP
BUN SERPL-MCNC: 8 MG/DL — SIGNIFICANT CHANGE UP (ref 7–23)
CALCIUM SERPL-MCNC: 9.4 MG/DL — SIGNIFICANT CHANGE UP (ref 8.4–10.5)
CHLORIDE SERPL-SCNC: 105 MMOL/L — SIGNIFICANT CHANGE UP (ref 98–107)
CO2 SERPL-SCNC: 20 MMOL/L — LOW (ref 22–31)
CREAT SERPL-MCNC: 0.55 MG/DL — SIGNIFICANT CHANGE UP (ref 0.5–1.3)
EOSINOPHIL # BLD AUTO: 0 K/UL — SIGNIFICANT CHANGE UP (ref 0–0.5)
EOSINOPHIL NFR BLD AUTO: 0 % — SIGNIFICANT CHANGE UP (ref 0–6)
GLUCOSE SERPL-MCNC: 116 MG/DL — HIGH (ref 70–99)
HCG SERPL-ACNC: <5 MIU/ML — SIGNIFICANT CHANGE UP
HCT VFR BLD CALC: 30.8 % — LOW (ref 34.5–45)
HGB BLD-MCNC: 8.7 G/DL — LOW (ref 11.5–15.5)
IANC: 15.88 K/UL — HIGH (ref 1.5–8.5)
INR BLD: 1.22 RATIO — HIGH (ref 0.88–1.16)
LYMPHOCYTES # BLD AUTO: 1.58 K/UL — SIGNIFICANT CHANGE UP (ref 1–3.3)
LYMPHOCYTES # BLD AUTO: 8.6 % — LOW (ref 13–44)
MCHC RBC-ENTMCNC: 17.2 PG — LOW (ref 27–34)
MCHC RBC-ENTMCNC: 28.2 GM/DL — LOW (ref 32–36)
MCV RBC AUTO: 60.9 FL — LOW (ref 80–100)
MONOCYTES # BLD AUTO: 0.48 K/UL — SIGNIFICANT CHANGE UP (ref 0–0.9)
MONOCYTES NFR BLD AUTO: 2.6 % — SIGNIFICANT CHANGE UP (ref 2–14)
NEUTROPHILS # BLD AUTO: 16.3 K/UL — HIGH (ref 1.8–7.4)
NEUTROPHILS NFR BLD AUTO: 88.8 % — HIGH (ref 43–77)
PLATELET # BLD AUTO: 670 K/UL — HIGH (ref 150–400)
POTASSIUM SERPL-MCNC: 3.7 MMOL/L — SIGNIFICANT CHANGE UP (ref 3.5–5.3)
POTASSIUM SERPL-SCNC: 3.7 MMOL/L — SIGNIFICANT CHANGE UP (ref 3.5–5.3)
PROT SERPL-MCNC: 8.4 G/DL — HIGH (ref 6–8.3)
PROTHROM AB SERPL-ACNC: 13.8 SEC — HIGH (ref 10.6–13.6)
RBC # BLD: 5.06 M/UL — SIGNIFICANT CHANGE UP (ref 3.8–5.2)
RBC # FLD: 20.6 % — HIGH (ref 10.3–14.5)
RH IG SCN BLD-IMP: POSITIVE — SIGNIFICANT CHANGE UP
SARS-COV-2 RNA SPEC QL NAA+PROBE: SIGNIFICANT CHANGE UP
SODIUM SERPL-SCNC: 139 MMOL/L — SIGNIFICANT CHANGE UP (ref 135–145)
WBC # BLD: 18.36 K/UL — HIGH (ref 3.8–10.5)
WBC # FLD AUTO: 18.36 K/UL — HIGH (ref 3.8–10.5)

## 2021-07-23 PROCEDURE — 76705 ECHO EXAM OF ABDOMEN: CPT | Mod: 26

## 2021-07-23 PROCEDURE — 99221 1ST HOSP IP/OBS SF/LOW 40: CPT | Mod: 57,GC

## 2021-07-23 PROCEDURE — 99285 EMERGENCY DEPT VISIT HI MDM: CPT

## 2021-07-23 RX ORDER — SODIUM CHLORIDE 9 MG/ML
1000 INJECTION INTRAMUSCULAR; INTRAVENOUS; SUBCUTANEOUS ONCE
Refills: 0 | Status: COMPLETED | OUTPATIENT
Start: 2021-07-23 | End: 2021-07-23

## 2021-07-23 RX ORDER — MORPHINE SULFATE 50 MG/1
4 CAPSULE, EXTENDED RELEASE ORAL ONCE
Refills: 0 | Status: DISCONTINUED | OUTPATIENT
Start: 2021-07-23 | End: 2021-07-23

## 2021-07-23 RX ORDER — HYDROMORPHONE HYDROCHLORIDE 2 MG/ML
0.5 INJECTION INTRAMUSCULAR; INTRAVENOUS; SUBCUTANEOUS ONCE
Refills: 0 | Status: DISCONTINUED | OUTPATIENT
Start: 2021-07-23 | End: 2021-07-23

## 2021-07-23 RX ORDER — ENOXAPARIN SODIUM 100 MG/ML
40 INJECTION SUBCUTANEOUS DAILY
Refills: 0 | Status: DISCONTINUED | OUTPATIENT
Start: 2021-07-23 | End: 2021-07-24

## 2021-07-23 RX ORDER — PIPERACILLIN AND TAZOBACTAM 4; .5 G/20ML; G/20ML
3.38 INJECTION, POWDER, LYOPHILIZED, FOR SOLUTION INTRAVENOUS ONCE
Refills: 0 | Status: COMPLETED | OUTPATIENT
Start: 2021-07-23 | End: 2021-07-23

## 2021-07-23 RX ORDER — ACETAMINOPHEN 500 MG
1000 TABLET ORAL EVERY 6 HOURS
Refills: 0 | Status: DISCONTINUED | OUTPATIENT
Start: 2021-07-23 | End: 2021-07-24

## 2021-07-23 RX ORDER — SODIUM CHLORIDE 9 MG/ML
1000 INJECTION, SOLUTION INTRAVENOUS
Refills: 0 | Status: DISCONTINUED | OUTPATIENT
Start: 2021-07-23 | End: 2021-07-24

## 2021-07-23 RX ORDER — PIPERACILLIN AND TAZOBACTAM 4; .5 G/20ML; G/20ML
3.38 INJECTION, POWDER, LYOPHILIZED, FOR SOLUTION INTRAVENOUS EVERY 8 HOURS
Refills: 0 | Status: DISCONTINUED | OUTPATIENT
Start: 2021-07-23 | End: 2021-07-24

## 2021-07-23 RX ORDER — ONDANSETRON 8 MG/1
4 TABLET, FILM COATED ORAL ONCE
Refills: 0 | Status: COMPLETED | OUTPATIENT
Start: 2021-07-23 | End: 2021-07-23

## 2021-07-23 RX ADMIN — SODIUM CHLORIDE 1000 MILLILITER(S): 9 INJECTION INTRAMUSCULAR; INTRAVENOUS; SUBCUTANEOUS at 17:49

## 2021-07-23 RX ADMIN — HYDROMORPHONE HYDROCHLORIDE 0.5 MILLIGRAM(S): 2 INJECTION INTRAMUSCULAR; INTRAVENOUS; SUBCUTANEOUS at 22:33

## 2021-07-23 RX ADMIN — MORPHINE SULFATE 4 MILLIGRAM(S): 50 CAPSULE, EXTENDED RELEASE ORAL at 19:03

## 2021-07-23 RX ADMIN — MORPHINE SULFATE 4 MILLIGRAM(S): 50 CAPSULE, EXTENDED RELEASE ORAL at 17:48

## 2021-07-23 RX ADMIN — SODIUM CHLORIDE 1000 MILLILITER(S): 9 INJECTION INTRAMUSCULAR; INTRAVENOUS; SUBCUTANEOUS at 14:51

## 2021-07-23 RX ADMIN — HYDROMORPHONE HYDROCHLORIDE 0.5 MILLIGRAM(S): 2 INJECTION INTRAMUSCULAR; INTRAVENOUS; SUBCUTANEOUS at 22:48

## 2021-07-23 RX ADMIN — ONDANSETRON 4 MILLIGRAM(S): 8 TABLET, FILM COATED ORAL at 16:05

## 2021-07-23 RX ADMIN — MORPHINE SULFATE 4 MILLIGRAM(S): 50 CAPSULE, EXTENDED RELEASE ORAL at 17:49

## 2021-07-23 RX ADMIN — PIPERACILLIN AND TAZOBACTAM 3.38 GRAM(S): 4; .5 INJECTION, POWDER, LYOPHILIZED, FOR SOLUTION INTRAVENOUS at 17:49

## 2021-07-23 RX ADMIN — PIPERACILLIN AND TAZOBACTAM 200 GRAM(S): 4; .5 INJECTION, POWDER, LYOPHILIZED, FOR SOLUTION INTRAVENOUS at 16:05

## 2021-07-23 RX ADMIN — MORPHINE SULFATE 4 MILLIGRAM(S): 50 CAPSULE, EXTENDED RELEASE ORAL at 14:51

## 2021-07-23 RX ADMIN — MORPHINE SULFATE 4 MILLIGRAM(S): 50 CAPSULE, EXTENDED RELEASE ORAL at 19:34

## 2021-07-23 RX ADMIN — MORPHINE SULFATE 4 MILLIGRAM(S): 50 CAPSULE, EXTENDED RELEASE ORAL at 16:05

## 2021-07-23 NOTE — ED ADULT NURSE REASSESSMENT NOTE - NS ED NURSE REASSESS COMMENT FT1
received report from sasha fregoso. patient A&OX4, ambulatory. Report given, patient awaiting transport. vital signs as noted. patient in no acute distress, respirations even and unlabored. will continue to monitor.

## 2021-07-23 NOTE — H&P ADULT - ASSESSMENT
Assessment:  35y Female no PMH, PSH of C section x2, presented with 1 day of RUQ pain with nausea and vomiting. RUQ tender on exam, positive Og. No fever. WCC 18. RUQ US showing multiple GS with impacted stone in neck. Overall impression consistent with acute cholecystitis.    Plan:  - admit to surgery under Dr Galan  - NPO, IVF  - IV zosyn  - OR tomorrow for laparoscopic cholecystectomy  - consented  - will book for tomorrow    dw Dr Galan

## 2021-07-23 NOTE — H&P ADULT - NSHPLABSRESULTS_GEN_ALL_CORE
Labs:  CAPILLARY BLOOD GLUCOSE                        8.7    18.36 )-----------( 670      ( 23 Jul 2021 15:08 )             30.8       Auto Neutrophil %: 88.8 % (07-23-21 @ 15:08)    07-23    139  |  105  |  8   ----------------------------<  116<H>  3.7   |  20<L>  |  0.55      Calcium, Total Serum: 9.4 mg/dL (07-23-21 @ 15:08)      LFTs:             8.4  | 0.5  | 22       ------------------[80      ( 23 Jul 2021 15:08 )  4.7  | x    | 15          Lipase:x      Amylase:x         Blood Gas Venous - Lactate: 2.0 mmol/L (07-23-21 @ 15:08)      Coags:     13.8   ----< 1.22    ( 23 Jul 2021 15:12 )     35.2    Radiology:  < from: US Abdomen Limited (ED) (07.23.21 @ 14:33) >    IMPRESSION:Multiple gallstones including immobile apparently impacted stone in GB neck, distended GB,  +sonographic aguilar's sign. Suspicious for cholecystitis.    < end of copied text >

## 2021-07-23 NOTE — ED PROVIDER NOTE - CLINICAL SUMMARY MEDICAL DECISION MAKING FREE TEXT BOX
Pt p/w sudden onset RUQ abd pain and vomiting, persistent and severe.  Uncomfortable appearing. +Muprhy's and RUQ tenderness.  POCUS revealing multiple gallstones, distended GB, impacted stone at neck.  Suspect cholecystitis.  Has no lower abd pain to indicate ovarian torsion, but will obtain pregnancy test to r/o ectopic pregnancy . Surgery eval, labs, pain control, IVF, and reassess

## 2021-07-23 NOTE — ED ADULT NURSE NOTE - OBJECTIVE STATEMENT
Receive pt. in ER room 12 alert and oriented x 4, presenting to the ER with complaints of abdominal pain. Pt. stated " I have terrible pain in my abdomen it woke me up around 2am ". Medicated as ordered, labs sent. Call bell place within reach.

## 2021-07-23 NOTE — ED ADULT NURSE NOTE - PRIMARY CARE PROVIDER
unknown PAST SURGICAL HISTORY:  H/O cataract extraction bilateral eye    H/O foot surgery left foot bunionectomy    History of      S/P left colectomy

## 2021-07-23 NOTE — H&P ADULT - HISTORY OF PRESENT ILLNESS
35F no PMH, PSH of C section x2, presents with 1 day hx of acute onset RUQ abdominal pain, constant since 2am, associated with nausea and vomiting. Patient was seen in ED, persistent pain despite morphine. Workup in ED showing leukocytosis to 18, RUQ with cholelithiasis possible cholecystitis.

## 2021-07-23 NOTE — ED PROVIDER NOTE - OBJECTIVE STATEMENT
35 yr olf F c non contrib PMHx p/w severe acute abdominal pain and vomiting which woke her from sleep early this morning. Has not been able to eat today, vomiting and pain persistent.  No prior.  Has been well otherwise. Is on no meds.  Got morphine by EMS today. LMP approx 1 mo ago. NO trauma. NO HA. No falls. No urinary complaints. NO specific exacerbating or alleviating factors. Pain is most at RUQ.

## 2021-07-23 NOTE — ED ADULT NURSE REASSESSMENT NOTE - NS ED NURSE REASSESS COMMENT FT1
Pt awake, alert and oriented x 4 c/o abdominal pain with morphine given.   MD Michael at bedside to assess pt and update her on results.    IV site unremarkable.    Awaiting inpatient bed.

## 2021-07-23 NOTE — H&P ADULT - NSHPPHYSICALEXAM_GEN_ALL_CORE
T(C): 37 (07-23-21 @ 13:39), Max: 37 (07-23-21 @ 13:39)  HR: 84 (07-23-21 @ 13:39) (84 - 84)  BP: 138/74 (07-23-21 @ 13:39) (138/74 - 138/74)  RR: 16 (07-23-21 @ 13:39) (16 - 16)  SpO2: 99% (07-23-21 @ 13:39) (99% - 99%)    Physical Exam:  General: NAD, alert  HEENT: normocephalic, PERRLA  CVS: RRR, no murmur  Chest: CTABL  Abdomen: RUQ tenderness, aguilar positive, non distended, no guarding or rigidity  Extremities: peripheral pulses palpable

## 2021-07-23 NOTE — ED ADULT NURSE NOTE - NS ED NOTE ABUSE SUSPICION NEGLECT YN
Will continue to follow for GOC, support, resources. The patient re-stated that his goal if for going to [facility] and then home with home care. He indicated that he worked for several years and that he has two pensions that will support him so he can have a HHA to get the nursing care he needs. He clearly stated he does not wan to go to a nursing home. If qualifying, after [place], house calls may be a good asset for this patient. No

## 2021-07-23 NOTE — ED ADULT TRIAGE NOTE - CHIEF COMPLAINT QUOTE
A&OX3 c/o upper abdominal pain with nausea and vomiting that began this morning, arrived with 20 Left AC IV fluids infusing, pt was given 4mg of Morphine denies fever chills

## 2021-07-24 ENCOUNTER — RESULT REVIEW (OUTPATIENT)
Age: 36
End: 2021-07-24

## 2021-07-24 VITALS
SYSTOLIC BLOOD PRESSURE: 124 MMHG | HEART RATE: 90 BPM | RESPIRATION RATE: 17 BRPM | DIASTOLIC BLOOD PRESSURE: 65 MMHG | OXYGEN SATURATION: 100 %

## 2021-07-24 LAB
ALBUMIN SERPL ELPH-MCNC: 4.3 G/DL — SIGNIFICANT CHANGE UP (ref 3.3–5)
ALP SERPL-CCNC: 79 U/L — SIGNIFICANT CHANGE UP (ref 40–120)
ALT FLD-CCNC: 12 U/L — SIGNIFICANT CHANGE UP (ref 4–33)
ANION GAP SERPL CALC-SCNC: 14 MMOL/L — SIGNIFICANT CHANGE UP (ref 7–14)
AST SERPL-CCNC: 15 U/L — SIGNIFICANT CHANGE UP (ref 4–32)
BASOPHILS # BLD AUTO: 0.05 K/UL — SIGNIFICANT CHANGE UP (ref 0–0.2)
BASOPHILS NFR BLD AUTO: 0.3 % — SIGNIFICANT CHANGE UP (ref 0–2)
BILIRUB DIRECT SERPL-MCNC: <0.2 MG/DL — SIGNIFICANT CHANGE UP (ref 0–0.2)
BILIRUB INDIRECT FLD-MCNC: >0.6 MG/DL — SIGNIFICANT CHANGE UP (ref 0–1)
BILIRUB SERPL-MCNC: 0.8 MG/DL — SIGNIFICANT CHANGE UP (ref 0.2–1.2)
BLD GP AB SCN SERPL QL: NEGATIVE — SIGNIFICANT CHANGE UP
BUN SERPL-MCNC: 6 MG/DL — LOW (ref 7–23)
CALCIUM SERPL-MCNC: 9.3 MG/DL — SIGNIFICANT CHANGE UP (ref 8.4–10.5)
CHLORIDE SERPL-SCNC: 105 MMOL/L — SIGNIFICANT CHANGE UP (ref 98–107)
CO2 SERPL-SCNC: 22 MMOL/L — SIGNIFICANT CHANGE UP (ref 22–31)
CREAT SERPL-MCNC: 0.61 MG/DL — SIGNIFICANT CHANGE UP (ref 0.5–1.3)
EOSINOPHIL # BLD AUTO: 0.02 K/UL — SIGNIFICANT CHANGE UP (ref 0–0.5)
EOSINOPHIL NFR BLD AUTO: 0.1 % — SIGNIFICANT CHANGE UP (ref 0–6)
GLUCOSE SERPL-MCNC: 103 MG/DL — HIGH (ref 70–99)
HCG SERPL-ACNC: <5 MIU/ML — SIGNIFICANT CHANGE UP
HCT VFR BLD CALC: 28.8 % — LOW (ref 34.5–45)
HGB BLD-MCNC: 8.1 G/DL — LOW (ref 11.5–15.5)
IANC: 13.54 K/UL — HIGH (ref 1.5–8.5)
IMM GRANULOCYTES NFR BLD AUTO: 0.4 % — SIGNIFICANT CHANGE UP (ref 0–1.5)
LYMPHOCYTES # BLD AUTO: 13.8 % — SIGNIFICANT CHANGE UP (ref 13–44)
LYMPHOCYTES # BLD AUTO: 2.41 K/UL — SIGNIFICANT CHANGE UP (ref 1–3.3)
MCHC RBC-ENTMCNC: 17.1 PG — LOW (ref 27–34)
MCHC RBC-ENTMCNC: 28.1 GM/DL — LOW (ref 32–36)
MCV RBC AUTO: 60.9 FL — LOW (ref 80–100)
MONOCYTES # BLD AUTO: 1.4 K/UL — HIGH (ref 0–0.9)
MONOCYTES NFR BLD AUTO: 8 % — SIGNIFICANT CHANGE UP (ref 2–14)
NEUTROPHILS # BLD AUTO: 13.54 K/UL — HIGH (ref 1.8–7.4)
NEUTROPHILS NFR BLD AUTO: 77.4 % — HIGH (ref 43–77)
NRBC # BLD: 0 /100 WBCS — SIGNIFICANT CHANGE UP
NRBC # FLD: 0 K/UL — SIGNIFICANT CHANGE UP
PLATELET # BLD AUTO: 638 K/UL — HIGH (ref 150–400)
POTASSIUM SERPL-MCNC: 3.4 MMOL/L — LOW (ref 3.5–5.3)
POTASSIUM SERPL-SCNC: 3.4 MMOL/L — LOW (ref 3.5–5.3)
PROT SERPL-MCNC: 7.7 G/DL — SIGNIFICANT CHANGE UP (ref 6–8.3)
RBC # BLD: 4.73 M/UL — SIGNIFICANT CHANGE UP (ref 3.8–5.2)
RBC # FLD: 20.1 % — HIGH (ref 10.3–14.5)
RH IG SCN BLD-IMP: POSITIVE — SIGNIFICANT CHANGE UP
SODIUM SERPL-SCNC: 141 MMOL/L — SIGNIFICANT CHANGE UP (ref 135–145)
WBC # BLD: 17.49 K/UL — HIGH (ref 3.8–10.5)
WBC # FLD AUTO: 17.49 K/UL — HIGH (ref 3.8–10.5)

## 2021-07-24 PROCEDURE — 88304 TISSUE EXAM BY PATHOLOGIST: CPT | Mod: 26

## 2021-07-24 PROCEDURE — 47562 LAPAROSCOPIC CHOLECYSTECTOMY: CPT | Mod: GC

## 2021-07-24 PROCEDURE — 58660 LAPAROSCOPY LYSIS: CPT | Mod: GC

## 2021-07-24 RX ORDER — HYDROMORPHONE HYDROCHLORIDE 2 MG/ML
0.5 INJECTION INTRAMUSCULAR; INTRAVENOUS; SUBCUTANEOUS ONCE
Refills: 0 | Status: DISCONTINUED | OUTPATIENT
Start: 2021-07-24 | End: 2021-07-24

## 2021-07-24 RX ORDER — POTASSIUM CHLORIDE 20 MEQ
10 PACKET (EA) ORAL
Refills: 0 | Status: COMPLETED | OUTPATIENT
Start: 2021-07-24 | End: 2021-07-24

## 2021-07-24 RX ORDER — OXYCODONE HYDROCHLORIDE 5 MG/1
1 TABLET ORAL
Qty: 6 | Refills: 0
Start: 2021-07-24 | End: 2021-07-25

## 2021-07-24 RX ORDER — HYDROMORPHONE HYDROCHLORIDE 2 MG/ML
0.25 INJECTION INTRAMUSCULAR; INTRAVENOUS; SUBCUTANEOUS
Refills: 0 | Status: DISCONTINUED | OUTPATIENT
Start: 2021-07-24 | End: 2021-07-24

## 2021-07-24 RX ORDER — SODIUM CHLORIDE 9 MG/ML
1000 INJECTION, SOLUTION INTRAVENOUS
Refills: 0 | Status: DISCONTINUED | OUTPATIENT
Start: 2021-07-24 | End: 2021-07-24

## 2021-07-24 RX ORDER — OXYCODONE HYDROCHLORIDE 5 MG/1
5 TABLET ORAL ONCE
Refills: 0 | Status: DISCONTINUED | OUTPATIENT
Start: 2021-07-24 | End: 2021-07-24

## 2021-07-24 RX ADMIN — Medication 1000 MILLIGRAM(S): at 06:36

## 2021-07-24 RX ADMIN — HYDROMORPHONE HYDROCHLORIDE 0.5 MILLIGRAM(S): 2 INJECTION INTRAMUSCULAR; INTRAVENOUS; SUBCUTANEOUS at 10:35

## 2021-07-24 RX ADMIN — PIPERACILLIN AND TAZOBACTAM 25 GRAM(S): 4; .5 INJECTION, POWDER, LYOPHILIZED, FOR SOLUTION INTRAVENOUS at 00:12

## 2021-07-24 RX ADMIN — OXYCODONE HYDROCHLORIDE 5 MILLIGRAM(S): 5 TABLET ORAL at 17:15

## 2021-07-24 RX ADMIN — PIPERACILLIN AND TAZOBACTAM 25 GRAM(S): 4; .5 INJECTION, POWDER, LYOPHILIZED, FOR SOLUTION INTRAVENOUS at 07:49

## 2021-07-24 RX ADMIN — Medication 100 MILLIEQUIVALENT(S): at 11:58

## 2021-07-24 RX ADMIN — Medication 400 MILLIGRAM(S): at 06:06

## 2021-07-24 RX ADMIN — HYDROMORPHONE HYDROCHLORIDE 0.5 MILLIGRAM(S): 2 INJECTION INTRAMUSCULAR; INTRAVENOUS; SUBCUTANEOUS at 04:28

## 2021-07-24 RX ADMIN — OXYCODONE HYDROCHLORIDE 5 MILLIGRAM(S): 5 TABLET ORAL at 18:00

## 2021-07-24 RX ADMIN — HYDROMORPHONE HYDROCHLORIDE 0.5 MILLIGRAM(S): 2 INJECTION INTRAMUSCULAR; INTRAVENOUS; SUBCUTANEOUS at 04:13

## 2021-07-24 RX ADMIN — Medication 400 MILLIGRAM(S): at 12:09

## 2021-07-24 RX ADMIN — Medication 1000 MILLIGRAM(S): at 00:42

## 2021-07-24 RX ADMIN — HYDROMORPHONE HYDROCHLORIDE 0.5 MILLIGRAM(S): 2 INJECTION INTRAMUSCULAR; INTRAVENOUS; SUBCUTANEOUS at 10:50

## 2021-07-24 RX ADMIN — Medication 400 MILLIGRAM(S): at 00:12

## 2021-07-24 RX ADMIN — PIPERACILLIN AND TAZOBACTAM 25 GRAM(S): 4; .5 INJECTION, POWDER, LYOPHILIZED, FOR SOLUTION INTRAVENOUS at 16:00

## 2021-07-24 NOTE — PROGRESS NOTE ADULT - SUBJECTIVE AND OBJECTIVE BOX
Overnight: No acute events overnight.    Subjective:   Patient seen at bedside this AM.     Objective:  Vital Signs  T(C): 37.1 (07-23 @ 22:20), Max: 37.2 (07-23 @ 21:15)  HR: 75 (07-23 @ 22:20) (75 - 90)  BP: 141/82 (07-23 @ 22:20) (115/71 - 141/82)  RR: 17 (07-23 @ 22:20) (16 - 17)  SpO2: 100% (07-23 @ 22:20) (99% - 100%)  07-23-21 @ 07:01  -  07-24-21 @ 00:32  --------------------------------------------------------  IN:  Total IN: 0 mL    OUT:    Voided (mL): 0 mL  Total OUT: 0 mL    Total NET: 0 mL        Physical Exam:  GEN: resting in bed comfortably in NAD  RESP: no increased WOB  ABD: soft, non-distended, non-tender without rebound tenderness or guarding. Incision sites clean, dry, and intact without erythema or edema.  EXTR: warm, well-perfused, no edema  NEURO: awake, alert    Labs:                        8.7    18.36 )-----------( 670      ( 23 Jul 2021 15:08 )             30.8   07-23    139  |  105  |  8   ----------------------------<  116<H>  3.7   |  20<L>  |  0.55    Ca    9.4      23 Jul 2021 15:08    TPro  8.4<H>  /  Alb  4.7  /  TBili  0.5  /  DBili  x   /  AST  22  /  ALT  15  /  AlkPhos  80  07-23    CAPILLARY BLOOD GLUCOSE          Medications:   MEDICATIONS  (STANDING):  enoxaparin Injectable 40 milliGRAM(s) SubCutaneous daily  lactated ringers. 1000 milliLiter(s) (100 mL/Hr) IV Continuous <Continuous>  piperacillin/tazobactam IVPB.. 3.375 Gram(s) IV Intermittent every 8 hours    MEDICATIONS  (PRN):  acetaminophen  IVPB .. 1000 milliGRAM(s) IV Intermittent every 6 hours PRN Mild Pain (1 - 3)      Imaging:     Overnight: No acute events overnight.    Subjective:   Patient seen at bedside this AM, with some RUQ pain treated with pain meds.    Objective:  Vital Signs  T(C): 37.1 (07-23 @ 22:20), Max: 37.2 (07-23 @ 21:15)  HR: 75 (07-23 @ 22:20) (75 - 90)  BP: 141/82 (07-23 @ 22:20) (115/71 - 141/82)  RR: 17 (07-23 @ 22:20) (16 - 17)  SpO2: 100% (07-23 @ 22:20) (99% - 100%)  07-23-21 @ 07:01  -  07-24-21 @ 00:32  --------------------------------------------------------  IN:  Total IN: 0 mL    OUT:    Voided (mL): 0 mL  Total OUT: 0 mL    Total NET: 0 mL        Physical Exam:  GEN: resting in bed comfortably in NAD  RESP: no increased WOB  ABD: soft, non-distended, non-tender without rebound tenderness or guarding. Incision sites clean, dry, and intact without erythema or edema.  EXTR: warm, well-perfused, no edema  NEURO: awake, alert    Labs:                        8.7    18.36 )-----------( 670      ( 23 Jul 2021 15:08 )             30.8   07-23    139  |  105  |  8   ----------------------------<  116<H>  3.7   |  20<L>  |  0.55    Ca    9.4      23 Jul 2021 15:08    TPro  8.4<H>  /  Alb  4.7  /  TBili  0.5  /  DBili  x   /  AST  22  /  ALT  15  /  AlkPhos  80  07-23    CAPILLARY BLOOD GLUCOSE          Medications:   MEDICATIONS  (STANDING):  enoxaparin Injectable 40 milliGRAM(s) SubCutaneous daily  lactated ringers. 1000 milliLiter(s) (100 mL/Hr) IV Continuous <Continuous>  piperacillin/tazobactam IVPB.. 3.375 Gram(s) IV Intermittent every 8 hours    MEDICATIONS  (PRN):  acetaminophen  IVPB .. 1000 milliGRAM(s) IV Intermittent every 6 hours PRN Mild Pain (1 - 3)      Imaging:

## 2021-07-24 NOTE — ASU DISCHARGE PLAN (ADULT/PEDIATRIC) - NO HEAVY LIFTING DURATION
Do not lift anything heavier than 20 lbs for 4-6 weeks or until your surgeon says it is safe to do so

## 2021-07-24 NOTE — BRIEF OPERATIVE NOTE - OPERATION/FINDINGS
Dilated and inflamed gallbladder, needle decompression performed. Small amount of bile spillage suctioned.   Cystic duct and artery taken with 10mm metal clips

## 2021-07-24 NOTE — PROGRESS NOTE ADULT - ASSESSMENT
Assessment:   35y Female no PMH, PSH of C section x2, presented with 1 day of RUQ pain with nausea and vomiting. RUQ tender on exam, positive Og. No fever. WCC 18. RUQ US showing multiple GS with impacted stone in neck. Overall impression consistent with acute cholecystitis.      PLAN (to be discussed with attending in AM):  -  -  -  -  - Assessment:   35y Female no PMH, PSH of C section x2, presented with 1 day of RUQ pain with nausea and vomiting. RUQ tender on exam, positive Og. No fever. WCC 18. RUQ US showing multiple GS with impacted stone in neck. Overall impression consistent with acute cholecystitis.      PLAN (to be discussed with attending in AM):  - OR today  - Patient is pre-oped and consented.  - NPO, IVF  - IV zosyn  - pain control prn  - dvt prophylaxis

## 2021-07-24 NOTE — ASU DISCHARGE PLAN (ADULT/PEDIATRIC) - NURSING INSTRUCTIONS
You received IV Tylenol for pain management at 12pm. Please DO NOT take any Tylenol (Acetaminophen) containing products, such as Vicodin, Percocet, Excedrin, and cold medications for the next 6 hours (until 6 PM). DO NOT TAKE MORE THAN 4000 MG OF TYLENOL in a 24 hour period.

## 2021-07-24 NOTE — BRIEF OPERATIVE NOTE - NSICDXBRIEFPROCEDURE_GEN_ALL_CORE_FT
PROCEDURES:  Cholecystectomy, laparoscopic, using multiple ports 24-Jul-2021 16:01:58  Hakan Ferrara

## 2021-07-24 NOTE — ASU DISCHARGE PLAN (ADULT/PEDIATRIC) - MEDICATION INSTRUCTIONS
Take Tylenol up to 975mg and Ibuprofen up to 600mg every 6 hours, alternating so that you take one medication, then the other every 3 hours. Use Oxycodone only for severe pain not controlled with the Tylenol and Ibuprofen.

## 2021-07-24 NOTE — ASU DISCHARGE PLAN (ADULT/PEDIATRIC) - CARE PROVIDER_API CALL
Jeffery Espinoza)  Surgery  [address] [place] [address]  Phone: [phone]  Fax: [phone]  Follow Up Time: 2 weeks

## 2021-07-24 NOTE — PRE-OP CHECKLIST - 1.
vitals in PAcu AT 12:43 pm, hr-115, pt states she is very nervous, rr-16, bp-149/ 57, sat on room air-99%, temp orally- 98.9F

## 2021-07-26 LAB
COVID-19 SPIKE DOMAIN AB INTERP: NEGATIVE — SIGNIFICANT CHANGE UP
COVID-19 SPIKE DOMAIN ANTIBODY RESULT: 0.4 U/ML — SIGNIFICANT CHANGE UP
SARS-COV-2 IGG+IGM SERPL QL IA: 0.4 U/ML — SIGNIFICANT CHANGE UP
SARS-COV-2 IGG+IGM SERPL QL IA: NEGATIVE — SIGNIFICANT CHANGE UP

## 2021-07-30 LAB — SURGICAL PATHOLOGY STUDY: SIGNIFICANT CHANGE UP

## 2021-08-10 ENCOUNTER — APPOINTMENT (OUTPATIENT)
Dept: TRAUMA SURGERY | Facility: HOSPITAL | Age: 36
End: 2021-08-10
Payer: COMMERCIAL

## 2021-08-10 VITALS
HEIGHT: 61 IN | DIASTOLIC BLOOD PRESSURE: 55 MMHG | SYSTOLIC BLOOD PRESSURE: 125 MMHG | WEIGHT: 185 LBS | BODY MASS INDEX: 34.93 KG/M2 | TEMPERATURE: 97.5 F | HEART RATE: 99 BPM

## 2021-08-10 PROCEDURE — 99024 POSTOP FOLLOW-UP VISIT: CPT

## 2021-08-13 NOTE — HISTORY OF PRESENT ILLNESS
[de-identified] : She has recovered well and denies fevers, chills, abdominal pain, SOB/dyspnea or weakness/fatigue. Tolerating adequate PO intake and GI activity (bowel movements) has recovered without constipation or diarrhea. No complaints regarding incisions and dressings.\par

## 2021-08-13 NOTE — PLAN
[FreeTextEntry1] : Mrs. Mcguire underwent a laparoscopic cholecystectomy and has recovered well. The incisions are well healed and good PO nutrition is being tolerated. The pathology revealed no evidence of malignancy. \par \par Resume normal activity with gradual resumption of strenuous activity\par Avoid fatty foods. Low fat diet should diarrhea develop\par Follow up with me in clinic if yellowing of skin develops or fever/chills and RUQ pain develops. \par \par \par \par Roland Moon MD\par Acute Care Surgery\par

## 2021-08-13 NOTE — PHYSICAL EXAM
[de-identified] : Well ,comfortable. [de-identified] : Soft, nontender, nondistended. The incisions are well healed without signs of erythema, cellulitis or drainage. No palpable hernia formation.\par

## 2021-12-16 NOTE — PATIENT PROFILE OB - PATIENT REPRESENTATIVE: ( YOU CAN CHOOSE ANY PERSON THAT CAN ASSIST YOU WITH YOUR HEALTH CARE PREFERENCES, DOES NOT HAVE TO BE A SPOUSE, IMMEDIATE FAMILY OR SIGNIFICANT OTHER/PARTNER)
Patient scheduled for lab appointment on 2021 ordered by Fatemeh. Orders are . Please enter or extend lab orders prior to the appointment noted above.     Thank you   Yes

## 2021-12-20 ENCOUNTER — EMERGENCY (EMERGENCY)
Facility: HOSPITAL | Age: 36
LOS: 1 days | Discharge: HOME | End: 2021-12-20
Attending: STUDENT IN AN ORGANIZED HEALTH CARE EDUCATION/TRAINING PROGRAM | Admitting: STUDENT IN AN ORGANIZED HEALTH CARE EDUCATION/TRAINING PROGRAM
Payer: COMMERCIAL

## 2021-12-20 DIAGNOSIS — Z98.891 HISTORY OF UTERINE SCAR FROM PREVIOUS SURGERY: Chronic | ICD-10-CM

## 2021-12-20 DIAGNOSIS — K08.499 PARTIAL LOSS OF TEETH DUE TO OTHER SPECIFIED CAUSE, UNSPECIFIED CLASS: Chronic | ICD-10-CM

## 2021-12-20 PROCEDURE — 99284 EMERGENCY DEPT VISIT MOD MDM: CPT

## 2021-12-21 VITALS
HEIGHT: 62 IN | OXYGEN SATURATION: 100 % | DIASTOLIC BLOOD PRESSURE: 82 MMHG | RESPIRATION RATE: 16 BRPM | TEMPERATURE: 99 F | SYSTOLIC BLOOD PRESSURE: 126 MMHG | HEART RATE: 104 BPM

## 2021-12-21 DIAGNOSIS — Z90.49 ACQUIRED ABSENCE OF OTHER SPECIFIED PARTS OF DIGESTIVE TRACT: Chronic | ICD-10-CM

## 2021-12-21 LAB
ALBUMIN SERPL ELPH-MCNC: 4.8 G/DL — SIGNIFICANT CHANGE UP (ref 3.3–5)
ALP SERPL-CCNC: 87 U/L — SIGNIFICANT CHANGE UP (ref 40–120)
ALT FLD-CCNC: 13 U/L — SIGNIFICANT CHANGE UP (ref 4–33)
ANION GAP SERPL CALC-SCNC: 18 MMOL/L — HIGH (ref 7–14)
ANION GAP SERPL CALC-SCNC: 9 MMOL/L — SIGNIFICANT CHANGE UP (ref 7–14)
APPEARANCE UR: ABNORMAL
AST SERPL-CCNC: 20 U/L — SIGNIFICANT CHANGE UP (ref 4–32)
BACTERIA # UR AUTO: ABNORMAL
BASOPHILS # BLD AUTO: 0.02 K/UL — SIGNIFICANT CHANGE UP (ref 0–0.2)
BASOPHILS NFR BLD AUTO: 0.2 % — SIGNIFICANT CHANGE UP (ref 0–2)
BILIRUB SERPL-MCNC: 1.1 MG/DL — SIGNIFICANT CHANGE UP (ref 0.2–1.2)
BILIRUB UR-MCNC: NEGATIVE — SIGNIFICANT CHANGE UP
BLOOD GAS VENOUS COMPREHENSIVE RESULT: SIGNIFICANT CHANGE UP
BLOOD GAS VENOUS COMPREHENSIVE RESULT: SIGNIFICANT CHANGE UP
BUN SERPL-MCNC: 10 MG/DL — SIGNIFICANT CHANGE UP (ref 7–23)
BUN SERPL-MCNC: 11 MG/DL — SIGNIFICANT CHANGE UP (ref 7–23)
CALCIUM SERPL-MCNC: 7.8 MG/DL — LOW (ref 8.4–10.5)
CALCIUM SERPL-MCNC: 9.8 MG/DL — SIGNIFICANT CHANGE UP (ref 8.4–10.5)
CHLORIDE SERPL-SCNC: 103 MMOL/L — SIGNIFICANT CHANGE UP (ref 98–107)
CHLORIDE SERPL-SCNC: 109 MMOL/L — HIGH (ref 98–107)
CO2 SERPL-SCNC: 20 MMOL/L — LOW (ref 22–31)
CO2 SERPL-SCNC: 22 MMOL/L — SIGNIFICANT CHANGE UP (ref 22–31)
COLOR SPEC: YELLOW — SIGNIFICANT CHANGE UP
CREAT SERPL-MCNC: 0.62 MG/DL — SIGNIFICANT CHANGE UP (ref 0.5–1.3)
CREAT SERPL-MCNC: 0.68 MG/DL — SIGNIFICANT CHANGE UP (ref 0.5–1.3)
DIFF PNL FLD: NEGATIVE — SIGNIFICANT CHANGE UP
EOSINOPHIL # BLD AUTO: 0.01 K/UL — SIGNIFICANT CHANGE UP (ref 0–0.5)
EOSINOPHIL NFR BLD AUTO: 0.1 % — SIGNIFICANT CHANGE UP (ref 0–6)
EPI CELLS # UR: 16 /HPF — HIGH (ref 0–5)
GLUCOSE SERPL-MCNC: 86 MG/DL — SIGNIFICANT CHANGE UP (ref 70–99)
GLUCOSE SERPL-MCNC: 91 MG/DL — SIGNIFICANT CHANGE UP (ref 70–99)
GLUCOSE UR QL: NEGATIVE — SIGNIFICANT CHANGE UP
HCT VFR BLD CALC: 37.7 % — SIGNIFICANT CHANGE UP (ref 34.5–45)
HGB BLD-MCNC: 11.3 G/DL — LOW (ref 11.5–15.5)
HYALINE CASTS # UR AUTO: 2 /LPF — SIGNIFICANT CHANGE UP (ref 0–7)
IANC: 7.01 K/UL — SIGNIFICANT CHANGE UP (ref 1.5–8.5)
IMM GRANULOCYTES NFR BLD AUTO: 0.3 % — SIGNIFICANT CHANGE UP (ref 0–1.5)
KETONES UR-MCNC: ABNORMAL
LEUKOCYTE ESTERASE UR-ACNC: NEGATIVE — SIGNIFICANT CHANGE UP
LIDOCAIN IGE QN: 19 U/L — SIGNIFICANT CHANGE UP (ref 7–60)
LYMPHOCYTES # BLD AUTO: 1.27 K/UL — SIGNIFICANT CHANGE UP (ref 1–3.3)
LYMPHOCYTES # BLD AUTO: 14.3 % — SIGNIFICANT CHANGE UP (ref 13–44)
MAGNESIUM SERPL-MCNC: 1.9 MG/DL — SIGNIFICANT CHANGE UP (ref 1.6–2.6)
MCHC RBC-ENTMCNC: 19.2 PG — LOW (ref 27–34)
MCHC RBC-ENTMCNC: 30 GM/DL — LOW (ref 32–36)
MCV RBC AUTO: 63.9 FL — LOW (ref 80–100)
MONOCYTES # BLD AUTO: 0.52 K/UL — SIGNIFICANT CHANGE UP (ref 0–0.9)
MONOCYTES NFR BLD AUTO: 5.9 % — SIGNIFICANT CHANGE UP (ref 2–14)
NEUTROPHILS # BLD AUTO: 7.01 K/UL — SIGNIFICANT CHANGE UP (ref 1.8–7.4)
NEUTROPHILS NFR BLD AUTO: 79.2 % — HIGH (ref 43–77)
NITRITE UR-MCNC: NEGATIVE — SIGNIFICANT CHANGE UP
NRBC # BLD: 0 /100 WBCS — SIGNIFICANT CHANGE UP
NRBC # FLD: 0 K/UL — SIGNIFICANT CHANGE UP
PH UR: 6 — SIGNIFICANT CHANGE UP (ref 5–8)
PLATELET # BLD AUTO: 474 K/UL — HIGH (ref 150–400)
POTASSIUM SERPL-MCNC: 3.3 MMOL/L — LOW (ref 3.5–5.3)
POTASSIUM SERPL-MCNC: 3.6 MMOL/L — SIGNIFICANT CHANGE UP (ref 3.5–5.3)
POTASSIUM SERPL-SCNC: 3.3 MMOL/L — LOW (ref 3.5–5.3)
POTASSIUM SERPL-SCNC: 3.6 MMOL/L — SIGNIFICANT CHANGE UP (ref 3.5–5.3)
PROT SERPL-MCNC: 8.4 G/DL — HIGH (ref 6–8.3)
PROT UR-MCNC: ABNORMAL
RBC # BLD: 5.9 M/UL — HIGH (ref 3.8–5.2)
RBC # FLD: 20.4 % — HIGH (ref 10.3–14.5)
RBC CASTS # UR COMP ASSIST: 0 /HPF — SIGNIFICANT CHANGE UP (ref 0–4)
SARS-COV-2 RNA SPEC QL NAA+PROBE: SIGNIFICANT CHANGE UP
SODIUM SERPL-SCNC: 140 MMOL/L — SIGNIFICANT CHANGE UP (ref 135–145)
SODIUM SERPL-SCNC: 141 MMOL/L — SIGNIFICANT CHANGE UP (ref 135–145)
SP GR SPEC: 1.04 — SIGNIFICANT CHANGE UP (ref 1–1.05)
UROBILINOGEN FLD QL: ABNORMAL
WBC # BLD: 8.86 K/UL — SIGNIFICANT CHANGE UP (ref 3.8–10.5)
WBC # FLD AUTO: 8.86 K/UL — SIGNIFICANT CHANGE UP (ref 3.8–10.5)
WBC UR QL: 7 /HPF — HIGH (ref 0–5)

## 2021-12-21 PROCEDURE — 99220: CPT

## 2021-12-21 RX ORDER — ACETAMINOPHEN 500 MG
1000 TABLET ORAL ONCE
Refills: 0 | Status: COMPLETED | OUTPATIENT
Start: 2021-12-21 | End: 2021-12-21

## 2021-12-21 RX ORDER — POTASSIUM CHLORIDE 20 MEQ
40 PACKET (EA) ORAL ONCE
Refills: 0 | Status: COMPLETED | OUTPATIENT
Start: 2021-12-21 | End: 2021-12-21

## 2021-12-21 RX ORDER — KETOROLAC TROMETHAMINE 30 MG/ML
15 SYRINGE (ML) INJECTION ONCE
Refills: 0 | Status: DISCONTINUED | OUTPATIENT
Start: 2021-12-21 | End: 2021-12-21

## 2021-12-21 RX ORDER — FAMOTIDINE 10 MG/ML
20 INJECTION INTRAVENOUS ONCE
Refills: 0 | Status: COMPLETED | OUTPATIENT
Start: 2021-12-21 | End: 2021-12-21

## 2021-12-21 RX ORDER — SODIUM CHLORIDE 9 MG/ML
1000 INJECTION INTRAMUSCULAR; INTRAVENOUS; SUBCUTANEOUS ONCE
Refills: 0 | Status: COMPLETED | OUTPATIENT
Start: 2021-12-21 | End: 2021-12-21

## 2021-12-21 RX ORDER — ONDANSETRON 8 MG/1
4 TABLET, FILM COATED ORAL ONCE
Refills: 0 | Status: COMPLETED | OUTPATIENT
Start: 2021-12-21 | End: 2021-12-21

## 2021-12-21 RX ORDER — SODIUM CHLORIDE 9 MG/ML
1000 INJECTION, SOLUTION INTRAVENOUS ONCE
Refills: 0 | Status: COMPLETED | OUTPATIENT
Start: 2021-12-21 | End: 2021-12-21

## 2021-12-21 RX ORDER — SODIUM CHLORIDE 9 MG/ML
1000 INJECTION INTRAMUSCULAR; INTRAVENOUS; SUBCUTANEOUS
Refills: 0 | Status: DISCONTINUED | OUTPATIENT
Start: 2021-12-21 | End: 2021-12-24

## 2021-12-21 RX ORDER — POTASSIUM CHLORIDE 20 MEQ
40 PACKET (EA) ORAL ONCE
Refills: 0 | Status: DISCONTINUED | OUTPATIENT
Start: 2021-12-21 | End: 2021-12-21

## 2021-12-21 RX ORDER — ONDANSETRON 8 MG/1
1 TABLET, FILM COATED ORAL
Qty: 9 | Refills: 0
Start: 2021-12-21 | End: 2021-12-23

## 2021-12-21 RX ADMIN — ONDANSETRON 4 MILLIGRAM(S): 8 TABLET, FILM COATED ORAL at 02:39

## 2021-12-21 RX ADMIN — Medication 20 MILLIGRAM(S): at 06:22

## 2021-12-21 RX ADMIN — SODIUM CHLORIDE 1000 MILLILITER(S): 9 INJECTION INTRAMUSCULAR; INTRAVENOUS; SUBCUTANEOUS at 02:35

## 2021-12-21 RX ADMIN — Medication 15 MILLIGRAM(S): at 06:06

## 2021-12-21 RX ADMIN — Medication 1000 MILLIGRAM(S): at 05:16

## 2021-12-21 RX ADMIN — Medication 40 MILLIEQUIVALENT(S): at 14:08

## 2021-12-21 RX ADMIN — SODIUM CHLORIDE 1000 MILLILITER(S): 9 INJECTION, SOLUTION INTRAVENOUS at 07:47

## 2021-12-21 RX ADMIN — FAMOTIDINE 20 MILLIGRAM(S): 10 INJECTION INTRAVENOUS at 06:22

## 2021-12-21 RX ADMIN — Medication 400 MILLIGRAM(S): at 02:39

## 2021-12-21 RX ADMIN — Medication 15 MILLIGRAM(S): at 06:21

## 2021-12-21 RX ADMIN — Medication 1000 MILLIGRAM(S): at 02:54

## 2021-12-21 RX ADMIN — SODIUM CHLORIDE 1000 MILLILITER(S): 9 INJECTION INTRAMUSCULAR; INTRAVENOUS; SUBCUTANEOUS at 05:39

## 2021-12-21 RX ADMIN — SODIUM CHLORIDE 125 MILLILITER(S): 9 INJECTION INTRAMUSCULAR; INTRAVENOUS; SUBCUTANEOUS at 10:33

## 2021-12-21 RX ADMIN — SODIUM CHLORIDE 125 MILLILITER(S): 9 INJECTION INTRAMUSCULAR; INTRAVENOUS; SUBCUTANEOUS at 18:08

## 2021-12-21 NOTE — ED PROVIDER NOTE - PATIENT PORTAL LINK FT
You can access the FollowMyHealth Patient Portal offered by [facility] by registering at the following website: [email or web address]. By joining [employer]’s FollowMyHealth portal, you will also be able to view your health information using other applications (apps) compatible with our system.

## 2021-12-21 NOTE — ED PROVIDER NOTE - PROGRESS NOTE DETAILS
Nausea resolved. Pt drank water and ate crackers. Pt has not had urge to urinate yet. 2nd liter IVF started. +LUQ TTP with soft abdomen. Toradol, Pepcid and Bentyl for Sx. MARTINE. After 2L patient does not have the sensation to urinate. Ambulated patient to restroom to try. Pt needed to hold onto walls because she feels weak and dizzy. No diarrhea overnight. 3rd liter ordered. MARTINE. After 2L patient does not have the sensation to urinate. Ambulated patient to restroom to try. Pt needed to hold onto walls because she feels weak and dizzy. No diarrhea overnight. 3rd liter ordered. No observation unit beds available this AMArden [facility]. Corey PGY3 - Pt. evaluated by CDU team, accepted. Corey PGY3 - NO covid test ordered.  Pt. states she already had one done today.  Pt. refusing a re-swab and stating she wants to go home.  Will dc, as pt. has improvement in dizziness and weakness. Corey PGY3 - Pt. now agreeable to staying if Dr. Parikh does the covid swab.  Will dispo back to obs. Dr. Parikh: Pt signed out to me at 8 AM from Dr. Cano with plan to follow up with CDU for possible CDU admission for gastroenteritis.  I went to speak with pt regarding CDU placement and she is upset because Covid swab that was done at ED presentation was not sent.  I apologized to pt and explained that for CDU placement we require a Covid swab.  Pt initially declined repeat swab and stated that she would like to go home, but she admitted that she does not feel well still.  After discussion pt agreed to allow me to perform repeat Covid swab, which I did.  Pt to be dispo to CDU.

## 2021-12-21 NOTE — ED ADULT NURSE REASSESSMENT NOTE - NS ED NURSE REASSESS COMMENT FT1
Pt awake and alert, breathing even and unlabored. VS as noted. Pt appears well in ED awaiting RVP results for Observation
stephenie baez.Recieved report from dionna. Patient is a*ox4 in bed awaiting results. Patient denies pain at this moment. Patient stated that she urinated this morning at 8 but did not collect. Educated patient.
Pt a&ox4, resting in bed, endorses 7/10 pain located to the abdomen at this time, Dr. Lainez is notified, results on pregnancy test will determine medical interventions. Respirations are even and unlabored, no signs of respiratory distress.

## 2021-12-21 NOTE — ED CDU PROVIDER INITIAL DAY NOTE - OBJECTIVE STATEMENT
35 y/o female with no significant PMHx presents to the ER c/o nausea, vomiting and diarrhea that started yesterday after eating at a work party.  Pt states other members at the party also developed similar symptoms. Pt denies fevers, chills, cough, abdominal pain, chest pain, sob.

## 2021-12-21 NOTE — ED CDU PROVIDER INITIAL DAY NOTE - DETAILS
35 y/o female with no significant PMHx presents to the ER c/o nausea, vomiting and diarrhea that started yesterday after eating at a work party.  Pt placed in CDU for IVF, supportive care.

## 2021-12-21 NOTE — ED ADULT NURSE NOTE - OBJECTIVE STATEMENT
Pt received to room 1. A&Ox3. Ambulatory at baseline. Denies pmh. Pt c/o nausea, vomiting, chills, and diarrhea since 130pm 12/20. Pt states she was eating pasta for lunch at her job and immediately afterwards the pt states she began vomiting. Pt endorses abd pain is generalized and is 10/10 "sharp-shooting, non-radiating." Pt denies recent travel and being near sick individuals. Pt denies chest pain, SOB, headache, numbness/tingling, visual disturbances. Pt resp even unlabored, breath sounds clear anterior and posterior, abd soft tender to touch r/t pt's pain, bowel sounds heard in all four quadrants, pedal pulses 2+ bilaterally. Awaiting further orders from MD.

## 2021-12-21 NOTE — ED ADULT TRIAGE NOTE - CHIEF COMPLAINT QUOTE
presents for NVD. since 130PM. also endorsing abdominal pain. denies blood in stool or vomit. no pMHx.

## 2021-12-21 NOTE — ED CDU PROVIDER INITIAL DAY NOTE - MEDICAL DECISION MAKING DETAILS
37 y/o female with no significant PMHx presents to the ER c/o nausea, vomiting and diarrhea that started yesterday after eating at a work party.  Pt placed in CDU for IVF, supportive care.

## 2021-12-21 NOTE — ED PROVIDER NOTE - PHYSICAL EXAMINATION
[Const] well-appearing, resting comfortably, no acute distress  [HEENT] PERRL, EOMI, moist mucus membranes  [Neck] Supple, trachea midline  [CV] +S1/S2, no m/r/g appreciated  [Lungs] Clear to auscultations bilaterally, no adventitious lung sounds  [Abd] minimal diffuse ttp, no rebound/guarding or peritoneal signs  [MSK] 5/5 upper extremity and lower extremity str bilaterally  [Skin] warm, dry, well-perfused  [Neuro] A&Ox3

## 2021-12-21 NOTE — ED PROVIDER NOTE - NSFOLLOWUPINSTRUCTIONS_ED_ALL_ED_FT
You were seen for nausea, vomiting, and diarrhea.  This is likely due to food poisoning.    Your work-up in the ED did not reveal anything acutely concerning.    We have given you fluids and made you feel better.    Follow-up with your PCP in the next 2-3 days and bring a copy of your results.    If you have any concerning symptoms, seek immediate medical attention.

## 2021-12-21 NOTE — ED CDU PROVIDER INITIAL DAY NOTE - NSICDXFAMILYHX_GEN_ALL_CORE_FT
FAMILY HISTORY:  Mother  Still living? Unknown  FH: coronary artery disease, Age at diagnosis: Age Unknown  FH: diabetes mellitus, Age at diagnosis: Age Unknown

## 2021-12-21 NOTE — ED CDU PROVIDER INITIAL DAY NOTE - PROGRESS NOTE DETAILS
RAJAN Paz: pt resting comfortably NAD, pt states she feels better, pt able to tolerate PO.  Will continue to monitor.

## 2021-12-21 NOTE — ED PROVIDER NOTE - OBJECTIVE STATEMENT
35 y/o F presents with nausea/vomiting/diarrhea since yesterday afternoon after eating pasta. has had 11 episodes of diarrhea, no hematochezia/melena, no blood in vomitus. denies fevers/chills, urinary symptoms, LMP 1 month ago. passing gas, no cp pressure palpitations or shortness of breath, covid-vaccinated.     pmh: denies  meds: denies  allergies: denies  surg: cholecystectomy,   social: denies smoking drinking or illicit drugs

## 2021-12-21 NOTE — ED CDU PROVIDER INITIAL DAY NOTE - ATTENDING CONTRIBUTION TO CARE
Dr. Parikh: 35 yo female with no sig PMH in ED with N/V/D beginning yesterday after eating at a work party.  Pt found out that multiple coworkers have similar symptoms.  No fever, CP/SOB or abdominal pain.  Pt treated symptomatically in ED but symptoms not resolving sufficiently and so placed in CDU for IVF and supportive care.  On exam pt overall well appearing, in NAD, heart RRR, lungs CTAB, abd NTND, extremities without swelling, strength 5/5 in all extremities and skin without rash.

## 2021-12-21 NOTE — ED PROVIDER NOTE - CLINICAL SUMMARY MEDICAL DECISION MAKING FREE TEXT BOX
37 y/o F w/ n/v/d after eating pasta yesterday afternoon, abd minimal ttp, non-peritoneal, vss. obtain labwork, symptomatic support, if symptoms severe and do not improve, consider imaging.

## 2021-12-22 VITALS
OXYGEN SATURATION: 100 % | RESPIRATION RATE: 19 BRPM | DIASTOLIC BLOOD PRESSURE: 62 MMHG | TEMPERATURE: 99 F | SYSTOLIC BLOOD PRESSURE: 117 MMHG | HEART RATE: 87 BPM

## 2021-12-22 LAB
ANION GAP SERPL CALC-SCNC: 10 MMOL/L — SIGNIFICANT CHANGE UP (ref 7–14)
BASOPHILS # BLD AUTO: 0.01 K/UL — SIGNIFICANT CHANGE UP (ref 0–0.2)
BASOPHILS NFR BLD AUTO: 0.2 % — SIGNIFICANT CHANGE UP (ref 0–2)
BUN SERPL-MCNC: 4 MG/DL — LOW (ref 7–23)
CALCIUM SERPL-MCNC: 8.3 MG/DL — LOW (ref 8.4–10.5)
CHLORIDE SERPL-SCNC: 111 MMOL/L — HIGH (ref 98–107)
CO2 SERPL-SCNC: 20 MMOL/L — LOW (ref 22–31)
CREAT SERPL-MCNC: 0.55 MG/DL — SIGNIFICANT CHANGE UP (ref 0.5–1.3)
EOSINOPHIL # BLD AUTO: 0.1 K/UL — SIGNIFICANT CHANGE UP (ref 0–0.5)
EOSINOPHIL NFR BLD AUTO: 1.9 % — SIGNIFICANT CHANGE UP (ref 0–6)
GLUCOSE SERPL-MCNC: 88 MG/DL — SIGNIFICANT CHANGE UP (ref 70–99)
HCT VFR BLD CALC: 31.2 % — LOW (ref 34.5–45)
HGB BLD-MCNC: 9.3 G/DL — LOW (ref 11.5–15.5)
IANC: 2.73 K/UL — SIGNIFICANT CHANGE UP (ref 1.5–8.5)
IMM GRANULOCYTES NFR BLD AUTO: 0.4 % — SIGNIFICANT CHANGE UP (ref 0–1.5)
LYMPHOCYTES # BLD AUTO: 1.92 K/UL — SIGNIFICANT CHANGE UP (ref 1–3.3)
LYMPHOCYTES # BLD AUTO: 35.6 % — SIGNIFICANT CHANGE UP (ref 13–44)
MCHC RBC-ENTMCNC: 19.4 PG — LOW (ref 27–34)
MCHC RBC-ENTMCNC: 29.8 GM/DL — LOW (ref 32–36)
MCV RBC AUTO: 65 FL — LOW (ref 80–100)
MONOCYTES # BLD AUTO: 0.61 K/UL — SIGNIFICANT CHANGE UP (ref 0–0.9)
MONOCYTES NFR BLD AUTO: 11.3 % — SIGNIFICANT CHANGE UP (ref 2–14)
NEUTROPHILS # BLD AUTO: 2.73 K/UL — SIGNIFICANT CHANGE UP (ref 1.8–7.4)
NEUTROPHILS NFR BLD AUTO: 50.6 % — SIGNIFICANT CHANGE UP (ref 43–77)
NRBC # BLD: 0 /100 WBCS — SIGNIFICANT CHANGE UP
NRBC # FLD: 0 K/UL — SIGNIFICANT CHANGE UP
PLATELET # BLD AUTO: 383 K/UL — SIGNIFICANT CHANGE UP (ref 150–400)
POTASSIUM SERPL-MCNC: 3.9 MMOL/L — SIGNIFICANT CHANGE UP (ref 3.5–5.3)
POTASSIUM SERPL-SCNC: 3.9 MMOL/L — SIGNIFICANT CHANGE UP (ref 3.5–5.3)
RBC # BLD: 4.8 M/UL — SIGNIFICANT CHANGE UP (ref 3.8–5.2)
RBC # FLD: 19.4 % — HIGH (ref 10.3–14.5)
SODIUM SERPL-SCNC: 141 MMOL/L — SIGNIFICANT CHANGE UP (ref 135–145)
WBC # BLD: 5.39 K/UL — SIGNIFICANT CHANGE UP (ref 3.8–10.5)
WBC # FLD AUTO: 5.39 K/UL — SIGNIFICANT CHANGE UP (ref 3.8–10.5)

## 2021-12-22 PROCEDURE — 99217: CPT

## 2021-12-22 NOTE — ED CDU PROVIDER SUBSEQUENT DAY NOTE - PHYSICAL EXAMINATION
Vital signs reviewed.   CONSTITUTIONAL: Well-appearing; well-nourished; in no apparent distress. Non-toxic appearing.   HEAD: Normocephalic, atraumatic.  EYES: Normal conjunctiva and no sclera injection noted  ENT: normal nose; no rhinorrhea  CARD: Normal S1, S2  RESP: Normal chest excursion with respiration; breath sounds clear and equal bilaterally;  EXT/MS: moves all extremities; distal pulses are normal, no pedal edema.  SKIN: Normal for age and race; warm; dry; good turgor; no apparent lesions or exudate noted.  NEURO: Awake, alert, oriented x 3  PSYCH: Normal mood; appropriate affect.

## 2021-12-22 NOTE — ED CDU PROVIDER DISPOSITION NOTE - NSFOLLOWUPINSTRUCTIONS_ED_ALL_ED_FT
Follow up with your PMD within 48-72 hrs. Show copies of your reports given to you. Take all of your medications as previously prescribed.    If you have any new, worsened or concerning symptoms, please return to the emergency department immediately.       Acute Diarrhea    WHAT YOU NEED TO KNOW:    Acute diarrhea starts quickly and lasts a short time, usually 1 to 3 days. It can last up to 2 weeks. You may not be able to control your diarrhea. Acute diarrhea usually stops on its own.     DISCHARGE INSTRUCTIONS:    Return to the emergency department if:   •You feel confused.       •Your heartbeat is faster than usual.       •Your eyes look deeply sunken, or you have no tears when you cry.       •You urinate less than usual, or your urine is dark yellow.       •You have blood or mucus in your bowel movements.      •You have severe abdominal pain.       •You are unable to drink any liquids.       Contact your healthcare provider if:   •Your symptoms do not get better with treatment.       •You have a fever higher than 101.3°F (38.5°C).       •You have trouble eating and drinking because you are vomiting.       •Your diarrhea does not get better in 7 days.       •You have questions or concerns about your condition or care.       Follow up with your healthcare provider as directed: Write down your questions so you remember to ask them during your visits.     Medicines:  •Diarrhea medicine is an over-the-counter medicine that helps slow or stop your diarrhea. Do not take this medicine unless your healthcare provider says it is okay.       •Antibiotics may be given to help treat an infection caused by bacteria.       •Antiparasitics may be given to treat an infection caused by parasites.       •Take your medicine as directed. Contact your healthcare provider if you think your medicine is not helping or if you have side effects. Tell him of her if you are allergic to any medicine. Keep a list of the medicines, vitamins, and herbs you take. Include the amounts, and when and why you take them. Bring the list or the pill bottles to follow-up visits. Carry your medicine list with you in case of an emergency.      Self-care:   •Drink liquids as directed. Liquids will help prevent dehydration caused by diarrhea. Ask your healthcare provider how much liquid to drink each day and which liquids are best for you. You may need to drink an oral rehydration solution (ORS). An ORS has the right amounts of water, salts, and sugar you need to replace body fluids. You can buy an ORS at most grocery stores and pharmacies.       •Eat foods that are easy to digest. Examples include rice, lentils, cereal, bananas, potatoes, and bread. It also includes some fruits (bananas, melon), well-cooked vegetables, and lean meats. Do not eat foods high in fiber, fat, and sugar. Do not drink alcohol until your diarrhea is gone.

## 2021-12-22 NOTE — ED CDU PROVIDER DISPOSITION NOTE - ATTENDING CONTRIBUTION TO CARE
ATTENDING, MD Lucy: I have personally performed a face to face diagnostic evaluation on this patient. I performed a substantive portion of the visit including all aspects of the medical decision making. I have reviewed the ACP note and agree with the history, exam, and plan of care, except as noted here. Progress notes and further evaluation to be reviewed by observation and discharging attending.

## 2021-12-22 NOTE — ED CDU PROVIDER SUBSEQUENT DAY NOTE - PROGRESS NOTE DETAILS
Pt reassessed during morning rounds, states she feels much better. reports soft stool. Denies nausea, vomiting or abdominal pain. Tolerating oral intake. Abdomen soft nontender. stable for dc home. PMD follow up. Strict return precautions.

## 2021-12-22 NOTE — ED CDU PROVIDER DISPOSITION NOTE - PATIENT PORTAL LINK FT
You can access the FollowMyHealth Patient Portal offered by [facility] by registering at the following website: [email or web address]. By joining [employer]’s FollowMyHealth portal, you will also be able to view your health information using other applications (apps) compatible with our system.

## 2021-12-22 NOTE — ED CDU PROVIDER SUBSEQUENT DAY NOTE - ATTENDING CONTRIBUTION TO CARE
08-Oct-2018
ATTENDING, MD Lucy: I have personally performed a face to face diagnostic evaluation on this patient. I performed a substantive portion of the visit including all aspects of the medical decision making. I have reviewed the ACP note and agree with the history, exam, and plan of care, except as noted here. Progress notes and further evaluation to be reviewed by observation and discharging attending.    PO challenge, succeeded. No more pain, pt c/o watery frequent diarrhea, given return precaution and noticed importance of GI follow up for potent chronic inflammatory pathology, such as IBD, pt showed understanding.

## 2021-12-22 NOTE — ED CDU PROVIDER SUBSEQUENT DAY NOTE - HISTORY
37 y/o F presents with nausea/vomiting/diarrhea since yesterday afternoon after eating pasta. Dispo'd to CDU for nausea/vomiting

## 2021-12-22 NOTE — ED CDU PROVIDER SUBSEQUENT DAY NOTE - NS ED ROS FT
Constitutional: (-) fever   Head: Normal cephalic, Atraumatic  Cardiovascular: (-) chest pain, (-) wheezing  Respiratory: (-) cough, (-) shortness of breath  Gastrointestinal: (+) nausea(+) vomiting, (-) diarrhea, (-) abdominal pain  : (-) dysuria   Musculoskeletal: (-) back pain  Integumentary: (-) rash, (-) edema  Neurological: (-)loc  Allergic/Immunologic: (-) pruritus

## 2022-01-03 ENCOUNTER — APPOINTMENT (OUTPATIENT)
Dept: GASTROENTEROLOGY | Facility: CLINIC | Age: 37
End: 2022-01-03
Payer: COMMERCIAL

## 2022-01-03 VITALS
TEMPERATURE: 97.1 F | SYSTOLIC BLOOD PRESSURE: 115 MMHG | WEIGHT: 182 LBS | BODY MASS INDEX: 34.36 KG/M2 | DIASTOLIC BLOOD PRESSURE: 86 MMHG | HEIGHT: 61 IN

## 2022-01-03 DIAGNOSIS — R73.9 HYPERGLYCEMIA, UNSPECIFIED: ICD-10-CM

## 2022-01-03 DIAGNOSIS — D64.9 ANEMIA, UNSPECIFIED: ICD-10-CM

## 2022-01-03 DIAGNOSIS — D50.8 OTHER IRON DEFICIENCY ANEMIAS: ICD-10-CM

## 2022-01-03 PROCEDURE — 99202 OFFICE O/P NEW SF 15 MIN: CPT

## 2022-01-03 NOTE — ASSESSMENT
[FreeTextEntry1] : The patient is having some dyspeptic symptoms and maldigestion possibly on the basis of a previous cholecystectomy.  She may also have some underlying irritable bowel evidenced by the fact that her bowel movements are sometimes erratic.  She is also lactose intolerant.  I reassured the patient that some abnormalities of the bowel are common after cholecystectomy.  The patient was told to start a high potency probiotic and soluble fiber.  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  I offered the patient an antispasmodic but she prefers trying the natural remedies first.  If this is not helpful then she will be giving a low-dose antispasmodic that she could use for symptoms of cramping and gassiness or proactively.  If the symptoms persist then we will need to do further testing including imaging and/or colonoscopy.  The patient will get back to me in 2 weeks.

## 2022-01-03 NOTE — HISTORY OF PRESENT ILLNESS
[FreeTextEntry1] : I saw your patient Ava Mcguire in the office today.  The patient is a 36-year-old female with no history of hypertension diabetes or coronary artery disease.  The patient's appetite is generally good with no dysphagia or unexplained weight loss and she has gained weight during the Covid pandemic.  The patient has not seen her gynecologist since the birth of her child approximately 2 years ago.  She states that her periods may be occasionally heavy.  The patient's bowel movements are normal but occasionally she notices tenesmus with a loose bowel movement.  She is lactose intolerant and only has dairy products when she is at home and has easy access to a restroom.  The patient had a cholecystectomy done earlier in the year and this is left her with some abdominal discomfort and maldigestion.  The patient had one episode which is reminiscent of the biliary colic but this has not been a problem since.  The patient notices that fatty foods and salads do not agree with her.  The patient has 1 to 2 cups of caffeine a day no ethanol and no tobacco.

## 2022-02-01 ENCOUNTER — APPOINTMENT (OUTPATIENT)
Dept: GASTROENTEROLOGY | Facility: CLINIC | Age: 37
End: 2022-02-01

## 2022-02-15 ENCOUNTER — APPOINTMENT (OUTPATIENT)
Dept: GASTROENTEROLOGY | Facility: CLINIC | Age: 37
End: 2022-02-15

## 2022-06-01 NOTE — ED ADULT TRIAGE NOTE - PAIN: PRESENCE, MLM
6/1/2022                                           To Whom It May Concern:    Romel John was in the Clinic for an appointment today.      Sincerely,        Betty Pressley MD  Pediatrics Department    [address]  [address] [phone]                                                                                                        
complains of pain/discomfort

## 2022-07-01 ENCOUNTER — APPOINTMENT (OUTPATIENT)
Dept: GASTROENTEROLOGY | Facility: CLINIC | Age: 37
End: 2022-07-01

## 2022-07-01 VITALS
DIASTOLIC BLOOD PRESSURE: 70 MMHG | HEIGHT: 62 IN | OXYGEN SATURATION: 99 % | HEART RATE: 104 BPM | SYSTOLIC BLOOD PRESSURE: 100 MMHG | BODY MASS INDEX: 33.31 KG/M2 | TEMPERATURE: 97.1 F | WEIGHT: 181 LBS

## 2022-07-01 DIAGNOSIS — K59.9 FUNCTIONAL INTESTINAL DISORDER, UNSPECIFIED: ICD-10-CM

## 2022-07-01 PROCEDURE — 99213 OFFICE O/P EST LOW 20 MIN: CPT

## 2022-07-01 NOTE — ASSESSMENT
[FreeTextEntry1] : Mrs. Mcguire is a 36-year-old female who generally enjoys good health.  Patient has noted that after her cholecystectomy last year she has developed tenesmus with postprandial bowel movements.  The patient had originally opted for conservative management but did not respond to high potency probiotic and fiber.  Patient agrees to give a trial of cholestyramine for suspected bile induced issues.  If this is not effective we may consider using a low-dose antispasmodic and at that point we will perform formal stool testing and possible imaging.  The care was discussed with the patient and she will call me in several weeks with a progress report after trying the cholestyramine.

## 2022-07-01 NOTE — HISTORY OF PRESENT ILLNESS
[FreeTextEntry1] : I saw patient Ava Mcguire in follow-up today.  Patient is a 36-year-old female generally enjoys good health she had been seen recently for bowel issues which included postprandial bowel movements which tenesmus.  This became more problematic after her gallbladder surgery last year.  There is no blood in the stool weight loss or nocturnal bowel movements.  The patient was instructed to use soluble fiber and a high potency probiotic with no amelioration of the symptoms.  The patient currently denies any abdominal pain or discomfort.  Ava does not abuse tobacco caffeine or ethanol.

## 2022-07-18 ENCOUNTER — NON-APPOINTMENT (OUTPATIENT)
Age: 37
End: 2022-07-18

## 2022-09-16 ENCOUNTER — OUTPATIENT (OUTPATIENT)
Dept: OUTPATIENT SERVICES | Facility: HOSPITAL | Age: 37
LOS: 1 days | Discharge: HOME | End: 2022-09-16

## 2022-09-16 DIAGNOSIS — Z98.891 HISTORY OF UTERINE SCAR FROM PREVIOUS SURGERY: Chronic | ICD-10-CM

## 2022-09-16 DIAGNOSIS — Z90.49 ACQUIRED ABSENCE OF OTHER SPECIFIED PARTS OF DIGESTIVE TRACT: Chronic | ICD-10-CM

## 2022-09-16 DIAGNOSIS — K08.499 PARTIAL LOSS OF TEETH DUE TO OTHER SPECIFIED CAUSE, UNSPECIFIED CLASS: Chronic | ICD-10-CM

## 2022-09-16 DIAGNOSIS — D47.3 ESSENTIAL (HEMORRHAGIC) THROMBOCYTHEMIA: ICD-10-CM

## 2022-09-19 ENCOUNTER — APPOINTMENT (OUTPATIENT)
Dept: HEMATOLOGY ONCOLOGY | Facility: CLINIC | Age: 37
End: 2022-09-19

## 2022-11-28 ENCOUNTER — NON-APPOINTMENT (OUTPATIENT)
Age: 37
End: 2022-11-28

## 2022-12-05 ENCOUNTER — APPOINTMENT (OUTPATIENT)
Dept: GASTROENTEROLOGY | Facility: CLINIC | Age: 37
End: 2022-12-05

## 2022-12-05 VITALS
HEIGHT: 62 IN | TEMPERATURE: 96.8 F | SYSTOLIC BLOOD PRESSURE: 92 MMHG | DIASTOLIC BLOOD PRESSURE: 60 MMHG | HEART RATE: 110 BPM | WEIGHT: 190.4 LBS | OXYGEN SATURATION: 99 % | BODY MASS INDEX: 35.04 KG/M2

## 2022-12-05 DIAGNOSIS — K90.89 OTHER INTESTINAL MALABSORPTION: ICD-10-CM

## 2022-12-05 DIAGNOSIS — Z82.49 FAMILY HISTORY OF ISCHEMIC HEART DISEASE AND OTHER DISEASES OF THE CIRCULATORY SYSTEM: ICD-10-CM

## 2022-12-05 DIAGNOSIS — Z84.2 FAMILY HISTORY OF OTHER DISEASES OF THE GENITOURINARY SYSTEM: ICD-10-CM

## 2022-12-05 DIAGNOSIS — K58.9 IRRITABLE BOWEL SYNDROME W/OUT DIARRHEA: ICD-10-CM

## 2022-12-05 DIAGNOSIS — Z83.49 FAMILY HISTORY OF OTHER ENDOCRINE, NUTRITIONAL AND METABOLIC DISEASES: ICD-10-CM

## 2022-12-05 DIAGNOSIS — Z83.3 FAMILY HISTORY OF DIABETES MELLITUS: ICD-10-CM

## 2022-12-05 DIAGNOSIS — Z78.9 OTHER SPECIFIED HEALTH STATUS: ICD-10-CM

## 2022-12-05 PROCEDURE — 99213 OFFICE O/P EST LOW 20 MIN: CPT

## 2022-12-05 RX ORDER — HYDROCORTISONE ACETATE, PRAMOXINE HCL 2.5; 1 G/100G; G/100G
2.5-1 CREAM TOPICAL
Qty: 57 | Refills: 0 | Status: DISCONTINUED | COMMUNITY
Start: 2019-04-18 | End: 2022-12-05

## 2022-12-05 RX ORDER — LIDOCAINE 5 G/100G
5 OINTMENT TOPICAL
Qty: 35 | Refills: 0 | Status: DISCONTINUED | COMMUNITY
Start: 2019-04-17 | End: 2022-12-05

## 2022-12-05 RX ORDER — HYDROMORPHONE HYDROCHLORIDE 2 MG/1
2 TABLET ORAL EVERY 6 HOURS
Qty: 6 | Refills: 0 | Status: DISCONTINUED | COMMUNITY
Start: 2019-12-28 | End: 2022-12-05

## 2022-12-05 RX ORDER — DOCUSATE SODIUM 100 MG/1
100 CAPSULE ORAL TWICE DAILY
Qty: 60 | Refills: 3 | Status: DISCONTINUED | COMMUNITY
Start: 2019-07-01 | End: 2022-12-05

## 2022-12-05 NOTE — PHYSICAL EXAM
[Alert] : alert [Healthy Appearing] : healthy appearing [No Acute Distress] : no acute distress [Normal] : heart rate was normal and rhythm regular, normal S1 and S2, no murmurs [Bowel Sounds] : normal bowel sounds [No Masses] : no abdominal mass palpated [Abdomen Soft] : soft

## 2022-12-05 NOTE — ASSESSMENT
[FreeTextEntry1] : Ava  is a 37-year-old female who generally enjoys good health.  The patient was diagnosed with bile absorption issues after a cholecystectomy and was doing very well with 1 dosage of cholestyramine daily.  I feel that the patient's most recent exacerbation of her symptoms are on the basis of her underlying flu and  gastroenteritis.  Now that the patient is eating normally she will resume her cholestyramine and get back to me with any change in her clinical status.  The patient inquired about the length of time necessary to continue to cholestyramine and I stated that that there is no definitive timeframe.  Eventually her bile duct and biliary system  may readapt to the absence of the gallbladder and she may be able to decrease or stop the medication in the future.

## 2023-01-05 ENCOUNTER — APPOINTMENT (OUTPATIENT)
Dept: OBGYN | Facility: CLINIC | Age: 38
End: 2023-01-05

## 2023-02-20 ENCOUNTER — NON-APPOINTMENT (OUTPATIENT)
Age: 38
End: 2023-02-20

## 2023-02-22 RX ORDER — ALBUTEROL SULFATE 90 UG/1
108 (90 BASE) INHALANT RESPIRATORY (INHALATION)
Qty: 7 | Refills: 0 | Status: ACTIVE | COMMUNITY
Start: 2022-11-29

## 2023-02-22 RX ORDER — AMOXICILLIN AND CLAVULANATE POTASSIUM 875; 125 MG/1; MG/1
875-125 TABLET, COATED ORAL
Qty: 28 | Refills: 0 | Status: COMPLETED | COMMUNITY
Start: 2022-09-16 | End: 2023-02-22

## 2023-02-22 RX ORDER — FLUTICASONE PROPIONATE 50 UG/1
50 SPRAY, METERED NASAL
Qty: 16 | Refills: 0 | Status: ACTIVE | COMMUNITY
Start: 2022-11-29

## 2023-02-23 ENCOUNTER — APPOINTMENT (OUTPATIENT)
Dept: OBGYN | Facility: CLINIC | Age: 38
End: 2023-02-23

## 2023-04-05 ENCOUNTER — NON-APPOINTMENT (OUTPATIENT)
Age: 38
End: 2023-04-05

## 2023-04-06 ENCOUNTER — EMERGENCY (EMERGENCY)
Facility: HOSPITAL | Age: 38
LOS: 1 days | Discharge: HOME | End: 2023-04-06
Attending: STUDENT IN AN ORGANIZED HEALTH CARE EDUCATION/TRAINING PROGRAM
Payer: COMMERCIAL

## 2023-04-06 VITALS
SYSTOLIC BLOOD PRESSURE: 120 MMHG | TEMPERATURE: 98 F | RESPIRATION RATE: 78 BRPM | OXYGEN SATURATION: 100 % | DIASTOLIC BLOOD PRESSURE: 78 MMHG

## 2023-04-06 VITALS
HEART RATE: 100 BPM | SYSTOLIC BLOOD PRESSURE: 118 MMHG | DIASTOLIC BLOOD PRESSURE: 78 MMHG | RESPIRATION RATE: 18 BRPM | WEIGHT: 195.11 LBS | TEMPERATURE: 98 F | OXYGEN SATURATION: 96 % | HEIGHT: 68 IN

## 2023-04-06 DIAGNOSIS — Z98.891 HISTORY OF UTERINE SCAR FROM PREVIOUS SURGERY: Chronic | ICD-10-CM

## 2023-04-06 DIAGNOSIS — K08.499 PARTIAL LOSS OF TEETH DUE TO OTHER SPECIFIED CAUSE, UNSPECIFIED CLASS: Chronic | ICD-10-CM

## 2023-04-06 DIAGNOSIS — Z90.49 ACQUIRED ABSENCE OF OTHER SPECIFIED PARTS OF DIGESTIVE TRACT: Chronic | ICD-10-CM

## 2023-04-06 PROCEDURE — 29515 APPLICATION SHORT LEG SPLINT: CPT | Mod: RT

## 2023-04-06 PROCEDURE — 99284 EMERGENCY DEPT VISIT MOD MDM: CPT | Mod: 25

## 2023-04-06 PROCEDURE — 73630 X-RAY EXAM OF FOOT: CPT

## 2023-04-06 PROCEDURE — 73610 X-RAY EXAM OF ANKLE: CPT

## 2023-04-06 PROCEDURE — 73610 X-RAY EXAM OF ANKLE: CPT | Mod: 26,LT

## 2023-04-06 PROCEDURE — 73590 X-RAY EXAM OF LOWER LEG: CPT | Mod: 26,LT

## 2023-04-06 PROCEDURE — 73590 X-RAY EXAM OF LOWER LEG: CPT

## 2023-04-06 PROCEDURE — 73630 X-RAY EXAM OF FOOT: CPT | Mod: 26,LT

## 2023-04-06 RX ORDER — OXYCODONE HYDROCHLORIDE 5 MG/1
5 TABLET ORAL ONCE
Refills: 0 | Status: DISCONTINUED | OUTPATIENT
Start: 2023-04-06 | End: 2023-04-06

## 2023-04-06 RX ORDER — ONDANSETRON 8 MG/1
4 TABLET, FILM COATED ORAL ONCE
Refills: 0 | Status: COMPLETED | OUTPATIENT
Start: 2023-04-06 | End: 2023-04-06

## 2023-04-06 RX ORDER — ACETAMINOPHEN 500 MG
975 TABLET ORAL ONCE
Refills: 0 | Status: COMPLETED | OUTPATIENT
Start: 2023-04-06 | End: 2023-04-06

## 2023-04-06 RX ADMIN — ONDANSETRON 4 MILLIGRAM(S): 8 TABLET, FILM COATED ORAL at 16:57

## 2023-04-06 RX ADMIN — OXYCODONE HYDROCHLORIDE 5 MILLIGRAM(S): 5 TABLET ORAL at 16:55

## 2023-04-06 RX ADMIN — Medication 975 MILLIGRAM(S): at 16:55

## 2023-04-06 NOTE — ED PROVIDER NOTE - NSFOLLOWUPINSTRUCTIONS_ED_ALL_ED_FT
You were seen in the Emergency department for ankle pain. Your discharge diagnosis is ankle sprain. No broken bones were seen on your X-rays.    1) Follow-up with your PCP in 2-3 days.    Follow-up with orthopedics in 7 days.  [address], [address] & [address] [place] [address]  Phone: [phone]     Bring all printed results to discuss with your provider.    2) Keep splint clean, dry, and intact until you are seen by orthopedics.    Rest the affected area as much as possible. Elevate the area, use ice to reduce swelling and pain.    3) Pain can be managed with Tylenol and Ibuprofen over the counter as directed.    Continue to take all other medications as directed.    4) Return to the emergency room immediately if your symptoms worsen or persist, or if you have a high or concerning fever, new or worsening pain in the affected area, numbness or tingling of the affected area, inability to move the affected area, increased swelling, redness of affected area, or if any other concerning or questionable symptoms.     Please read all attached patient information.

## 2023-04-06 NOTE — ED PROVIDER NOTE - PROGRESS NOTE DETAILS
Pt placed in posterior splint for suspect high ankle sprain. Patient provided with size appropriate crutches. Instructed/Demonstated proper use of crutches to patient. I watched as pt demonstrated proper use of crutches prior to discharge. images and plan d/w pt. all questions answered. pt ready and stable for DC. -Mark Denny PA-C

## 2023-04-06 NOTE — ED PROVIDER NOTE - OBJECTIVE STATEMENT
37-year-old female no significant past medical history presents to the ED complaining of left ankle pain after fall earlier today.  Patient reports she was running up the stairs fell to her left side and felt her left ankle invert, had immediate onset pain particularly in the dorsal and lateral aspects of the ankle and foot, has not been able to ambulate since without assistance.  Patient presented to the urgent care who referred her to the emergency department.  Patient reports she took Tylenol 1000 mg at approximately 10 AM this morning, and ibuprofen 600 mg at approximately 2 PM this afternoon..  Patient reports pain worse with palpation and any attempted movement, patient arrives in Ace bandage.  Patient denies numbness, paresthesias, weakness, other injury, head strike, LOC.

## 2023-04-06 NOTE — ED PROVIDER NOTE - CLINICAL SUMMARY MEDICAL DECISION MAKING FREE TEXT BOX
Nicol [age]-year-old female with no significant past medical history presenting to the ED with complaints of left ankle pain status post fall earlier today.  Patient reports she was running up the stairs when she fell to her left side and inverted her left ankle.  Had immediate onset of pain that dorsal lateral aspect of the ankle and foot.  Unable to bear weight or ambulate since.  Was seen in urgent care and referred to the ED for imaging.  Took Tylenol 1000 mg approximately 10 AM this morning ibuprofen in the mid afternoon approximately 2 PM.  Reports pain worse to palpation and movement.  Denies any numbness or tingling, additional injuries.  No head strike LOC or AC.    Uncomfortable appearing but no acute distress.  No midline cervical spinal tenderness.  Normocephalic atraumatic.  Normal respiratory effort.  Regular rate and rhythm.  2+ DP pulses bilaterally.  Swelling of the leg ankle over the lateral malleolus with tenderness.  Tenderness of the distal one third of the lower calf.  Anterior ankle and dorsal foot.  Concern for ankle fracture, high-grade foot sprain will obtain x-rays, analgesia, splint Ortho follow-up.

## 2023-04-06 NOTE — ED ADULT NURSE NOTE - OBJECTIVE STATEMENT
38 y/o female came to the ED after falling and no has left ankle pain, swelling noted, no deformities, positive pulses. Unable to bear weight on her left ankle.

## 2023-04-06 NOTE — ED PROVIDER NOTE - PHYSICAL EXAMINATION
GEN: Pt in NAD.  PSYCH: Affect appropriate.  EYES: Sclera white w/o injection.   ENT: Head NCAT.   RESP: No evidence of respiratory distress.  CARDIAC: RRR  VASC: 2+ dorsalis pedis pulses b/l.  MSK: +focal edema of L ankle dorsal aspect and lateral malleolus and of dorsal aspect of L foot. Otherwise no obvious joint deformity. +ttp of L distal 1/3 of lower leg, anterior ankle and lateral malleolus, and throughout the dorsal foot. No tenderness of metatarsal heads or toes. No bony tenderness of the remaining b/l LE. FROM b/l Knees, hips, and toes. FROM b/l UE without pain, no bony tenderness.  NEURO: Normal and equal sensation b/l LE, no focal motor deficit- exam limited due to pain.  SKIN: No rashes noted.

## 2023-04-06 NOTE — ED PROVIDER NOTE - ATTENDING APP SHARED VISIT CONTRIBUTION OF CARE
I, Tracy Camarena, performed a history and physical exam of the patient and discussed their management with the resident and /or advanced care provider. I reviewed the resident and /or ACP's note and agree with the documented findings and plan of care. I was present and available for all procedures.   see MDM

## 2023-04-06 NOTE — ED PROVIDER NOTE - PATIENT PORTAL LINK FT
You can access the FollowMyHealth Patient Portal offered by [facility] by registering at the following website: [email or web address]. By joining [employer]’s FollowMyHealth portal, you will also be able to view your health information using other applications (apps) compatible with our system.

## 2023-04-07 ENCOUNTER — APPOINTMENT (OUTPATIENT)
Dept: ORTHOPEDIC SURGERY | Facility: CLINIC | Age: 38
End: 2023-04-07

## 2023-04-11 ENCOUNTER — APPOINTMENT (OUTPATIENT)
Dept: ORTHOPEDIC SURGERY | Facility: CLINIC | Age: 38
End: 2023-04-11
Payer: COMMERCIAL

## 2023-04-11 DIAGNOSIS — S93.492A SPRAIN OF OTHER LIGAMENT OF LEFT ANKLE, INITIAL ENCOUNTER: ICD-10-CM

## 2023-04-11 PROCEDURE — 99203 OFFICE O/P NEW LOW 30 MIN: CPT

## 2023-04-11 NOTE — ASSESSMENT
[FreeTextEntry1] : 36 yo female presenting with left ATFL ankle sprain. X-rays reviewed from [facility] negative for fractures. Recommend non-surgical management. Patient's  present for today's exam\par -LLE WBAT in short cam boot/lace up ankle support brace, patient may ween off and d/c crutches once tolerable\par -Activities as tolerated, avoid strenuous/impact related activities\par -work note given today\par -Rest, ice, compression, elevation, NSAIDs PRN for pain. \par -All questions answered\par -F/u 6 weeks\par

## 2023-04-11 NOTE — PHYSICAL EXAM
[de-identified] : Examination of the left foot and ankle is as follows:\par INSPECTION: swelling, ecchymosis of foot and ankle, moderate swelling of dorsal foot and lateral ankle, but no abrasion, laceration, no erythema\par PALPATION: lateral ligament tenderness\par ROM: plantarflexion 5 degrees, inversion 5 degrees, eversion 5 degrees, dorsiflexion 5 degrees\par STRENGTH: dorsiflexion 4/5, plantar flexion 4/5, inversion 4/5, eversion 4/5, EHL 5/5, FHL 5/5\par VASCULAR: dorsalis pedis pulse: 2+, posterior tibialis pulse: 2+\par NEURO: Sensation present to light touch in all distributions\par GAIT: antalgic, LLE short leg splint, ambulation with crutches\par \par X-rays of the left ankle is as follows: outside x-rays reviewed on Ortho PACs\par Ankle 3 view: No fractures, subluxations or dislocations. No major abnormalities.

## 2023-04-11 NOTE — HISTORY OF PRESENT ILLNESS
[Sudden] : sudden [10] : 10 [8] : 8 [Burning] : burning [Dull/Aching] : dull/aching [Sharp] : sharp [Throbbing] : throbbing [Intermittent] : intermittent [Household chores] : household chores [Leisure] : leisure [Social interactions] : social interactions [Rest] : rest [Full time] : Work status: full time [de-identified] : Patient is here for her left ankle. Patient had x-ray at  urgent care and a splint put on, on 4/6/2023. Patient states she missed a step and rolled her ankle. Patient states she has sharp pain with weight bearing on the lateral aspect of her ankle.  [] : Post Surgical Visit: no [FreeTextEntry1] : Left ankle  [FreeTextEntry3] : 4/6/2023 [FreeTextEntry5] : Patient states she missed a step and rolled her ankle. 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

## 2023-04-14 ENCOUNTER — APPOINTMENT (OUTPATIENT)
Dept: ORTHOPEDIC SURGERY | Facility: CLINIC | Age: 38
End: 2023-04-14

## 2023-05-03 NOTE — PROGRESS NOTE ADULT - ASSESSMENT
- Continue with po analgesia  - Increase ambulation  - Continue regular diet  - d/c IV fluids  - Check CBC  - D/c Simpson  - Incision dressing removed
A/P: 30yo  POD#3 s/p pLTCS.  - Continue motrin, tylenol, oxycodone PRN for pain control.  - Increase ambulation  - Continue regular diet  -Fe/Colace/Vit C TID for anemia  - Discharge planning    Opal Matamoros PGY-1
Statement Selected

## 2023-05-25 ENCOUNTER — APPOINTMENT (OUTPATIENT)
Dept: ORTHOPEDIC SURGERY | Facility: CLINIC | Age: 38
End: 2023-05-25
Payer: COMMERCIAL

## 2023-05-25 PROCEDURE — 99213 OFFICE O/P EST LOW 20 MIN: CPT

## 2023-05-25 NOTE — HISTORY OF PRESENT ILLNESS
[Sudden] : sudden [7] : 7 [3] : 3 [Burning] : burning [Dull/Aching] : dull/aching [Sharp] : sharp [Throbbing] : throbbing [Intermittent] : intermittent [Household chores] : household chores [Leisure] : leisure [Social interactions] : social interactions [Rest] : rest [Full time] : Work status: full time [de-identified] : Patient is here for her left ankle. Patient is being treated for Sprain of anterior talofibular ligament. Patient states she still has sharp, dull, stabbing and aching pain when walking in the cam boot.  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  She has not had PT for this condition. [] : Post Surgical Visit: no [FreeTextEntry1] : Left ankle  [FreeTextEntry3] : 4/6/2023 [FreeTextEntry5] : Patient states she missed a step and rolled her ankle. 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

## 2023-05-25 NOTE — ASSESSMENT
[FreeTextEntry1] : 38 yo female presenting with left ATFL ankle sprain and peroneal tendonitis. X-rays reviewed from [facility] negative for fractures.  Patient with persistent pain and symptoms for past 6 weeks without much relief with nsaids and modified activities.  Will order MRI to rule out persistent ligament tears or peroneal tendon tear.\par \par -continue LLE WBAT in short cam boot/lace up ankle support brace, \par -Activities as tolerated, avoid strenuous/impact related activities\par -Rest, ice, compression, elevation, NSAIDs PRN for pain. \par -All questions answered\par -F/u after MRI\par

## 2023-05-25 NOTE — PHYSICAL EXAM
[de-identified] : Examination of the left foot and ankle is as follows:\par INSPECTION: swelling, ecchymosis of foot and ankle, moderate swelling of dorsal foot and lateral ankle, but no abrasion, laceration, no erythema\par PALPATION: lateral ligament tenderness\par ROM: plantarflexion 5 degrees, inversion 5 degrees, eversion 5 degrees, dorsiflexion 5 degrees\par STRENGTH: dorsiflexion 4/5, plantar flexion 4/5, inversion 4/5, eversion 4/5, EHL 5/5, FHL 5/5\par VASCULAR: dorsalis pedis pulse: 2+, posterior tibialis pulse: 2+\par NEURO: Sensation present to light touch in all distributions\par GAIT: antalgic, LLE short leg splint, ambulation with crutches\par \par X-rays of the left ankle is as follows: outside x-rays reviewed on Ortho PACs\par Ankle 3 view: No fractures, subluxations or dislocations. No major abnormalities.
Declined

## 2023-06-18 ENCOUNTER — FORM ENCOUNTER (OUTPATIENT)
Age: 38
End: 2023-06-18

## 2023-06-19 ENCOUNTER — APPOINTMENT (OUTPATIENT)
Dept: MRI IMAGING | Facility: CLINIC | Age: 38
End: 2023-06-19
Payer: COMMERCIAL

## 2023-06-19 PROCEDURE — 73721 MRI JNT OF LWR EXTRE W/O DYE: CPT | Mod: LT

## 2023-06-27 ENCOUNTER — APPOINTMENT (OUTPATIENT)
Dept: ORTHOPEDIC SURGERY | Facility: CLINIC | Age: 38
End: 2023-06-27
Payer: COMMERCIAL

## 2023-06-27 PROCEDURE — 99214 OFFICE O/P EST MOD 30 MIN: CPT

## 2023-06-27 NOTE — DATA REVIEWED
[MRI] : MRI [Left] : left [Ankle] : ankle [Report was reviewed and noted in the chart] : The report was reviewed and noted in the chart [FreeTextEntry1] : 1. Partial tear of the syndesmosis of the tibiofibular ligaments, anterior talofibular ligament, and calcane\par ligament. Tibiotalar joint effusion.\par 2. Periosteal avulsion injury of the posterior lateral distal tibia with reactive marrow edema.\par 3. Posterior tibial tendinopathy with tenosynovitis.

## 2023-06-27 NOTE — HISTORY OF PRESENT ILLNESS
[Sudden] : sudden [7] : 7 [3] : 3 [Burning] : burning [Dull/Aching] : dull/aching [Sharp] : sharp [Throbbing] : throbbing [Intermittent] : intermittent [Household chores] : household chores [Leisure] : leisure [Social interactions] : social interactions [Rest] : rest [Full time] : Work status: full time [de-identified] : Patient is here for her left ankle MRI viewing. Patient had MRI done at [facility] on 6/19/2023.\par \par Impression: \par 1. Partial tear of the syndesmosis of the tibiofibular ligaments, anterior talofibular ligament, and calcaneofibular\par ligament. Tibiotalar joint effusion.\par 2. Periosteal avulsion injury of the posterior lateral distal tibia with reactive marrow edema.\par 3. Posterior tibial tendinopathy with tenosynovitis. [] : Post Surgical Visit: no [FreeTextEntry1] : Left ankle  [FreeTextEntry3] : 4/6/2023 [FreeTextEntry5] : Patient states she missed a step and rolled her ankle. 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

## 2023-06-27 NOTE — REASON FOR VISIT
Possible fibromyalgia versus long-haul COVID  Start gabapentin 100 mg nightly  The dose may be escalated to 300 mg as tolerated  No evidence of an autoimmune condition  LEATHA 1: 80 which is borderline positive  ESR is borderline positive  There is no evidence of lupus, scleroderma, dermatomyositis, polymyositis, mixed connective tissue disease  Follow-up with rheumatology as needed    
[FreeTextEntry2] : Follow up

## 2023-06-27 NOTE — PHYSICAL EXAM
[de-identified] : Examination of the left foot and ankle is as follows:\par INSPECTION: swelling, ecchymosis of foot and ankle, moderate swelling of dorsal foot and lateral ankle, but no abrasion, laceration, no erythema\par PALPATION: lateral ligament tenderness\par ROM: plantarflexion 5 degrees, inversion 5 degrees, eversion 5 degrees, dorsiflexion 5 degrees\par STRENGTH: dorsiflexion 4/5, plantar flexion 4/5, inversion 4/5, eversion 4/5, EHL 5/5, FHL 5/5\par VASCULAR: dorsalis pedis pulse: 2+, posterior tibialis pulse: 2+\par NEURO: Sensation present to light touch in all distributions\par GAIT: antalgic, LLE short leg splint, ambulation with crutches\par \par X-rays of the left ankle is as follows: outside x-rays reviewed on Ortho PACs\par Ankle 3 view: No fractures, subluxations or dislocations. No major abnormalities.

## 2023-06-27 NOTE — ASSESSMENT
[FreeTextEntry1] : 38 yo female presenting with left ATFL ankle sprain and peroneal tendonitis. X-rays reviewed from [facility] negative for fractures.  Patient with persistent pain and symptoms for past 6 weeks without much relief with nsaids and modified activities.  Will order MRI to rule out persistent ligament tears or peroneal tendon tear.\par \par -continue LLE WBAT \par -Patient will begin PT at this time, Rx provided \par -Activities as tolerated, avoid strenuous/impact related activities\par -Rest, ice, compression, elevation, NSAIDs PRN for pain. \par -All questions answered\par -F/u 4 weeks\par \par Entered by Ger Hartman acting as scribe.\par Dr. Willoughby Attestation\par The documentation recorded by the scribe, in my presence, accurately reflects the service I, Dr. Willoughby, personally performed, and the decisions made by me with my edits as appropriate. \par

## 2023-06-29 NOTE — PATIENT PROFILE OB - TRANSPORTATION AVAILABLE, PROFILE
Pt presents to the ED from  c/o RLE pain and swelling. Pt notes having lower leg pain upon returning from [place] on 5/11/23. Today, pt developed RLE swelling. Pain rated 8/10. No recent surgeries. Pt does not smoke and is not on hormone replacements. No h/o blood clots in pt or immediate family. No chest pain, SOB, leg redness, leg wounds, or fever. Pt ambulatory with a steady gait.   family or friend will provide

## 2023-07-25 ENCOUNTER — APPOINTMENT (OUTPATIENT)
Dept: ORTHOPEDIC SURGERY | Facility: CLINIC | Age: 38
End: 2023-07-25
Payer: COMMERCIAL

## 2023-07-25 DIAGNOSIS — S93.492D SPRAIN OF OTHER LIGAMENT OF LEFT ANKLE, SUBSEQUENT ENCOUNTER: ICD-10-CM

## 2023-07-25 DIAGNOSIS — M76.72 PERONEAL TENDINITIS, LEFT LEG: ICD-10-CM

## 2023-07-25 PROCEDURE — 99213 OFFICE O/P EST LOW 20 MIN: CPT

## 2023-07-25 NOTE — PHYSICAL EXAM
[de-identified] : Examination of the left foot and ankle is as follows:\par INSPECTION: swelling, ecchymosis of foot and ankle, moderate swelling of dorsal foot and lateral ankle, but no abrasion, laceration, no erythema\par PALPATION: lateral ligament tenderness\par ROM: plantarflexion 5 degrees, inversion 5 degrees, eversion 5 degrees, dorsiflexion 5 degrees\par STRENGTH: dorsiflexion 4/5, plantar flexion 4/5, inversion 4/5, eversion 4/5, EHL 5/5, FHL 5/5\par VASCULAR: dorsalis pedis pulse: 2+, posterior tibialis pulse: 2+\par NEURO: Sensation present to light touch in all distributions\par GAIT: antalgic, LLE short leg splint, ambulation with crutches\par \par X-rays of the left ankle is as follows: outside x-rays reviewed on Ortho PACs\par Ankle 3 view: No fractures, subluxations or dislocations. No major abnormalities.

## 2023-07-25 NOTE — ASSESSMENT
[FreeTextEntry1] : 36 yo female presenting with left ATFL ankle sprain and peroneal tendonitis.  Improving with PT.  Continue nonsurgical management.\par \par \par -Activities as tolerated\par -Rest, ice, compression, elevation, NSAIDs PRN for pain. \par -All questions answered\par -F/u prn\par \par

## 2023-07-25 NOTE — HISTORY OF PRESENT ILLNESS
[Sudden] : sudden [7] : 7 [6] : 6 [Burning] : burning [Dull/Aching] : dull/aching [Sharp] : sharp [Throbbing] : throbbing [Intermittent] : intermittent [Household chores] : household chores [Leisure] : leisure [Social interactions] : social interactions [Rest] : rest [Full time] : Work status: full time [de-identified] : Patient is here for her left ankle. Patient is being treated for Peroneal tendinitis of left lower extremity and Sprain of anterior talofibular ligament. Patient states she has been going to PT 3x so far. Patient states her pain has gotten better but, she still has sharp pain on the medial aspect of her ankle and occasional shooting pain since starting PT.  [] : Post Surgical Visit: no [FreeTextEntry1] : Left ankle  [FreeTextEntry3] : 4/6/2023 [FreeTextEntry5] : Patient states she missed a step and rolled her ankle. 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

## 2023-08-01 ENCOUNTER — APPOINTMENT (OUTPATIENT)
Dept: ORTHOPEDIC SURGERY | Facility: CLINIC | Age: 38
End: 2023-08-01

## 2023-10-18 ENCOUNTER — RX RENEWAL (OUTPATIENT)
Age: 38
End: 2023-10-18

## 2023-10-18 RX ORDER — CHOLESTYRAMINE 4 G/9G
4 POWDER, FOR SUSPENSION ORAL TWICE DAILY
Qty: 180 | Refills: 3 | Status: ACTIVE | COMMUNITY
Start: 2022-07-01 | End: 1900-01-01

## 2023-11-09 ENCOUNTER — NON-APPOINTMENT (OUTPATIENT)
Age: 38
End: 2023-11-09

## 2024-01-03 ENCOUNTER — EMERGENCY (EMERGENCY)
Facility: HOSPITAL | Age: 39
LOS: 1 days | Discharge: HOME | End: 2024-01-03
Attending: EMERGENCY MEDICINE
Payer: COMMERCIAL

## 2024-01-03 VITALS
RESPIRATION RATE: 16 BRPM | TEMPERATURE: 98 F | DIASTOLIC BLOOD PRESSURE: 71 MMHG | HEART RATE: 95 BPM | SYSTOLIC BLOOD PRESSURE: 112 MMHG | OXYGEN SATURATION: 100 %

## 2024-01-03 VITALS
HEART RATE: 101 BPM | WEIGHT: 199.96 LBS | SYSTOLIC BLOOD PRESSURE: 115 MMHG | OXYGEN SATURATION: 99 % | HEIGHT: 63 IN | DIASTOLIC BLOOD PRESSURE: 90 MMHG | TEMPERATURE: 98 F | RESPIRATION RATE: 17 BRPM

## 2024-01-03 DIAGNOSIS — K08.499 PARTIAL LOSS OF TEETH DUE TO OTHER SPECIFIED CAUSE, UNSPECIFIED CLASS: Chronic | ICD-10-CM

## 2024-01-03 DIAGNOSIS — Z98.891 HISTORY OF UTERINE SCAR FROM PREVIOUS SURGERY: Chronic | ICD-10-CM

## 2024-01-03 DIAGNOSIS — Z90.49 ACQUIRED ABSENCE OF OTHER SPECIFIED PARTS OF DIGESTIVE TRACT: Chronic | ICD-10-CM

## 2024-01-03 PROCEDURE — 70450 CT HEAD/BRAIN W/O DYE: CPT | Mod: 26,MA

## 2024-01-03 PROCEDURE — 73110 X-RAY EXAM OF WRIST: CPT | Mod: 26,RT

## 2024-01-03 PROCEDURE — 73130 X-RAY EXAM OF HAND: CPT | Mod: 26,RT

## 2024-01-03 PROCEDURE — 29125 APPL SHORT ARM SPLINT STATIC: CPT | Mod: RT

## 2024-01-03 PROCEDURE — 73130 X-RAY EXAM OF HAND: CPT

## 2024-01-03 PROCEDURE — 72125 CT NECK SPINE W/O DYE: CPT | Mod: MA

## 2024-01-03 PROCEDURE — 99284 EMERGENCY DEPT VISIT MOD MDM: CPT | Mod: 25

## 2024-01-03 PROCEDURE — 73110 X-RAY EXAM OF WRIST: CPT

## 2024-01-03 PROCEDURE — 70450 CT HEAD/BRAIN W/O DYE: CPT | Mod: MA

## 2024-01-03 PROCEDURE — 72125 CT NECK SPINE W/O DYE: CPT | Mod: 26,MA

## 2024-01-03 RX ORDER — LIDOCAINE 4 G/100G
1 CREAM TOPICAL ONCE
Refills: 0 | Status: COMPLETED | OUTPATIENT
Start: 2024-01-03 | End: 2024-01-03

## 2024-01-03 RX ORDER — IBUPROFEN 200 MG
600 TABLET ORAL ONCE
Refills: 0 | Status: COMPLETED | OUTPATIENT
Start: 2024-01-03 | End: 2024-01-03

## 2024-01-03 RX ADMIN — Medication 600 MILLIGRAM(S): at 18:08

## 2024-01-03 RX ADMIN — LIDOCAINE 1 PATCH: 4 CREAM TOPICAL at 14:09

## 2024-01-03 NOTE — ED PROVIDER NOTE - OBJECTIVE STATEMENT
38-year-old female no significant pertinent medical history presenting to emergency department for fall from standing height with associated headache and fatigue.  Patient was in normal state of health prior to around 600–630 this morning when she was walking and went to step through a doorway over the saddle of the doorway and stepped onto a laminated plastic mat which is on a hard surface and slid out from under her.  Patient reports she fell backwards from standing height landed directly on her posterior occiput.  Was immediately alert and oriented per her .  Patient remembers the incidents leading up to the event.  Patient denies loss of consciousness.  Was immediately ambulatory and weightbearing as if she currently.  No vomiting episodes endorsed.  Patient denies anticoagulation use.  Also reports she landed on her outstretched right hand is having some mild right wrist pain.  Patient denies any other musculoskeletal injuries.  Denies chest pain, shortness of breath, fever/chills, abdominal pain, nausea/vomiting/diarrhea.  Denies any bruising behind the ears or under the eyes.  No leakage of fluids from her ears or nose.  Reportedly took 650 mg x 3 Tylenol right after the incident and another 650 mg of Tylenol around 1130 this morning.  Reports full range of motion her neck.

## 2024-01-03 NOTE — ED PROCEDURE NOTE - PROCEDURE SUPERVISED BY
Yanely from the pharmacy called.    She states in order for patient to receive her glucose meter and test strips the Medicare forms needs to be completed.     Please submit forms or if you have question(s) contact the pharmacy.      Hakan Li MD

## 2024-01-03 NOTE — ED PROCEDURE NOTE - ATTENDING CONTRIBUTION TO CARE
I, EM Attending, Hakan Li was present for and supervised the critical portions of the procedure performed by the Resident Physician or BERTHA.

## 2024-01-03 NOTE — ED PROVIDER NOTE - NSFOLLOWUPCLINICSTOKEN_GEN_ALL_ED_FT
621115: || ||00\01||False;870907: || ||00\01||False;[id number]: || ||00\01||False; [id number]: || ||00\01||False;[id number]: || ||00\01||False;[id number]: || ||00\01||False;

## 2024-01-03 NOTE — ED PROVIDER NOTE - NSFOLLOWUPCLINICS_GEN_ALL_ED_FT
[facility]  Orthopedic Surgery  [address], [address] & 4th [place] [address]  Phone: [phone]  Fax:     [facility]  Orthopedic Surgery  [address] [address]  Phone: [phone]  Fax:     [facility]  Orthopedic Surgery  [address] [place] [address]  Phone: [phone]  Fax:      [facility] Orthopedic Surgery  Orthopedic Surgery  [address], [address]rd & 4th [place] [address]  Phone: [phone]  Fax:     [facility]  Orthopedic Surgery  [address] [address]  Phone: [phone]  Fax:     [facility]  Orthopedic Surgery  [address] [place] [address]  Phone: [phone]  Fax:

## 2024-01-03 NOTE — ED PROCEDURE NOTE - CPROC ED TIME OUT STATEMENT1
“Patient's name, , procedure and correct site were confirmed during the [place] Timeout.” “Patient's name, , procedure and correct site were confirmed during the [place] Timeout.”

## 2024-01-03 NOTE — ED ADULT NURSE NOTE - OBJECTIVE STATEMENT
1322 38 yr old BF c/o severe Head pain and loss of balance.s/p tripped and fell at 6AM this morning striking back of head on metal door frame. No LOC. Denies dizziness, N/V. States feels off balance when walking. A&Ox4. looks drowsy. PERRL Skin W&D. lips and nailbeds pink 1322 38 yr old BF c/o severe Head pain and loss of balance.s/p tripped over a toy and fell at 6AM this morning striking back of head on metal door frame. No LOC. Denies dizziness, N/V. States feels off balance when walking. A&Ox4. looks drowsy. PERRL Skin W&D. lips and nailbeds pink

## 2024-01-03 NOTE — ED PROVIDER NOTE - ATTENDING CONTRIBUTION TO CARE
Hakan Li MD:  I personally made/approve the management plan and take responsibility for the patient management.    MDM: 38-year-old female who is otherwise healthy who presents for headache and fatigue and right wrist pain after fall from standing height around 6 AM and hit the back of her head.  Denies being on antiplatelets or anticoagulants or personal or family history of bleeding disorders.    ROS: denies LOC, syncope, chest pain, shortness of breath, abdominal pain, back pain, numbness, weakness, vomiting, lightheadedness, vision changes, difficulty walking.    Primary Survey: airway intact, breathing with symmetrical bilateral lung sounds, circulation with pulses in all 4 extremities.  Secondary Survey: NAD, NCAT, GCS [15], PERRL, EOMI without diplopia or discomfort, trachea midline, no stridor, CTAB, NRRR. No chest wall tenderness, deformity or crepitus. No abdominal tenderness or guarding. No signs of external trauma to chest and abdomen, no ecchymosis. No tenderness or deformity to head, maxillo-facial, or midline of thoracic/lumbar vertebrae.  (+) Tenderness over the midline of the cervical spine and bilateral paraspinal muscles, as well as positive tenderness over the distal radius and snuffbox and first carpal metacarpal joint.  Otherwise no tenderness, deformity or reduced ROM to all other joints of all four extremities. Pelvis stable. Extremities neurovascularly intact with soft compartments. No focal sensory or motor deficits.    Will obtain CT Head to evaluate for acute intracranial pathology.  Will obtain CT C-spine to evaluate for acute traumatic cervical spine injury.  Will obtain x-ray of the right wrist and hand.  Pain control and reassess.    My independent interpretation of the right hand and wrist x-ray images shows no acute fracture or dislocation.   More details to be seen in the official radiologist read.    Due to tenderness over snuffbox and concern for occult scaphoid fracture, patient was placed in thumb spica.  See procedure note.  Reassess afterwards and neurovascular intact distally. Hakan Li MD:  I personally made/approve the management plan and take responsibility for the patient management.    MDM: 38-year-old female who is otherwise healthy who presents for headache and fatigue and right wrist pain after fall from standing height around 6 AM and hit the back of her head.  Denies being on antiplatelets or anticoagulants or personal or family history of bleeding disorders.    ROS: denies LOC, syncope, chest pain, shortness of breath, abdominal pain, back pain, numbness, weakness, vomiting, lightheadedness, vision changes, difficulty walking.    Primary Survey: airway intact, breathing with symmetrical bilateral lung sounds, circulation with pulses in all 4 extremities.  Secondary Survey: NAD, NCAT, GCS [15], PERRL, EOMI without diplopia or discomfort, trachea midline, no stridor, CTAB, NRRR. No chest wall tenderness, deformity or crepitus. No abdominal tenderness or guarding. No signs of external trauma to chest and abdomen, no ecchymosis. No tenderness or deformity to head, maxillo-facial, or midline of thoracic/lumbar vertebrae.  (+) Tenderness over the midline of the cervical spine and bilateral paraspinal muscles, as well as positive tenderness over the distal radius and snuffbox and first carpal metacarpal joint.  Otherwise no tenderness, deformity or reduced ROM to all other joints of all four extremities. Pelvis stable. Extremities neurovascularly intact with soft compartments. No focal sensory or motor deficits.    Will obtain CT Head to evaluate for acute intracranial pathology.  Will obtain CT C-spine to evaluate for acute traumatic cervical spine injury.  Will obtain x-ray of the right wrist and hand.  Pain control and reassess.    My independent interpretation of the right hand and wrist x-ray images shows no acute fracture or dislocation.   More details to be seen in the official radiologist read.    Due to tenderness over snuffbox and concern for occult scaphoid fracture, patient was placed in thumb spica.  See procedure note.  Reassess afterwards and neurovascular intact distally.    Signed out at 1600 pending imaging and close reassessments for further treatment and disposition decisions.

## 2024-01-03 NOTE — ED PROVIDER NOTE - NSFOLLOWUPINSTRUCTIONS_ED_ALL_ED_FT
You were seen in the emergency department after a fall that occurred from slipping on a laminate unit.  You presented with headache and neck tenderness.  Your clinical symptoms are consistent with a concussion and also known clinically is a mild traumatic brain injury.  Your CAT scans of your head and neck were negative for any acute findings.     Your wrist also demonstrated tenderness on our exam over the area called the scaphoid bone. A thumb spica splint was placed. Please keep this splint dry and follow up with an orthopedic surgeon within 3-5 days. Our clinical numbers are attached below.     You were treated for pain with Motrin and a lidocaine patch.    Please refer to the below discharge instructions with regards to the concussion care.  Follow-up with our headache center if you continue to have persistent neurological symptoms including confusion or fatigue beyond 1 week. The phone number for our center is [phone].    Follow up with your primary physician in 1-2 days. If needed call [phone] to find a primary care physician or call  [phone] to schedule an appointment with the general medicine.    You may take 500-1000 mg acetaminophen every 6 hours, as needed for pain.  You may take 600 mg ibuprofen every 8 hours, with food, as needed for pain.    1. TAKE ALL MEDICATIONS AS DIRECTED.    2. FOR PAIN OR FEVER YOU CAN TAKE IBUPROFEN (MOTRIN, ADVIL) OR ACETAMINOPHEN (TYLENOL) AS NEEDED, AS DIRECTED ON PACKAGING.  3. FOLLOW UP WITH YOUR PRIMARY DOCTOR WITHIN 5 DAYS AS DIRECTED.  4. IF YOU HAD LABS OR IMAGING DONE, YOU WERE GIVEN COPIES OF ALL LABS AND/OR IMAGING RESULTS FROM YOUR ER VISIT--PLEASE TAKE THEM WITH YOU TO YOUR FOLLOW UP APPOINTMENTS.  5. RETURN TO THE ER FOR ANY WORSENING SYMPTOMS OR CONCERNS.  ----------------------------------------------------------------------------------------------------------------  Concussion  A concussion is a brain injury from a direct hit (blow) to the head or body. This blow causes the brain to shake quickly back and forth inside the skull. This can damage brain cells and cause chemical changes in the brain. A concussion may also be known as a mild traumatic brain injury (TBI).    Concussions are usually not life-threatening, but the effects of a concussion can be serious. If your child has a concussion, he or she is more likely to experience concussion-like symptoms after a direct blow to the head in the future.    What are the causes?  This condition is caused by:  A direct blow to the head, such as from running into another player during a game, being hit in a fight, or falling and hitting the head on a hard surface.  A jolt of the head or neck that causes the brain to move back and forth inside the skull, such as in a car crash.    What are the signs or symptoms?  The signs of a concussion can be hard to notice. Early on, they may be missed by you, family members, and health care providers. Your child may look fine but act or seem different.    Symptoms are usually temporary, but they may last for days, weeks, or even longer. Some symptoms may appear right away but other symptoms may not show up for hours or days. Every head injury is different.   Symptoms may include:  Headaches. This can include a feeling of pressure in the head.  Memory problems.  Trouble concentrating, organizing, or making decisions.  Slowness in thinking, acting, speaking, or reading.  Confusion.  Fatigue.  Changes in eating or sleeping patterns.  Problems with coordination or balance.  Nausea or vomiting.  Numbness or tingling.  Sensitivity to light or noise.  Vision or hearing problems.  Reduced sense of smell.  Irritability or mood changes.  Dizziness.  Lack of motivation.  Seeing or hearing things that other people do not see or hear (hallucinations).    How is this diagnosed?  This condition is diagnosed based on:  Your symptoms. A description of your injury.    How is this treated?  This condition is treated with physical and mental rest and careful observation, usually at home. If the concussion is severe, you may need to stay home from school for a while.  You may be referred to a concussion clinic or to other health care providers for management.  It is important to tell your health care provider if you are taking any medicines, including prescription medicines, over-the-counter medicines, and natural remedies. Some medicines, such as blood thinners (anticoagulants) and aspirin, may increase the chance of complications, such as bleeding.  How fast you will recover from a concussion depends on many factors, such as how severe the concussion is, what part of the brain was injured, how old your child is, and how healthy you are before the concussion.  Recovery can take time. It is important for you to wait to return to activity until a health care provider says it is safe to do that and your symptoms are completely gone.  Follow these instructions at home:  Limit your activities that require a lot of thought or focused attention, such as:  Watching TV.  Playing memory games and puzzles.  Doing homework.  Working on the computer.    Rest. Rest helps the brain to heal. Make sure you:  Gets plenty of sleep at night. Avoid stay up late at night.  Keeps the same bedtime hours on weekends and weekdays.  Rests during the day. Take naps or rest breaks when you tired.    Having another concussion before the first one has healed can be dangerous. Keep away from high-risk activities that could cause a second concussion, such as:  Riding a bicycle.  Playing sports.  Participating in gym class or recess activities.  Climbing on playground equipment.    Ask your health care provider when it is safe for you to return to regular activities.   General instructions   Give over-the-counter and prescription medicines only as told by you health care provider.  Keep all follow-up visits as told by your health care provider. This is important.    Avoid injuries by :  Wear a seat belt when riding in a car.  Wear a helmet when biking, skiing, skateboarding, skating, or doing similar activities.  Avoid activities that could lead to a second concussion, such as contact sports or recreational sports, until your health care provider says it is okay.    You can also take safety measures in your home, such as:  Removing clutter and tripping hazards from floors and stairways.  use grab bars in bathrooms and handrails by stairs.  Placing non-slip mats on floors and in bathtubs.  Improving lighting in dim areas.    Contact a health care provider if:  Your symptoms get worse.  You develop new symptoms.  You continue to have symptoms for more than 2 weeks.    Get help right away if:  You have slurred speech.  You have a seizure or convulsions.  You have worsening headaches.  Your fatigue, confusion, or irritability gets worse.  You keeps vomiting.  Your coordination gets worse.    Summary  A concussion is a brain injury from a direct hit (blow) to the head or body.  A concussion may also be called a mild traumatic brain injury (TBI).  This condition is treated with physical and mental rest and careful observation.  Ask your health care provider when it is safe for you to return to his or her regular activities. Follow safety instructions as told by his or her health care provider.    This information is not intended to replace advice given to you by your health care provider. Make sure you discuss any questions you have with your health care provider.   ----------------------------------------------------------------------------------------------------------------   Cast or Splint Care, Adult    Casts and splints are supports that are worn to protect broken bones and other injuries. A cast or splint may hold a bone still and in the correct position while it heals. Casts and splints may also help with pain, swelling, and muscle spasms.    A cast is a hardened support that is usually made of fiberglass or plaster. It is custom-fit to the body and offers more protection than a splint. Most casts cannot be taken off and put back on. A splint is a type of soft support that is usually made from cloth and elastic. It can be adjusted or taken off as needed. Often, when a bone is broken, a splint is put on until the swelling goes down, and then the splint is replaced by a cast.    You may need a cast or a splint if you:  Have a broken bone.  Have a soft-tissue injury.  Need to keep an injured body part from moving (keep it immobile) after surgery.    What are the risks?  In some cases, wearing a cast or splint can cause a reduced blood supply to the wrist or hand or to the foot and toes. This can happen if there is a lot of swelling or if the cast or splint is too tight. Limited blood supply results in a condition called compartment syndrome and can cause permanent damage.     Symptoms include:  Pain that is getting worse.  Numbness and tingling.  Changes in skin color, including paleness or a bluish color.  Cold fingers or toes.  Other complications of wearing a cast or splint can include:  Skin irritation that can cause itching, rash, skin sores, or skin infection.  Limb stiffness or weakness.    How to care for a nonremovable cast or splint  A person checking the skin around a cast on his foot and lower leg.  Do not put pressure on any part of the cast or splint until it is fully hardened. This may take several hours.  Check the skin around the cast or splint every day. Tell your health care provider about any concerns.  Do not stick anything inside the cast or splint to scratch your skin. Doing that increases your risk of infection.  You may put lotion on dry skin around the edges of the cast or splint. Do not put lotion on the skin underneath the cast or splint.  Keep the cast or splint clean and dry.    Follow these instructions at home:    Bathing    Do not take baths, swim, or use a hot tub until your health care provider approves. Ask your health care provider if you may take showers. You may only be allowed to take sponge baths.  If your cast or splint is not waterproof:  Do not let it get wet.  Cover it with a watertight covering when you take a bath or shower.    Managing pain, stiffness, and swelling    If directed, put ice on the affected area. To do this:  If you have a removable cast or splint, remove it as told by your health care provider.  Put ice in a plastic bag.  Place a towel between your skin and the bag or between your cast and the bag.  Leave the ice on for 20 minutes, 2–3 times a day.  Remove the ice if your skin turns bright red. This is very important. If you cannot feel pain, heat, or cold, you have a greater risk of damage to the area.  Move your fingers or toes often to reduce stiffness and swelling.  Raise (elevate) the injured area above the level of your heart while you are sitting or lying down.  Safety    Do not use the injured limb to support your body weight until your health care provider says that you can. Use crutches or other assistive devices as told by your health care provider.  Ask your health care provider when it is safe to drive if you have a cast or splint on part of your body.  General instructions    Take over-the-counter and prescription medicines only as told by your health care provider.  Return to your normal activities as told by your health care provider. Ask your health care provider what activities are safe for you.  Keep all follow-up visits. This is important.    Contact a health care provider if:    The skin around the cast or splint gets red or raw.  The skin under the cast is extremely itchy or painful.    Your cast or splint:  Gets damaged.  Feels very uncomfortable.  Is too tight or too loose.  Your cast becomes wet or develops a soft spot or area.  You notice a bad smell coming from under your cast.  You get an object stuck under your cast.  There is fluid leaking through the cast.    Get help right away if:  You develop any symptoms of compartment syndrome, such as:  Severe pain or pressure under the cast.  Numbness, tingling, coldness, or pale or bluish skin.  The part of your body above or below the cast is swollen and discolored.  You cannot feel or move your fingers or toes.  You have trouble breathing or shortness of breath.  You have chest pain.  Your pain gets worse.  These symptoms may represent a serious problem that is an emergency. Do not wait to see if the symptoms will go away. Get medical help right away. Call your local emergency services (911 in the [place] Do not drive yourself to the hospital.    Summary  Casts and splints are worn to protect broken bones and other injuries.  Casts and splints should remain clean and dry.  Remove your cast or splint only as told by your health care provider.  Get help right away if your pain gets worse, or if you have numbness, tingling, or skin that turns cold, blue, or discolored. You were seen in the emergency department after a fall that occurred from slipping on a laminate unit.  You presented with headache and neck tenderness.  Your clinical symptoms are consistent with a concussion and also known clinically is a mild traumatic brain injury.  Your CAT scans of your head and neck were negative for any acute findings.     Your wrist also demonstrated tenderness on our exam over the area called the scaphoid bone. A thumb spica splint was placed. Please keep this splint dry and follow up with an orthopedic surgeon within 3-5 days. Our clinical numbers are attached below.     You were treated for pain with Motrin and a lidocaine patch.    Please refer to the below discharge instructions with regards to the concussion care.  Follow-up with our headache center if you continue to have persistent neurological symptoms including confusion or fatigue beyond 1 week. The phone number for our center is [phone].    Follow up with your primary physician in 1-2 days. If needed call [phone] to find a primary care physician or call  [phone] to schedule an appointment with the general medicine.    You may take 500-1000 mg acetaminophen every 6 hours, as needed for pain.  You may take 600 mg ibuprofen every 8 hours, with food, as needed for pain.    1. TAKE ALL MEDICATIONS AS DIRECTED.    2. FOR PAIN OR FEVER YOU CAN TAKE IBUPROFEN (MOTRIN, ADVIL) OR ACETAMINOPHEN (TYLENOL) AS NEEDED, AS DIRECTED ON PACKAGING.  3. FOLLOW UP WITH YOUR PRIMARY DOCTOR WITHIN 5 DAYS AS DIRECTED.  4. IF YOU HAD LABS OR IMAGING DONE, YOU WERE GIVEN COPIES OF ALL LABS AND/OR IMAGING RESULTS FROM YOUR ER VISIT--PLEASE TAKE THEM WITH YOU TO YOUR FOLLOW UP APPOINTMENTS.  5. RETURN TO THE ER FOR ANY WORSENING SYMPTOMS OR CONCERNS.  ----------------------------------------------------------------------------------------------------------------  Concussion  A concussion is a brain injury from a direct hit (blow) to the head or body. This blow causes the brain to shake quickly back and forth inside the skull. This can damage brain cells and cause chemical changes in the brain. A concussion may also be known as a mild traumatic brain injury (TBI).    Concussions are usually not life-threatening, but the effects of a concussion can be serious. If your child has a concussion, he or she is more likely to experience concussion-like symptoms after a direct blow to the head in the future.    What are the causes?  This condition is caused by:  A direct blow to the head, such as from running into another player during a game, being hit in a fight, or falling and hitting the head on a hard surface.  A jolt of the head or neck that causes the brain to move back and forth inside the skull, such as in a car crash.    What are the signs or symptoms?  The signs of a concussion can be hard to notice. Early on, they may be missed by you, family members, and health care providers. Your child may look fine but act or seem different.    Symptoms are usually temporary, but they may last for days, weeks, or even longer. Some symptoms may appear right away but other symptoms may not show up for hours or days. Every head injury is different.   Symptoms may include:  Headaches. This can include a feeling of pressure in the head.  Memory problems.  Trouble concentrating, organizing, or making decisions.  Slowness in thinking, acting, speaking, or reading.  Confusion.  Fatigue.  Changes in eating or sleeping patterns.  Problems with coordination or balance.  Nausea or vomiting.  Numbness or tingling.  Sensitivity to light or noise.  Vision or hearing problems.  Reduced sense of smell.  Irritability or mood changes.  Dizziness.  Lack of motivation.  Seeing or hearing things that other people do not see or hear (hallucinations).    How is this diagnosed?  This condition is diagnosed based on:  Your symptoms. A description of your injury.    How is this treated?  This condition is treated with physical and mental rest and careful observation, usually at home. If the concussion is severe, you may need to stay home from school for a while.  You may be referred to a concussion clinic or to other health care providers for management.  It is important to tell your health care provider if you are taking any medicines, including prescription medicines, over-the-counter medicines, and natural remedies. Some medicines, such as blood thinners (anticoagulants) and aspirin, may increase the chance of complications, such as bleeding.  How fast you will recover from a concussion depends on many factors, such as how severe the concussion is, what part of the brain was injured, how old your child is, and how healthy you are before the concussion.  Recovery can take time. It is important for you to wait to return to activity until a health care provider says it is safe to do that and your symptoms are completely gone.  Follow these instructions at home:  Limit your activities that require a lot of thought or focused attention, such as:  Watching TV.  Playing memory games and puzzles.  Doing homework.  Working on the computer.    Rest. Rest helps the brain to heal. Make sure you:  Gets plenty of sleep at night. Avoid stay up late at night.  Keeps the same bedtime hours on weekends and weekdays.  Rests during the day. Take naps or rest breaks when you tired.    Having another concussion before the first one has healed can be dangerous. Keep away from high-risk activities that could cause a second concussion, such as:  Riding a bicycle.  Playing sports.  Participating in gym class or recess activities.  Climbing on playground equipment.    Ask your health care provider when it is safe for you to return to regular activities.   General instructions   Give over-the-counter and prescription medicines only as told by you health care provider.  Keep all follow-up visits as told by your health care provider. This is important.    Avoid injuries by :  Wear a seat belt when riding in a car.  Wear a helmet when biking, skiing, skateboarding, skating, or doing similar activities.  Avoid activities that could lead to a second concussion, such as contact sports or recreational sports, until your health care provider says it is okay.    You can also take safety measures in your home, such as:  Removing clutter and tripping hazards from floors and stairways.  use grab bars in bathrooms and handrails by stairs.  Placing non-slip mats on floors and in bathtubs.  Improving lighting in dim areas.    Contact a health care provider if:  Your symptoms get worse.  You develop new symptoms.  You continue to have symptoms for more than 2 weeks.    Get help right away if:  You have slurred speech.  You have a seizure or convulsions.  You have worsening headaches.  Your fatigue, confusion, or irritability gets worse.  You keeps vomiting.  Your coordination gets worse.    Summary  A concussion is a brain injury from a direct hit (blow) to the head or body.  A concussion may also be called a mild traumatic brain injury (TBI).  This condition is treated with physical and mental rest and careful observation.  Ask your health care provider when it is safe for you to return to his or her regular activities. Follow safety instructions as told by his or her health care provider.    This information is not intended to replace advice given to you by your health care provider. Make sure you discuss any questions you have with your health care provider.   ----------------------------------------------------------------------------------------------------------------   Cast or Splint Care, Adult    Casts and splints are supports that are worn to protect broken bones and other injuries. A cast or splint may hold a bone still and in the correct position while it heals. Casts and splints may also help with pain, swelling, and muscle spasms.    A cast is a hardened support that is usually made of fiberglass or plaster. It is custom-fit to the body and offers more protection than a splint. Most casts cannot be taken off and put back on. A splint is a type of soft support that is usually made from cloth and elastic. It can be adjusted or taken off as needed. Often, when a bone is broken, a splint is put on until the swelling goes down, and then the splint is replaced by a cast.    You may need a cast or a splint if you:  Have a broken bone.  Have a soft-tissue injury.  Need to keep an injured body part from moving (keep it immobile) after surgery.    What are the risks?  In some cases, wearing a cast or splint can cause a reduced blood supply to the wrist or hand or to the foot and toes. This can happen if there is a lot of swelling or if the cast or splint is too tight. Limited blood supply results in a condition called compartment syndrome and can cause permanent damage.     Symptoms include:  Pain that is getting worse.  Numbness and tingling.  Changes in skin color, including paleness or a bluish color.  Cold fingers or toes.  Other complications of wearing a cast or splint can include:  Skin irritation that can cause itching, rash, skin sores, or skin infection.  Limb stiffness or weakness.    How to care for a nonremovable cast or splint  A person checking the skin around a cast on his foot and lower leg.  Do not put pressure on any part of the cast or splint until it is fully hardened. This may take several hours.  Check the skin around the cast or splint every day. Tell your health care provider about any concerns.  Do not stick anything inside the cast or splint to scratch your skin. Doing that increases your risk of infection.  You may put lotion on dry skin around the edges of the cast or splint. Do not put lotion on the skin underneath the cast or splint.  Keep the cast or splint clean and dry.    Follow these instructions at home:    Bathing    Do not take baths, swim, or use a hot tub until your health care provider approves. Ask your health care provider if you may take showers. You may only be allowed to take sponge baths.  If your cast or splint is not waterproof:  Do not let it get wet.  Cover it with a watertight covering when you take a bath or shower.    Managing pain, stiffness, and swelling    If directed, put ice on the affected area. To do this:  If you have a removable cast or splint, remove it as told by your health care provider.  Put ice in a plastic bag.  Place a towel between your skin and the bag or between your cast and the bag.  Leave the ice on for 20 minutes, 2–3 times a day.  Remove the ice if your skin turns bright red. This is very important. If you cannot feel pain, heat, or cold, you have a greater risk of damage to the area.  Move your fingers or toes often to reduce stiffness and swelling.  Raise (elevate) the injured area above the level of your heart while you are sitting or lying down.  Safety    Do not use the injured limb to support your body weight until your health care provider says that you can. Use crutches or other assistive devices as told by your health care provider.  Ask your health care provider when it is safe to drive if you have a cast or splint on part of your body.  General instructions    Take over-the-counter and prescription medicines only as told by your health care provider.  Return to your normal activities as told by your health care provider. Ask your health care provider what activities are safe for you.  Keep all follow-up visits. This is important.    Contact a health care provider if:    The skin around the cast or splint gets red or raw.  The skin under the cast is extremely itchy or painful.    Your cast or splint:  Gets damaged.  Feels very uncomfortable.  Is too tight or too loose.  Your cast becomes wet or develops a soft spot or area.  You notice a bad smell coming from under your cast.  You get an object stuck under your cast.  There is fluid leaking through the cast.    Get help right away if:  You develop any symptoms of compartment syndrome, such as:  Severe pain or pressure under the cast.  Numbness, tingling, coldness, or pale or bluish skin.  The part of your body above or below the cast is swollen and discolored.  You cannot feel or move your fingers or toes.  You have trouble breathing or shortness of breath.  You have chest pain.  Your pain gets worse.  These symptoms may represent a serious problem that is an emergency. Do not wait to see if the symptoms will go away. Get medical help right away. Call your local emergency services (911 in the [place] Do not drive yourself to the hospital.    Summary  Casts and splints are worn to protect broken bones and other injuries.  Casts and splints should remain clean and dry.  Remove your cast or splint only as told by your health care provider.  Get help right away if your pain gets worse, or if you have numbness, tingling, or skin that turns cold, blue, or discolored.

## 2024-01-03 NOTE — ED ADULT TRIAGE NOTE - CHIEF COMPLAINT QUOTE
pt slipped ans fell backwards has a headache  Fell at home and went to work and came here for her headache

## 2024-01-03 NOTE — ED PROVIDER NOTE - ADDITIONAL NOTES AND INSTRUCTIONS:
Please excuse this patient from work until the listed date as she was seen in the ER for an urgent medical condition requiring rest and healing until listed. Upon return please afford breaks and accomodation as requested due to her medical condition.

## 2024-01-03 NOTE — ED PROVIDER NOTE - PHYSICAL EXAMINATION
GEN: Patient awake and alert. No acute distress, non-toxic. Well appearing but fatigued / tired. +photophobia.   Head: Normocephalic, atraumatic. No hematoma or lacerations.   Neck: +midline spinal TTP. Full ROM in vertical and horizontal axis. No step offs.   Eyes: PERRLA b/l. EOMI, no scleral icterus, no conjunctival injection. Moist mucous membranes.  ENT: No raccoon or ureña sign.   CARDIAC: RRR. Normal S1, S2. No murmur, rubs, or gallops. No peripheral edema noted.  PULM: Speaking in full sentences. CTA B/L no wheeze, rales or rhonchi. No signs of respiratory distress, no accessory muscle usage or nasal flaring. No anterior chest wall TTP.   ABD: Soft, nontender, nondistended. No rebound, no involuntary guarding.   MSK: Moving all extremities spontaneously. Full ROM in extremities. No obvious bony deformity. Full ROM in right wrist. Motor / sensory intact in radial, median and ulnar nerve distribution. +SNUFF BOX TTP on right hand.   NEURO: A&Ox3, no focal neurological deficits, CN 2-12 grossly intact. Following simple commands. FTN and HTS coordination intact. GCS 15.   SKIN: Small area of ecchymosis over right radiocarpal joint. Warm, dry, no rash, no lesions, no open wounds. No urticaria. No jaundice.

## 2024-01-03 NOTE — ED PROVIDER NOTE - CLINICAL SUMMARY MEDICAL DECISION MAKING FREE TEXT BOX
38-year-old female no significant medical history presenting emergency department status post mechanical fall from standing height endorsing fatigue and associated right wrist pain after FOOSH injury. Per [place] CT rules patient has midline tenderness to palpation screen with CT neck.  Although does not meet criteria for CT head given [place] CT rules given fatigue and already plan for scanning CT of your neck will get CT head Noncon.  Avoid NSAIDs until CT head result back.  Will treat plain lidocaine patch.  X-rays hand/wrist evaluate for carpal metacarpal injury in the setting of FOOSH mechanism concern for scaphoid fracture given snuffbox tenderness.  Patient has GCS 15, neuroexam is nonfocal and intact.  Afebrile without systemic signs infection.  Hemodynamically stable.  Dispo pending imaging and pain management 38-year-old female no significant medical history presenting emergency department status post mechanical fall from standing height endorsing fatigue and associated right wrist pain after FOOSH injury. Per [place] CT rules patient has midline tenderness to palpation screen with CT neck.  Although does not meet criteria for CT head given [place] CT rules given fatigue and already plan for scanning CT of your neck will get CT head Noncon.  Avoid NSAIDs until CT head result back.  Will treat plain lidocaine patch.  X-rays hand/wrist evaluate for carpal metacarpal injury in the setting of FOOSH mechanism concern for scaphoid fracture given snuffbox tenderness.  Patient has GCS 15, neuroexam is nonfocal and intact.  Afebrile without systemic signs infection.  Hemodynamically stable.  Dispo pending imaging and pain management

## 2024-01-03 NOTE — ED PROVIDER NOTE - IV ALTEPLASE EXCL REL HIDDEN
[Nutrition/ Exercise/ Weight Management] : nutrition, exercise, weight management [Vitamins/Supplements] : vitamins/supplements show

## 2024-01-03 NOTE — ED PROVIDER NOTE - PROGRESS NOTE DETAILS
Bari Ramachandran MD PGY1: Patient reassessed, stable. CT head and neck negative. Patient TBDC.  Lab and imaging results were discussed with the patient. Shared decision making was held between provider and patient with regards to disposition decision. All questions and concerns were addressed. Patient is agreeable to disposition plan.

## 2024-01-03 NOTE — ED PROVIDER NOTE - PATIENT PORTAL LINK FT
You can access the FollowMyHealth Patient Portal offered by [facility] by registering at the following website: [email or web address]. By joining [employer]’s FollowMyHealth portal, you will also be able to view your health information using other applications (apps) compatible with our system. You can access the FollowMyHealth Patient Portal offered by [facility] by registering at the following website: [email or web address]. By joining [employer]’s FollowMyHealth portal, you will also be able to view your health information using other applications (apps) compatible with our system.

## 2024-01-03 NOTE — ED ADULT NURSE NOTE - NSFALLHARMRISKINTERV_ED_ALL_ED
Communicate risk of Fall with Harm to all staff, patient, and family/Provide visual cue: red socks, yellow wristband, yellow gown, etc/Reinforce activity limits and safety measures with patient and family/Bed in lowest position, wheels locked, appropriate side rails in place/Call bell, personal items and telephone in reach/Instruct patient to call for assistance before getting out of bed/chair/stretcher/Non-slip footwear applied when patient is off stretcher/[place] to call system/Physically safe environment - no spills, clutter or unnecessary equipment/Purposeful Proactive Rounding/Room/bathroom lighting operational, light cord in reach Communicate risk of Fall with Harm to all staff, patient, and family/Provide visual cue: red socks, yellow wristband, yellow gown, etc/Reinforce activity limits and safety measures with patient and family/Bed in lowest position, wheels locked, appropriate side rails in place/Call bell, personal items and telephone in reach/Instruct patient to call for assistance before getting out of bed/chair/stretcher/Non-slip footwear applied when patient is off stretcher/[place] to call system/Physically safe environment - no spills, clutter or unnecessary equipment/Purposeful Proactive Rounding/Room/bathroom lighting operational, light cord in reach

## 2024-01-08 ENCOUNTER — APPOINTMENT (OUTPATIENT)
Dept: ORTHOPEDIC SURGERY | Facility: CLINIC | Age: 39
End: 2024-01-08
Payer: COMMERCIAL

## 2024-01-08 PROCEDURE — 29125 APPL SHORT ARM SPLINT STATIC: CPT | Mod: RT

## 2024-01-08 PROCEDURE — 99213 OFFICE O/P EST LOW 20 MIN: CPT | Mod: 25

## 2024-01-08 NOTE — IMAGING
[de-identified] : RIGHT HAND skin intact. mild swelling of wrist. TTP to thumb CMCJ > 1st metacarpal, distal radius. non-tender to elbow. good EPL, limited FPL. good finger extension, flex to loose fist. SILT to median, ulnar, radial distribution.  palpable radial pulse, brisk cap refill all digits. no triggering.   XRAYS OF RIGHT HAND @ [facility] 1/3/24: no acute displaced fracture or dislocation.  XRAYS OF RIGHT WRIST @[facility] 1/3/24: no acute displaced fracture or dislocation.

## 2024-01-08 NOTE — HISTORY OF PRESENT ILLNESS
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 Reports that she also sustained a concussion from this injury. Went to [place] ER on DOI => XR, splint. She left work early on the DOI, has been OOW since then, plans to return to work on 1/10/24.  Hx: Alpha-thalassemia trait with anemia. Sx: . Cholecystectomy. [FreeTextEntry1] : RIGHT wrist  [FreeTextEntry5] : RAMSES montiel [RHD] 38 year old female is here today c/o RIGHT wrist pain since 01/03/24 after falling. went to [facility] ED on DOI +xrays +splint. c/o pain at radial side of wrist. denies prior injury to wrist/hand  [de-identified] : 01/03/24 [de-identified] : [facility]  [de-identified] : xrays

## 2024-01-08 NOTE — ASSESSMENT
[FreeTextEntry1] : The condition was explained to the patient - RIGHT wrist and thumb CMCJ sprain. - recommend thumb spica brace, full time except hygiene. applied orthoglass (pre-padded fiberglass) thumb spica splint in the interim. reviewed splint care instructions. - light activity.  F/u 2 weeks. X-rays R wrist + thumb CMCJ view out of splint.

## 2024-01-26 ENCOUNTER — APPOINTMENT (OUTPATIENT)
Dept: ORTHOPEDIC SURGERY | Facility: CLINIC | Age: 39
End: 2024-01-26
Payer: COMMERCIAL

## 2024-01-26 DIAGNOSIS — S63.601A UNSPECIFIED SPRAIN OF RIGHT THUMB, INITIAL ENCOUNTER: ICD-10-CM

## 2024-01-26 DIAGNOSIS — S63.501A UNSPECIFIED SPRAIN OF RIGHT WRIST, INITIAL ENCOUNTER: ICD-10-CM

## 2024-01-26 DIAGNOSIS — M65.4 RADIAL STYLOID TENOSYNOVITIS [DE QUERVAIN]: ICD-10-CM

## 2024-01-26 PROCEDURE — 99213 OFFICE O/P EST LOW 20 MIN: CPT

## 2024-01-26 PROCEDURE — 73110 X-RAY EXAM OF WRIST: CPT | Mod: RT

## 2024-01-26 NOTE — ASSESSMENT
[FreeTextEntry1] : - continue thumb spica brace, full time except hygiene. - light activity.  F/u 2 weeks. Will start OT after next visit.

## 2024-01-26 NOTE — HISTORY OF PRESENT ILLNESS
[de-identified] : 24: f/u RIGHT wrist/thumb sprain. in thumb spica brace full time. Not currently taking any medication for this.  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 Reports that she also sustained a concussion from this injury. Went to [place] ER on DOI => XR, splint. She left work early on the DOI, has been OOW since then, plans to return to work on 1/10/24.  Hx: Alpha-thalassemia trait with anemia. Sx: . Cholecystectomy. [FreeTextEntry1] : RIGHT wrist  [FreeTextEntry5] : RAMSES is here today to follow up on her RIGHT wrist. pt wearing thumb spica brace. pt states there are no changes in their symptoms since the last visit.

## 2024-01-26 NOTE — IMAGING
[de-identified] : RIGHT HAND skin intact. no swelling. TTP to thumb 1st ext compartment, thumb CMCJ, thumb MPJ. good EPL, limited FPL. good finger extension, flex to loose fist. SILT to median, ulnar, radial distribution.  palpable radial pulse, brisk cap refill all digits. no triggering. Finkelstein's => pain @thumb CMCJ.  thumb MPJ stress: - RCL: + pain, no instability. - UCL: + pain, no instability at neutral and mild flexion.

## 2024-02-09 ENCOUNTER — APPOINTMENT (OUTPATIENT)
Dept: ORTHOPEDIC SURGERY | Facility: CLINIC | Age: 39
End: 2024-02-09

## 2024-02-20 ENCOUNTER — APPOINTMENT (OUTPATIENT)
Dept: OBGYN | Facility: CLINIC | Age: 39
End: 2024-02-20

## 2024-04-14 ENCOUNTER — EMERGENCY (EMERGENCY)
Facility: HOSPITAL | Age: 39
LOS: 1 days | Discharge: HOME | End: 2024-04-14
Attending: EMERGENCY MEDICINE
Payer: COMMERCIAL

## 2024-04-14 VITALS
SYSTOLIC BLOOD PRESSURE: 96 MMHG | HEART RATE: 120 BPM | RESPIRATION RATE: 18 BRPM | TEMPERATURE: 101 F | WEIGHT: 197.09 LBS | HEIGHT: 61 IN | OXYGEN SATURATION: 99 % | DIASTOLIC BLOOD PRESSURE: 44 MMHG

## 2024-04-14 DIAGNOSIS — K08.499 PARTIAL LOSS OF TEETH DUE TO OTHER SPECIFIED CAUSE, UNSPECIFIED CLASS: Chronic | ICD-10-CM

## 2024-04-14 DIAGNOSIS — Z98.891 HISTORY OF UTERINE SCAR FROM PREVIOUS SURGERY: Chronic | ICD-10-CM

## 2024-04-14 DIAGNOSIS — Z90.49 ACQUIRED ABSENCE OF OTHER SPECIFIED PARTS OF DIGESTIVE TRACT: Chronic | ICD-10-CM

## 2024-04-14 LAB
ADD ON TEST-SPECIMEN IN LAB: SIGNIFICANT CHANGE UP
ALBUMIN SERPL ELPH-MCNC: 4.1 G/DL — SIGNIFICANT CHANGE UP (ref 3.3–5)
ALP SERPL-CCNC: 75 U/L — SIGNIFICANT CHANGE UP (ref 40–120)
ALT FLD-CCNC: 19 U/L — SIGNIFICANT CHANGE UP (ref 10–45)
ANION GAP SERPL CALC-SCNC: 13 MMOL/L — SIGNIFICANT CHANGE UP (ref 5–17)
ANISOCYTOSIS BLD QL: SLIGHT — SIGNIFICANT CHANGE UP
APTT BLD: 33 SEC — SIGNIFICANT CHANGE UP (ref 24.5–35.6)
AST SERPL-CCNC: 28 U/L — SIGNIFICANT CHANGE UP (ref 10–40)
BASE EXCESS BLDV CALC-SCNC: -1.8 MMOL/L — SIGNIFICANT CHANGE UP (ref -2–3)
BASOPHILS # BLD AUTO: 0 K/UL — SIGNIFICANT CHANGE UP (ref 0–0.2)
BASOPHILS # BLD AUTO: 0.02 K/UL — SIGNIFICANT CHANGE UP (ref 0–0.2)
BASOPHILS NFR BLD AUTO: 0 % — SIGNIFICANT CHANGE UP (ref 0–2)
BASOPHILS NFR BLD AUTO: 0.2 % — SIGNIFICANT CHANGE UP (ref 0–2)
BILIRUB SERPL-MCNC: 0.6 MG/DL — SIGNIFICANT CHANGE UP (ref 0.2–1.2)
BUN SERPL-MCNC: 12 MG/DL — SIGNIFICANT CHANGE UP (ref 7–23)
BURR CELLS BLD QL SMEAR: PRESENT — SIGNIFICANT CHANGE UP
CA-I SERPL-SCNC: 1.17 MMOL/L — SIGNIFICANT CHANGE UP (ref 1.15–1.33)
CALCIUM SERPL-MCNC: 9.4 MG/DL — SIGNIFICANT CHANGE UP (ref 8.4–10.5)
CHLORIDE BLDV-SCNC: 103 MMOL/L — SIGNIFICANT CHANGE UP (ref 96–108)
CHLORIDE SERPL-SCNC: 104 MMOL/L — SIGNIFICANT CHANGE UP (ref 96–108)
CO2 BLDV-SCNC: 22 MMOL/L — SIGNIFICANT CHANGE UP (ref 22–26)
CO2 SERPL-SCNC: 20 MMOL/L — LOW (ref 22–31)
CREAT SERPL-MCNC: 0.57 MG/DL — SIGNIFICANT CHANGE UP (ref 0.5–1.3)
EGFR: 121 ML/MIN/1.73M2 — SIGNIFICANT CHANGE UP
ELLIPTOCYTES BLD QL SMEAR: SIGNIFICANT CHANGE UP
EOSINOPHIL # BLD AUTO: 0 K/UL — SIGNIFICANT CHANGE UP (ref 0–0.5)
EOSINOPHIL # BLD AUTO: 0 K/UL — SIGNIFICANT CHANGE UP (ref 0–0.5)
EOSINOPHIL NFR BLD AUTO: 0 % — SIGNIFICANT CHANGE UP (ref 0–6)
EOSINOPHIL NFR BLD AUTO: 0 % — SIGNIFICANT CHANGE UP (ref 0–6)
FLUAV AG NPH QL: SIGNIFICANT CHANGE UP
FLUBV AG NPH QL: SIGNIFICANT CHANGE UP
GAS PNL BLDV: 135 MMOL/L — LOW (ref 136–145)
GAS PNL BLDV: SIGNIFICANT CHANGE UP
GAS PNL BLDV: SIGNIFICANT CHANGE UP
GLUCOSE BLDV-MCNC: 96 MG/DL — SIGNIFICANT CHANGE UP (ref 70–99)
GLUCOSE SERPL-MCNC: 97 MG/DL — SIGNIFICANT CHANGE UP (ref 70–99)
HCG SERPL-ACNC: <2 MIU/ML — SIGNIFICANT CHANGE UP
HCO3 BLDV-SCNC: 22 MMOL/L — SIGNIFICANT CHANGE UP (ref 22–29)
HCT VFR BLD CALC: 32.7 % — LOW (ref 34.5–45)
HCT VFR BLD CALC: 35.4 % — SIGNIFICANT CHANGE UP (ref 34.5–45)
HCT VFR BLDA CALC: 32 % — LOW (ref 34.5–46.5)
HGB BLD CALC-MCNC: 10.7 G/DL — LOW (ref 11.7–16.1)
HGB BLD-MCNC: 10.7 G/DL — LOW (ref 11.5–15.5)
HGB BLD-MCNC: 9.6 G/DL — LOW (ref 11.5–15.5)
IMM GRANULOCYTES NFR BLD AUTO: 0.4 % — SIGNIFICANT CHANGE UP (ref 0–0.9)
INR BLD: 1.27 RATIO — HIGH (ref 0.85–1.18)
LACTATE BLDV-MCNC: 1.8 MMOL/L — SIGNIFICANT CHANGE UP (ref 0.5–2)
LYMPHOCYTES # BLD AUTO: 1.04 K/UL — SIGNIFICANT CHANGE UP (ref 1–3.3)
LYMPHOCYTES # BLD AUTO: 1.22 K/UL — SIGNIFICANT CHANGE UP (ref 1–3.3)
LYMPHOCYTES # BLD AUTO: 10.5 % — LOW (ref 13–44)
LYMPHOCYTES # BLD AUTO: 14.6 % — SIGNIFICANT CHANGE UP (ref 13–44)
MANUAL SMEAR VERIFICATION: SIGNIFICANT CHANGE UP
MCHC RBC-ENTMCNC: 19.3 PG — LOW (ref 27–34)
MCHC RBC-ENTMCNC: 19.7 PG — LOW (ref 27–34)
MCHC RBC-ENTMCNC: 29.4 GM/DL — LOW (ref 32–36)
MCHC RBC-ENTMCNC: 30.2 GM/DL — LOW (ref 32–36)
MCV RBC AUTO: 65.1 FL — LOW (ref 80–100)
MCV RBC AUTO: 65.8 FL — LOW (ref 80–100)
MICROCYTES BLD QL: SIGNIFICANT CHANGE UP
MONOCYTES # BLD AUTO: 0.42 K/UL — SIGNIFICANT CHANGE UP (ref 0–0.9)
MONOCYTES # BLD AUTO: 0.52 K/UL — SIGNIFICANT CHANGE UP (ref 0–0.9)
MONOCYTES NFR BLD AUTO: 5 % — SIGNIFICANT CHANGE UP (ref 2–14)
MONOCYTES NFR BLD AUTO: 5.2 % — SIGNIFICANT CHANGE UP (ref 2–14)
NEUTROPHILS # BLD AUTO: 6.67 K/UL — SIGNIFICANT CHANGE UP (ref 1.8–7.4)
NEUTROPHILS # BLD AUTO: 8.39 K/UL — HIGH (ref 1.8–7.4)
NEUTROPHILS NFR BLD AUTO: 79.1 % — HIGH (ref 43–77)
NEUTROPHILS NFR BLD AUTO: 79.8 % — HIGH (ref 43–77)
NEUTS BAND # BLD: 5.2 % — SIGNIFICANT CHANGE UP (ref 0–8)
NRBC # BLD: 0 /100 WBCS — SIGNIFICANT CHANGE UP (ref 0–0)
OVALOCYTES BLD QL SMEAR: SLIGHT — SIGNIFICANT CHANGE UP
PCO2 BLDV: 31 MMHG — LOW (ref 39–42)
PH BLDV: 7.45 — HIGH (ref 7.32–7.43)
PLAT MORPH BLD: NORMAL — SIGNIFICANT CHANGE UP
PLATELET # BLD AUTO: 390 K/UL — SIGNIFICANT CHANGE UP (ref 150–400)
PLATELET # BLD AUTO: 429 K/UL — HIGH (ref 150–400)
PO2 BLDV: 61 MMHG — HIGH (ref 25–45)
POIKILOCYTOSIS BLD QL AUTO: SIGNIFICANT CHANGE UP
POTASSIUM BLDV-SCNC: 4.9 MMOL/L — SIGNIFICANT CHANGE UP (ref 3.5–5.1)
POTASSIUM SERPL-MCNC: 3.8 MMOL/L — SIGNIFICANT CHANGE UP (ref 3.5–5.3)
POTASSIUM SERPL-SCNC: 3.8 MMOL/L — SIGNIFICANT CHANGE UP (ref 3.5–5.3)
PROT SERPL-MCNC: 7.5 G/DL — SIGNIFICANT CHANGE UP (ref 6–8.3)
PROTHROM AB SERPL-ACNC: 13.2 SEC — HIGH (ref 9.5–13)
RBC # BLD: 4.97 M/UL — SIGNIFICANT CHANGE UP (ref 3.8–5.2)
RBC # BLD: 5.44 M/UL — HIGH (ref 3.8–5.2)
RBC # FLD: 19.9 % — HIGH (ref 10.3–14.5)
RBC # FLD: 20 % — HIGH (ref 10.3–14.5)
RBC BLD AUTO: ABNORMAL
RSV RNA NPH QL NAA+NON-PROBE: SIGNIFICANT CHANGE UP
SAO2 % BLDV: 92.6 % — HIGH (ref 67–88)
SARS-COV-2 RNA SPEC QL NAA+PROBE: SIGNIFICANT CHANGE UP
SCHISTOCYTES BLD QL AUTO: SLIGHT — SIGNIFICANT CHANGE UP
SODIUM SERPL-SCNC: 137 MMOL/L — SIGNIFICANT CHANGE UP (ref 135–145)
WBC # BLD: 8.36 K/UL — SIGNIFICANT CHANGE UP (ref 3.8–10.5)
WBC # BLD: 9.95 K/UL — SIGNIFICANT CHANGE UP (ref 3.8–10.5)
WBC # FLD AUTO: 8.36 K/UL — SIGNIFICANT CHANGE UP (ref 3.8–10.5)
WBC # FLD AUTO: 9.95 K/UL — SIGNIFICANT CHANGE UP (ref 3.8–10.5)

## 2024-04-14 PROCEDURE — 76705 ECHO EXAM OF ABDOMEN: CPT | Mod: 26

## 2024-04-14 PROCEDURE — 99223 1ST HOSP IP/OBS HIGH 75: CPT

## 2024-04-14 PROCEDURE — 74177 CT ABD & PELVIS W/CONTRAST: CPT | Mod: 26,MC

## 2024-04-14 RX ORDER — SODIUM CHLORIDE 9 MG/ML
1000 INJECTION INTRAMUSCULAR; INTRAVENOUS; SUBCUTANEOUS ONCE
Refills: 0 | Status: COMPLETED | OUTPATIENT
Start: 2024-04-14 | End: 2024-04-14

## 2024-04-14 RX ORDER — ACETAMINOPHEN 500 MG
650 TABLET ORAL ONCE
Refills: 0 | Status: DISCONTINUED | OUTPATIENT
Start: 2024-04-14 | End: 2024-04-14

## 2024-04-14 RX ORDER — KETOROLAC TROMETHAMINE 30 MG/ML
15 SYRINGE (ML) INJECTION ONCE
Refills: 0 | Status: DISCONTINUED | OUTPATIENT
Start: 2024-04-14 | End: 2024-04-14

## 2024-04-14 RX ORDER — ONDANSETRON 8 MG/1
4 TABLET, FILM COATED ORAL ONCE
Refills: 0 | Status: COMPLETED | OUTPATIENT
Start: 2024-04-14 | End: 2024-04-14

## 2024-04-14 RX ORDER — SODIUM CHLORIDE 9 MG/ML
1000 INJECTION, SOLUTION INTRAVENOUS ONCE
Refills: 0 | Status: COMPLETED | OUTPATIENT
Start: 2024-04-14 | End: 2024-04-14

## 2024-04-14 RX ORDER — ACETAMINOPHEN 500 MG
1000 TABLET ORAL ONCE
Refills: 0 | Status: COMPLETED | OUTPATIENT
Start: 2024-04-14 | End: 2024-04-14

## 2024-04-14 RX ADMIN — Medication 15 MILLIGRAM(S): at 21:00

## 2024-04-14 RX ADMIN — ONDANSETRON 4 MILLIGRAM(S): 8 TABLET, FILM COATED ORAL at 19:25

## 2024-04-14 RX ADMIN — Medication 1000 MILLIGRAM(S): at 20:00

## 2024-04-14 RX ADMIN — Medication 400 MILLIGRAM(S): at 19:25

## 2024-04-14 RX ADMIN — Medication 15 MILLIGRAM(S): at 21:54

## 2024-04-14 RX ADMIN — Medication 15 MILLIGRAM(S): at 23:10

## 2024-04-14 RX ADMIN — SODIUM CHLORIDE 1000 MILLILITER(S): 9 INJECTION, SOLUTION INTRAVENOUS at 23:10

## 2024-04-14 RX ADMIN — SODIUM CHLORIDE 1000 MILLILITER(S): 9 INJECTION INTRAMUSCULAR; INTRAVENOUS; SUBCUTANEOUS at 19:26

## 2024-04-14 RX ADMIN — Medication 1000 MILLIGRAM(S): at 19:40

## 2024-04-14 RX ADMIN — SODIUM CHLORIDE 1000 MILLILITER(S): 9 INJECTION INTRAMUSCULAR; INTRAVENOUS; SUBCUTANEOUS at 20:26

## 2024-04-14 NOTE — ED PROVIDER NOTE - CLINICAL SUMMARY MEDICAL DECISION MAKING FREE TEXT BOX
Given history and physical exam clinical concern for norovirus versus pancreatitis versus urinary tract infection vs dehydration vs e- abnormalities Patient will likely be screenings for such Given history and physical exam clinical concern for norovirus versus pancreatitis versus urinary tract infection vs dehydration vs e- abnormalities Patient will likely be screenings for such  Attending Matilda Hays: 38-year-old female presenting with nausea, vomiting and diarrhea that began today.  No recent antibiotic use and patient low risk for C. difficile.  No recent travel.  No one with similar sick contacts at home.  Patient is passing gas and having diarrhea making SBO less likely.  Consider possible gastroenteritis versus gastritis versus colitis.  Less likely diverticulitis.  Patient with low-grade temperature in the emergency room.  Will obtain labs, give IV fluids, give antiemetics, consider CT scan to further evaluate.

## 2024-04-14 NOTE — ED PROVIDER NOTE - PROGRESS NOTE DETAILS
Guillaume PGY 3 Discussed lab and radiology findings with patient. Patient feels comfortable going home. Gave home care and follow up instructions. Discussed which symptoms to look out for and when to return to the ED for further evaluation. Patient given opportunity to ask questions about their medical condition and had all questions answered.

## 2024-04-14 NOTE — ED ADULT NURSE NOTE - OBJECTIVE STATEMENT
39 y/o female came to the ED with complaints of a fever, N, V, D, abdominal pains, since this am did not take anything for the fever, states she had diarrhea x 9, did take imodium for that. Denies  dysuria, CP, SOB, sore throat.

## 2024-04-14 NOTE — ED PROVIDER NOTE - OBJECTIVE STATEMENT
38F w/o sig pmh cc abd pain n/v diarrhea started this morning. PT had fever, did not take any medicine for such,   previous surgery jean and c section no dysuria 38F w/o sig pmh cc abd pain n/v diarrhea started this morning. PT had fever, did not take any medicine for such,   previous surgery jean and c section no dysuria  Attending Matilda Hays: 38-year-old female presenting with concern for nausea, vomiting, diarrhea and abdominal pain.  Patient states today has had multiple episodes of nonbloody diarrhea and multiple episodes of nonbloody emesis.  Also reports severe abdominal pain.  Patient without any recent travel and no recent antibiotic use.  No dysuria.  No sick contacts at home.  Prior surgical history includes a prior cholecystectomy as well as prior C-sections.  Patient denies any dysuria or any joint pain.  No sore throat or difficulty swallowing.  No worsening pain with dilation. no recent abx

## 2024-04-14 NOTE — ED PROVIDER NOTE - ATTENDING CONTRIBUTION TO CARE
Attending MD Matilda Hays:  I personally have seen and examined this patient.  Resident note reviewed and agree on plan of care and except where noted.  See HPI, PE, and MDM for details.

## 2024-04-14 NOTE — ED PROVIDER NOTE - PHYSICAL EXAMINATION
GENERAL: Awake, alert, NAD  LUNGS: non labored breathing   CARDIAC: RRR, no m/r/g  ABDOMEN: Soft, tender mild all throughout abd w/o one location   BACK: No midline spinal tenderness, cva tenderness b/l   EXT: No edema, no calf tenderness, no deformities.  NEURO: A&Ox3. Moving all extremities.  SKIN: Warm and dry. No rash.  PSYCH: Normal affect. GENERAL: Awake, alert, NAD  LUNGS: non labored breathing   CARDIAC: RRR, no m/r/g  ABDOMEN: Soft, tender mild all throughout abd w/o one location   BACK: No midline spinal tenderness, cva tenderness b/l   EXT: No edema, no calf tenderness, no deformities.  NEURO: A&Ox3. Moving all extremities.  SKIN: Warm and dry. No rash.  PSYCH: Normal affect.  Attending Matilda Hays: Gen: NAD, heent: atrauamtic, eomi, perrla, mmm, op pink,  neck; nttp, no nuchal rigidity, chest: nttp, no crepitus, cv: rrr, no murmurs, lungs: ctab, abd: soft, ttp lower abdomen and epigastric area, no guarding, ext: wwp, neg homans, skin: no rash, neuro: awake and alert, following commands, speech clear, sensation and strength intact, no focal deficits

## 2024-04-15 VITALS
RESPIRATION RATE: 18 BRPM | DIASTOLIC BLOOD PRESSURE: 67 MMHG | TEMPERATURE: 99 F | HEART RATE: 90 BPM | SYSTOLIC BLOOD PRESSURE: 102 MMHG | OXYGEN SATURATION: 99 %

## 2024-04-15 LAB
ADD ON TEST-SPECIMEN IN LAB: SIGNIFICANT CHANGE UP
ALBUMIN SERPL ELPH-MCNC: 3.5 G/DL — SIGNIFICANT CHANGE UP (ref 3.3–5)
ALBUMIN SERPL ELPH-MCNC: 3.8 G/DL — SIGNIFICANT CHANGE UP (ref 3.3–5)
ALP SERPL-CCNC: 118 U/L — SIGNIFICANT CHANGE UP (ref 40–120)
ALP SERPL-CCNC: 140 U/L — HIGH (ref 40–120)
ALT FLD-CCNC: 114 U/L — HIGH (ref 10–45)
ALT FLD-CCNC: 125 U/L — HIGH (ref 10–45)
ANION GAP SERPL CALC-SCNC: 10 MMOL/L — SIGNIFICANT CHANGE UP (ref 5–17)
ANION GAP SERPL CALC-SCNC: 13 MMOL/L — SIGNIFICANT CHANGE UP (ref 5–17)
APPEARANCE UR: CLEAR — SIGNIFICANT CHANGE UP
AST SERPL-CCNC: 169 U/L — HIGH (ref 10–40)
AST SERPL-CCNC: 210 U/L — HIGH (ref 10–40)
BACTERIA # UR AUTO: NEGATIVE /HPF — SIGNIFICANT CHANGE UP
BASOPHILS # BLD AUTO: 0.01 K/UL — SIGNIFICANT CHANGE UP (ref 0–0.2)
BASOPHILS NFR BLD AUTO: 0.2 % — SIGNIFICANT CHANGE UP (ref 0–2)
BILIRUB SERPL-MCNC: 0.8 MG/DL — SIGNIFICANT CHANGE UP (ref 0.2–1.2)
BILIRUB SERPL-MCNC: 0.8 MG/DL — SIGNIFICANT CHANGE UP (ref 0.2–1.2)
BILIRUB UR-MCNC: NEGATIVE — SIGNIFICANT CHANGE UP
BUN SERPL-MCNC: 10 MG/DL — SIGNIFICANT CHANGE UP (ref 7–23)
BUN SERPL-MCNC: 12 MG/DL — SIGNIFICANT CHANGE UP (ref 7–23)
CALCIUM SERPL-MCNC: 7.8 MG/DL — LOW (ref 8.4–10.5)
CALCIUM SERPL-MCNC: 8.6 MG/DL — SIGNIFICANT CHANGE UP (ref 8.4–10.5)
CHLORIDE SERPL-SCNC: 106 MMOL/L — SIGNIFICANT CHANGE UP (ref 96–108)
CHLORIDE SERPL-SCNC: 107 MMOL/L — SIGNIFICANT CHANGE UP (ref 96–108)
CO2 SERPL-SCNC: 19 MMOL/L — LOW (ref 22–31)
CO2 SERPL-SCNC: 22 MMOL/L — SIGNIFICANT CHANGE UP (ref 22–31)
COLOR SPEC: YELLOW — SIGNIFICANT CHANGE UP
CREAT SERPL-MCNC: 0.6 MG/DL — SIGNIFICANT CHANGE UP (ref 0.5–1.3)
CREAT SERPL-MCNC: 0.62 MG/DL — SIGNIFICANT CHANGE UP (ref 0.5–1.3)
DIFF PNL FLD: ABNORMAL
EGFR: 118 ML/MIN/1.73M2 — SIGNIFICANT CHANGE UP
EGFR: 119 ML/MIN/1.73M2 — SIGNIFICANT CHANGE UP
EOSINOPHIL # BLD AUTO: 0 K/UL — SIGNIFICANT CHANGE UP (ref 0–0.5)
EOSINOPHIL NFR BLD AUTO: 0 % — SIGNIFICANT CHANGE UP (ref 0–6)
EPI CELLS # UR: PRESENT
GLUCOSE SERPL-MCNC: 106 MG/DL — HIGH (ref 70–99)
GLUCOSE SERPL-MCNC: 108 MG/DL — HIGH (ref 70–99)
GLUCOSE UR QL: NEGATIVE MG/DL — SIGNIFICANT CHANGE UP
HAPTOGLOB SERPL-MCNC: 153 MG/DL — SIGNIFICANT CHANGE UP (ref 34–200)
HCT VFR BLD CALC: 30.2 % — LOW (ref 34.5–45)
HGB BLD-MCNC: 9 G/DL — LOW (ref 11.5–15.5)
HYALINE CASTS # UR AUTO: SIGNIFICANT CHANGE UP
IMM GRANULOCYTES NFR BLD AUTO: 0.4 % — SIGNIFICANT CHANGE UP (ref 0–0.9)
IRON SATN MFR SERPL: 17 UG/DL — LOW (ref 30–160)
KETONES UR-MCNC: ABNORMAL MG/DL
LDH SERPL L TO P-CCNC: 252 U/L — HIGH (ref 50–242)
LEUKOCYTE ESTERASE UR-ACNC: NEGATIVE — SIGNIFICANT CHANGE UP
LYMPHOCYTES # BLD AUTO: 1.13 K/UL — SIGNIFICANT CHANGE UP (ref 1–3.3)
LYMPHOCYTES # BLD AUTO: 22.4 % — SIGNIFICANT CHANGE UP (ref 13–44)
MCHC RBC-ENTMCNC: 19.8 PG — LOW (ref 27–34)
MCHC RBC-ENTMCNC: 29.8 GM/DL — LOW (ref 32–36)
MCV RBC AUTO: 66.4 FL — LOW (ref 80–100)
MONOCYTES # BLD AUTO: 0.52 K/UL — SIGNIFICANT CHANGE UP (ref 0–0.9)
MONOCYTES NFR BLD AUTO: 10.3 % — SIGNIFICANT CHANGE UP (ref 2–14)
NEUTROPHILS # BLD AUTO: 3.36 K/UL — SIGNIFICANT CHANGE UP (ref 1.8–7.4)
NEUTROPHILS NFR BLD AUTO: 66.7 % — SIGNIFICANT CHANGE UP (ref 43–77)
NITRITE UR-MCNC: NEGATIVE — SIGNIFICANT CHANGE UP
NRBC # BLD: 0 /100 WBCS — SIGNIFICANT CHANGE UP (ref 0–0)
PH UR: 6 — SIGNIFICANT CHANGE UP (ref 5–8)
PLATELET # BLD AUTO: 354 K/UL — SIGNIFICANT CHANGE UP (ref 150–400)
POTASSIUM SERPL-MCNC: 3.3 MMOL/L — LOW (ref 3.5–5.3)
POTASSIUM SERPL-MCNC: 3.6 MMOL/L — SIGNIFICANT CHANGE UP (ref 3.5–5.3)
POTASSIUM SERPL-SCNC: 3.3 MMOL/L — LOW (ref 3.5–5.3)
POTASSIUM SERPL-SCNC: 3.6 MMOL/L — SIGNIFICANT CHANGE UP (ref 3.5–5.3)
PROT SERPL-MCNC: 5.8 G/DL — LOW (ref 6–8.3)
PROT SERPL-MCNC: 6.6 G/DL — SIGNIFICANT CHANGE UP (ref 6–8.3)
PROT UR-MCNC: SIGNIFICANT CHANGE UP MG/DL
RBC # BLD: 4.55 M/UL — SIGNIFICANT CHANGE UP (ref 3.8–5.2)
RBC # FLD: 19.6 % — HIGH (ref 10.3–14.5)
RBC CASTS # UR COMP ASSIST: 0 /HPF — SIGNIFICANT CHANGE UP (ref 0–4)
SODIUM SERPL-SCNC: 138 MMOL/L — SIGNIFICANT CHANGE UP (ref 135–145)
SODIUM SERPL-SCNC: 139 MMOL/L — SIGNIFICANT CHANGE UP (ref 135–145)
SP GR SPEC: >1.03 — HIGH (ref 1–1.03)
UROBILINOGEN FLD QL: 0.2 MG/DL — SIGNIFICANT CHANGE UP (ref 0.2–1)
WBC # BLD: 5.04 K/UL — SIGNIFICANT CHANGE UP (ref 3.8–10.5)
WBC # FLD AUTO: 5.04 K/UL — SIGNIFICANT CHANGE UP (ref 3.8–10.5)
WBC UR QL: 3 /HPF — SIGNIFICANT CHANGE UP (ref 0–5)

## 2024-04-15 PROCEDURE — 84295 ASSAY OF SERUM SODIUM: CPT

## 2024-04-15 PROCEDURE — 96374 THER/PROPH/DIAG INJ IV PUSH: CPT | Mod: XU

## 2024-04-15 PROCEDURE — 83540 ASSAY OF IRON: CPT

## 2024-04-15 PROCEDURE — 83690 ASSAY OF LIPASE: CPT

## 2024-04-15 PROCEDURE — 83010 ASSAY OF HAPTOGLOBIN QUANT: CPT

## 2024-04-15 PROCEDURE — 84484 ASSAY OF TROPONIN QUANT: CPT

## 2024-04-15 PROCEDURE — 85014 HEMATOCRIT: CPT

## 2024-04-15 PROCEDURE — 85025 COMPLETE CBC W/AUTO DIFF WBC: CPT

## 2024-04-15 PROCEDURE — 84132 ASSAY OF SERUM POTASSIUM: CPT

## 2024-04-15 PROCEDURE — 85730 THROMBOPLASTIN TIME PARTIAL: CPT

## 2024-04-15 PROCEDURE — 87086 URINE CULTURE/COLONY COUNT: CPT

## 2024-04-15 PROCEDURE — 76705 ECHO EXAM OF ABDOMEN: CPT

## 2024-04-15 PROCEDURE — 85018 HEMOGLOBIN: CPT

## 2024-04-15 PROCEDURE — G0378: CPT

## 2024-04-15 PROCEDURE — 96375 TX/PRO/DX INJ NEW DRUG ADDON: CPT

## 2024-04-15 PROCEDURE — 87637 SARSCOV2&INF A&B&RSV AMP PRB: CPT

## 2024-04-15 PROCEDURE — 87040 BLOOD CULTURE FOR BACTERIA: CPT

## 2024-04-15 PROCEDURE — 82947 ASSAY GLUCOSE BLOOD QUANT: CPT

## 2024-04-15 PROCEDURE — 85045 AUTOMATED RETICULOCYTE COUNT: CPT

## 2024-04-15 PROCEDURE — 85610 PROTHROMBIN TIME: CPT

## 2024-04-15 PROCEDURE — 82435 ASSAY OF BLOOD CHLORIDE: CPT

## 2024-04-15 PROCEDURE — 83605 ASSAY OF LACTIC ACID: CPT

## 2024-04-15 PROCEDURE — 84702 CHORIONIC GONADOTROPIN TEST: CPT

## 2024-04-15 PROCEDURE — 93005 ELECTROCARDIOGRAM TRACING: CPT

## 2024-04-15 PROCEDURE — 96361 HYDRATE IV INFUSION ADD-ON: CPT

## 2024-04-15 PROCEDURE — 83735 ASSAY OF MAGNESIUM: CPT

## 2024-04-15 PROCEDURE — 80053 COMPREHEN METABOLIC PANEL: CPT

## 2024-04-15 PROCEDURE — 99238 HOSP IP/OBS DSCHRG MGMT 30/<: CPT

## 2024-04-15 PROCEDURE — 80074 ACUTE HEPATITIS PANEL: CPT

## 2024-04-15 PROCEDURE — 74177 CT ABD & PELVIS W/CONTRAST: CPT | Mod: MC

## 2024-04-15 PROCEDURE — 83615 LACTATE (LD) (LDH) ENZYME: CPT

## 2024-04-15 PROCEDURE — 82330 ASSAY OF CALCIUM: CPT

## 2024-04-15 PROCEDURE — 81001 URINALYSIS AUTO W/SCOPE: CPT

## 2024-04-15 PROCEDURE — 96376 TX/PRO/DX INJ SAME DRUG ADON: CPT

## 2024-04-15 PROCEDURE — 99285 EMERGENCY DEPT VISIT HI MDM: CPT | Mod: 25

## 2024-04-15 PROCEDURE — 82803 BLOOD GASES ANY COMBINATION: CPT

## 2024-04-15 RX ORDER — POTASSIUM CHLORIDE 20 MEQ
40 PACKET (EA) ORAL ONCE
Refills: 0 | Status: COMPLETED | OUTPATIENT
Start: 2024-04-15 | End: 2024-04-15

## 2024-04-15 RX ORDER — SODIUM CHLORIDE 9 MG/ML
1000 INJECTION, SOLUTION INTRAVENOUS
Refills: 0 | Status: DISCONTINUED | OUTPATIENT
Start: 2024-04-15 | End: 2024-04-18

## 2024-04-15 RX ADMIN — SODIUM CHLORIDE 100 MILLILITER(S): 9 INJECTION, SOLUTION INTRAVENOUS at 01:52

## 2024-04-15 RX ADMIN — Medication 40 MILLIEQUIVALENT(S): at 01:50

## 2024-04-15 NOTE — ED CDU PROVIDER INITIAL DAY NOTE - ATTENDING APP SHARED VISIT CONTRIBUTION OF CARE
Attending MD Hays:  I have personally performed a face to face diagnostic evaluation on this patient.  I have reviewed the ACP note and agree with the history, exam, and plan of care, except as noted.

## 2024-04-15 NOTE — ED CDU PROVIDER INITIAL DAY NOTE - CLINICAL SUMMARY MEDICAL DECISION MAKING FREE TEXT BOX
Attending Matilda Hays: 37 yo female presenting with abdominal pain with associated n/v/d. placed in cdu for fluids, serial abdominal exam, gi pcr

## 2024-04-15 NOTE — ED ADULT NURSE REASSESSMENT NOTE - NS ED NURSE REASSESS COMMENT FT1
Pt discharged as per provider Sima Cummings MD. Discharge teaching provided by RAJAN Corona. Pt verbalizes understanding to discharge orders & will follow up with PMD post discharge. IV lock removed. No bleeding noted. Pt stable upon discharge.
Pt received from SHINE Gamez. Pt oriented to CDU & plan of care was discussed. Pt A&O x 4. Pt in CDU for IV hydration, tele monitoring and repeat labs. Pt denies any chest pain, SOB, dizziness or palpitations at this time. V/S stable, pt afebrile,  IV in place, patent and free of signs of infiltration. Pt resting in bed. Safety & comfort measures maintained. Call bell in reach. Care continues.
Pt received from SHINE Klein. Pt oriented to CDU & plan of care was discussed. Pt A&O x 4. Pt in CDU for frequent reeval, vitals q 4hrs, ivf, serial abdominal exams, pain control. Pt denies any diarrhea, vomiting, abdominal pain, chest pain, SOB, dizziness or palpitations. V/S stable, pt afebrile,  IV in place, patent and free of signs of infiltration. Pt resting in bed. Safety & comfort measures maintained. Call bell in reach. Will continue to monitor.

## 2024-04-15 NOTE — ED CDU PROVIDER INITIAL DAY NOTE - PHYSICAL EXAMINATION
GENERAL: Awake, alert, NAD  LUNGS: non labored breathing   CARDIAC: RRR, no m/r/g  ABDOMEN: Soft, +mild ttp throughout abd. No rebound or guarding. No CVAT  BACK: No midline spinal tenderness, cva tenderness b/l   EXT: No edema, no calf tenderness, no deformities.  NEURO: A&Ox3. Moving all extremities.  SKIN: Warm and dry. No rash.

## 2024-04-15 NOTE — ED CDU PROVIDER DISPOSITION NOTE - PATIENT PORTAL LINK FT
You can access the FollowMyHealth Patient Portal offered by [facility] by registering at the following website: [email or web address]. By joining [employer]’s FollowMyHealth portal, you will also be able to view your health information using other applications (apps) compatible with our system.

## 2024-04-15 NOTE — ED CDU PROVIDER SUBSEQUENT DAY NOTE - CLINICAL SUMMARY MEDICAL DECISION MAKING FREE TEXT BOX
RGUJRAL RGUJRAL 38-year-old female no past medical history presented to the emergency department with nausea, vomiting, diarrhea.  Patient had labs and CT completed that were reviewed.  Patient was noted to be anemic states she has a history of anemia and is aware.  Patient was placed in CDU for observation.  Patient states she has not had any episodes of nausea vomiting or diarrhea at this time.  Patient's liver function test slightly elevated on repeat blood work on April 14 now leveling and decreasing mildly on repeat blood work this morning.  On exam, patient's abdomen is soft nontender.  Patient states she ate some crackers with mild cramps.  Patient has sick contact at home with both children with similar symptoms.  Denies any travel.  Mother at bedside.  Patient is requesting discharge home and states she will attempt to eat her own food at home and hydrate.  Discharge plan follow-up with GI for repeat liver function test and strict return precautions discussed.

## 2024-04-15 NOTE — ED ADULT NURSE REASSESSMENT NOTE - NSFALLUNIVINTERV_ED_ALL_ED
Bed/Stretcher in lowest position, wheels locked, appropriate side rails in place/Call bell, personal items and telephone in reach/Instruct patient to call for assistance before getting out of bed/chair/stretcher/Non-slip footwear applied when patient is off stretcher/[place] to call system/Physically safe environment - no spills, clutter or unnecessary equipment/Purposeful proactive rounding/Room/bathroom lighting operational, light cord in reach

## 2024-04-15 NOTE — ED CDU PROVIDER DISPOSITION NOTE - CLINICAL COURSE
38-year-old female presenting with concern for nausea, vomiting, diarrhea and abdominal pain.  Patient states today has had multiple episodes of nonbloody diarrhea and multiple episodes of nonbloody emesis today.  Also reports severe abdominal pain. Pt states daughter at home with similar symptoms, diarrhea/abdominal pain.  Patient without any recent travel and no recent antibiotic use.  No dysuria. Prior surgical history includes a prior cholecystectomy as well as prior C-sections.  Patient denies any dysuria or any joint pain.  No sore throat or difficulty swallowing.  No worsening pain with dilation. no recent abx  In ED, patient tachycardiac to 120 bpm, BP 96/44 afebrile. Laboratory significant for transaminitis, hypokalemia. CT abdomen/pelvis showed Liquid stool throughout the colon, which is typically seen with diarrhea. No abnormal bowel wall thickening or pericolonic inflammation. Pt sent to CDU for frequent reeval, vitals q 4hrs, ivf, serial abdominal exams, pain control 38-year-old female presenting with concern for nausea, vomiting, diarrhea and abdominal pain.  Patient states today has had multiple episodes of nonbloody diarrhea and multiple episodes of nonbloody emesis today.  Also reports severe abdominal pain. Pt states daughter at home with similar symptoms, diarrhea/abdominal pain.  Patient without any recent travel and no recent antibiotic use.  No dysuria. Prior surgical history includes a prior cholecystectomy as well as prior C-sections.  Patient denies any dysuria or any joint pain.  No sore throat or difficulty swallowing.  No worsening pain with dilation. no recent abx  In ED, patient tachycardiac to 120 bpm, BP 96/44 afebrile. Laboratory significant for transaminitis, hypokalemia. CT abdomen/pelvis showed Liquid stool throughout the colon, which is typically seen with diarrhea. No abnormal bowel wall thickening or pericolonic inflammation. Pt sent to CDU for frequent reeval, vitals q 4hrs, ivf, serial abdominal exams, pain control  Patient did well overnight with no vomiting or diarrhea. patient feeling improved in am with trial of PO. HD stable. patient stable for discharge home this morning to advance diet as tolerated. case discussed with Dr. Cummings

## 2024-04-15 NOTE — ED CDU PROVIDER INITIAL DAY NOTE - OBJECTIVE STATEMENT
38-year-old female presenting with concern for nausea, vomiting, diarrhea and abdominal pain.  Patient states today has had multiple episodes of nonbloody diarrhea and multiple episodes of nonbloody emesis today.  Also reports severe abdominal pain. Pt states daughter at home with similar symptoms, diarrhea/abdominal pain.  Patient without any recent travel and no recent antibiotic use.  No dysuria. Prior surgical history includes a prior cholecystectomy as well as prior C-sections.  Patient denies any dysuria or any joint pain.  No sore throat or difficulty swallowing.  No worsening pain with dilation. no recent abx  In ED, patient tachycardiac to 120 bpm, BP 96/44 afebrile. Laboratory significant for transaminitis, hypokalemia. CT abdomen/pelvis showed Liquid stool throughout the colon, which is typically seen with diarrhea. No abnormal bowel wall thickening or pericolonic inflammation. Pt sent to CDU for frequent reeval, vitals q 4hrs, ivf, serial abdominal exams, pain control

## 2024-04-15 NOTE — ED CDU PROVIDER DISPOSITION NOTE - ATTENDING APP SHARED VISIT CONTRIBUTION OF CARE
RGUJRAL 38-year-old female no past medical history presented to the emergency department with nausea, vomiting, diarrhea.  Patient had labs and CT completed that were reviewed.  Patient was noted to be anemic states she has a history of anemia and is aware.  Patient was placed in CDU for observation.  Patient states she has not had any episodes of nausea vomiting or diarrhea at this time.  Patient's liver function test slightly elevated on repeat blood work on April 14 now leveling and decreasing mildly on repeat blood work this morning.  On exam, patient's abdomen is soft nontender.  Patient states she ate some crackers with mild cramps.  Patient has sick contact at home with both children with similar symptoms.  Denies any travel.  Mother at bedside.  Patient is requesting discharge home and states she will attempt to eat her own food at home and hydrate.  Discharge plan follow-up with GI for repeat liver function test and strict return precautions discussed.

## 2024-04-15 NOTE — ED CDU PROVIDER DISPOSITION NOTE - NSFOLLOWUPINSTRUCTIONS_ED_ALL_ED_FT
1) Follow-up with your Primary Medical Doctor or referred doctor. Call today / next business day for prompt follow-up.  2) Return to Emergency room for any worsening or persistent pain, weakness, fever, or any other concerning symptoms.  3) See attached instruction sheets for additional information, including information regarding signs and symptoms to look out for, reasons to seek immediate care and other important instructions.  4) Follow-up with any specialists as discussed / noted as well. Follow up with your Primary Care Physician within the next 2-3 days  Bring a copy of your test results with you to your appointment  Continue your current medication regimen  Return to the Emergency Room if you experience new or worsening symptoms  stay hydrated  bland diet - advance as tolerated  Read the attached information packet regarding gastroenteritis  Ensure proper, regular handwashing and disinfecting surfaces at home to help reduce spread to other family members Follow up with your Primary Care Physician within the next 2-3 days - you were noted to have some findings that require follow up including anemia, mild elevation in your liver enzymes and blood in the urine.  Bring a copy of your test results with you to your appointment  Continue your current medication regimen  Return to the Emergency Room if you experience new or worsening symptoms  stay hydrated  bland diet - advance as tolerated  Read the attached information packet regarding gastroenteritis  Ensure proper, regular handwashing and disinfecting surfaces at home to help reduce spread to other family members Follow up with your Primary Care Physician within the next 2-3 days - you were noted to have some findings that require follow up including anemia, mild elevation in your liver enzymes and blood in the urine.  Follow up with gastroenterology to follow up  Bring a copy of your test results with you to your appointment  Continue your current medication regimen  Return to the Emergency Room if you experience new or worsening symptoms  stay hydrated  bland diet - advance as tolerated  Read the attached information packet regarding gastroenteritis  Ensure proper, regular handwashing and disinfecting surfaces at home to help reduce spread to other family members

## 2024-04-15 NOTE — ED CDU PROVIDER SUBSEQUENT DAY NOTE - HISTORY
CDU PROGRESS NOTE PA EZE: Pt resting comfortably, NAD, VSS. HR improved 98-102bpm. Pt reports mild discomfort to abdomen. Denies episodes of diarrhea/vomiting. On exam, +bowel sounds throughout. No rebound or guarding. Pt declines analgesia now, will continue IVF and monitor overnight.

## 2024-04-15 NOTE — ED CDU PROVIDER INITIAL DAY NOTE - DETAILS
38-year-old female presenting with concern for nausea, vomiting, diarrhea and abdominal pain.   Viral gastroenteritis  Plan: frequent reeval, vitals q 4hrs, ivf, serial abdominal exams, pain control

## 2024-04-16 LAB
CULTURE RESULTS: SIGNIFICANT CHANGE UP
HAV IGM SER-ACNC: SIGNIFICANT CHANGE UP
HBV CORE IGM SER-ACNC: SIGNIFICANT CHANGE UP
HBV SURFACE AG SER-ACNC: SIGNIFICANT CHANGE UP
HCV AB S/CO SERPL IA: 0.07 S/CO — SIGNIFICANT CHANGE UP (ref 0–0.99)
HCV AB SERPL-IMP: SIGNIFICANT CHANGE UP
SPECIMEN SOURCE: SIGNIFICANT CHANGE UP

## 2024-07-03 ENCOUNTER — APPOINTMENT (OUTPATIENT)
Dept: GASTROENTEROLOGY | Facility: CLINIC | Age: 39
End: 2024-07-03

## 2024-07-03 ENCOUNTER — APPOINTMENT (OUTPATIENT)
Dept: GASTROENTEROLOGY | Facility: CLINIC | Age: 39
End: 2024-07-03
Payer: COMMERCIAL

## 2024-07-03 VITALS
OXYGEN SATURATION: 99 % | DIASTOLIC BLOOD PRESSURE: 76 MMHG | WEIGHT: 193 LBS | SYSTOLIC BLOOD PRESSURE: 120 MMHG | HEART RATE: 87 BPM | HEIGHT: 62 IN | TEMPERATURE: 97.5 F | BODY MASS INDEX: 35.51 KG/M2

## 2024-07-03 DIAGNOSIS — K59.9 FUNCTIONAL INTESTINAL DISORDER, UNSPECIFIED: ICD-10-CM

## 2024-07-03 DIAGNOSIS — K58.9 IRRITABLE BOWEL SYNDROME W/OUT DIARRHEA: ICD-10-CM

## 2024-07-03 DIAGNOSIS — Z83.49 FAMILY HISTORY OF OTHER ENDOCRINE, NUTRITIONAL AND METABOLIC DISEASES: ICD-10-CM

## 2024-07-03 DIAGNOSIS — Z83.42 FAMILY HISTORY OF FAMILIAL HYPERCHOLESTEROLEMIA: ICD-10-CM

## 2024-07-03 DIAGNOSIS — K90.89 OTHER INTESTINAL MALABSORPTION: ICD-10-CM

## 2024-07-03 DIAGNOSIS — Z84.2 FAMILY HISTORY OF OTHER DISEASES OF THE GENITOURINARY SYSTEM: ICD-10-CM

## 2024-07-03 DIAGNOSIS — R73.9 HYPERGLYCEMIA, UNSPECIFIED: ICD-10-CM

## 2024-07-03 DIAGNOSIS — Z83.3 FAMILY HISTORY OF DIABETES MELLITUS: ICD-10-CM

## 2024-07-03 PROCEDURE — 99214 OFFICE O/P EST MOD 30 MIN: CPT

## 2024-10-12 NOTE — ED ADULT NURSE NOTE - NSFALLRSKINDICATORS_ED_ALL_ED
PAST MEDICAL HISTORY:  Iron deficiency anemia     Keratoconus of both eyes     Parotitis December 2015    Pre-diabetes     Thrombocytosis     Vitamin D deficiency     
no

## 2024-12-25 ENCOUNTER — NON-APPOINTMENT (OUTPATIENT)
Age: 39
End: 2024-12-25

## 2025-01-05 NOTE — ASU DISCHARGE PLAN (ADULT/PEDIATRIC) - CALL YOUR DOCTOR IF YOU HAVE ANY OF THE FOLLOWING:
Bleeding that does not stop/Swelling that gets worse/Pain not relieved by Medications/Fever greater than (need to indicate Fahrenheit or Celsius)/Wound/Surgical Site with redness, or foul smelling discharge or pus/Numbness, tingling, color or temperature change to extremity/Nausea and vomiting that does not stop/Excessive diarrhea/Inability to tolerate liquids or foods/Increased irritability or sluggishness
Yes

## 2025-02-11 NOTE — ED ADULT NURSE NOTE - NS ED NURSE REPORT GIVEN TO FT
Completed post angio callback to Erickson Jimenez at primary contact number. Reminded of post procedure follow-up scheduled on 2/12/2025. Patient appreciative of call.    Curtis saucedo RN Joshua saucedo RN

## 2025-02-27 NOTE — ED PROVIDER NOTE - CARE PLAN
"March 4, 2025          Magali Roland MD  [address] [facility] [address]  [place] [address]      RE:   Angelica Gomez 1979      Dear Colleague,    Thank you for referring your patient, Angelica Gomez, to [facility]. Please see a copy of my visit note below.    Angelica Gomez presents today for breast care follow-up exam.  She underwent bilateral nipple sparing mastectomy in February of last year.  She underwent adjuvant chemotherapy but no radiation.  She has had her final implant placement and is healing well.       Physical Exam:  /82   Pulse 95   Resp 16   Ht 1.626 m (5' 4\")   Wt 81.6 kg (180 lb)   SpO2 100%   BMI 30.90 kg/m    Patient appears healthy and alert.  Pleasant affect  No cervical lymphadenopathy or thyroid fullness.  Breathing comfortably, lung fields clear  Bilateral breast exam performed.  Well-healed bilateral nipple sparing mastectomy.  No evidence for new lumps or bumps.  No axillary lymphadenopathy.     No recent imaging studies     Assessment and plan: Angelica Gomez returns for breast care follow-up.  She is 1 year out from bilateral mastectomy for invasive ductal carcinoma.  Underwent immediate reconstruction and required adjuvant chemotherapy.  She has no new complaints or evidence of recurrence on anastrozole and Zoladex.  I would like to see her again in 1 year.  Thank you for the opportunity to help in her care.    Again, thank you for allowing me to participate in the care of your patient.      Sincerely,      Aba Phillips MD    "
Principal Discharge DX:	Acute cholecystitis

## 2025-04-02 PROBLEM — Z34.03 ENCOUNTER FOR PRENATAL CARE IN THIRD TRIMESTER OF FIRST PREGNANCY: Status: RESOLVED | Noted: 2017-05-17 | Resolved: 2025-04-02

## 2025-06-24 ENCOUNTER — NON-APPOINTMENT (OUTPATIENT)
Age: 40
End: 2025-06-24

## 2025-06-24 ENCOUNTER — APPOINTMENT (OUTPATIENT)
Dept: OBGYN | Facility: CLINIC | Age: 40
End: 2025-06-24
Payer: COMMERCIAL

## 2025-06-24 VITALS
HEIGHT: 62 IN | WEIGHT: 198 LBS | BODY MASS INDEX: 36.44 KG/M2 | SYSTOLIC BLOOD PRESSURE: 139 MMHG | DIASTOLIC BLOOD PRESSURE: 81 MMHG

## 2025-06-24 PROCEDURE — 99459 PELVIC EXAMINATION: CPT | Mod: NC

## 2025-06-24 PROCEDURE — 99385 PREV VISIT NEW AGE 18-39: CPT | Mod: 25

## 2025-06-26 LAB
CYTOLOGY CVX/VAG DOC THIN PREP: NORMAL
HPV HIGH+LOW RISK DNA PNL CVX: NOT DETECTED